# Patient Record
Sex: FEMALE | Race: WHITE | NOT HISPANIC OR LATINO | Employment: OTHER | URBAN - NONMETROPOLITAN AREA
[De-identification: names, ages, dates, MRNs, and addresses within clinical notes are randomized per-mention and may not be internally consistent; named-entity substitution may affect disease eponyms.]

---

## 2021-04-08 ENCOUNTER — APPOINTMENT (EMERGENCY)
Dept: CT IMAGING | Facility: HOSPITAL | Age: 76
End: 2021-04-08
Payer: COMMERCIAL

## 2021-04-08 ENCOUNTER — HOSPITAL ENCOUNTER (EMERGENCY)
Facility: HOSPITAL | Age: 76
Discharge: HOME | End: 2021-04-08
Attending: EMERGENCY MEDICINE | Admitting: EMERGENCY MEDICINE
Payer: COMMERCIAL

## 2021-04-08 VITALS
OXYGEN SATURATION: 97 % | HEIGHT: 65 IN | DIASTOLIC BLOOD PRESSURE: 77 MMHG | RESPIRATION RATE: 21 BRPM | HEART RATE: 90 BPM | TEMPERATURE: 97.9 F | BODY MASS INDEX: 33.39 KG/M2 | WEIGHT: 200.4 LBS | SYSTOLIC BLOOD PRESSURE: 162 MMHG

## 2021-04-08 DIAGNOSIS — R55 NEAR SYNCOPE: Primary | ICD-10-CM

## 2021-04-08 LAB
ALBUMIN SERPL BCP-MCNC: 3.8 G/DL (ref 3.5–5)
ALP SERPL-CCNC: 83 U/L (ref 46–116)
ALT SERPL W P-5'-P-CCNC: 25 U/L (ref 12–78)
ANION GAP SERPL CALCULATED.3IONS-SCNC: 8 MMOL/L (ref 4–13)
APTT PPP: 29 SECONDS (ref 23–37)
AST SERPL W P-5'-P-CCNC: 20 U/L (ref 5–45)
BACTERIA UR QL AUTO: NORMAL /HPF
BASOPHILS # BLD AUTO: 0.09 THOUSANDS/ΜL (ref 0–0.1)
BASOPHILS NFR BLD AUTO: 1 % (ref 0–1)
BILIRUB SERPL-MCNC: 0.31 MG/DL (ref 0.2–1)
BILIRUB UR QL STRIP: NEGATIVE
BUN SERPL-MCNC: 21 MG/DL (ref 5–25)
CALCIUM SERPL-MCNC: 9.6 MG/DL (ref 8.3–10.1)
CHLORIDE SERPL-SCNC: 105 MMOL/L (ref 100–108)
CLARITY UR: CLEAR
CO2 SERPL-SCNC: 29 MMOL/L (ref 21–32)
COLOR UR: YELLOW
CREAT SERPL-MCNC: 0.69 MG/DL (ref 0.6–1.3)
D DIMER PPP FEU-MCNC: 1.04 UG/ML FEU
EOSINOPHIL # BLD AUTO: 0.32 THOUSAND/ΜL (ref 0–0.61)
EOSINOPHIL NFR BLD AUTO: 4 % (ref 0–6)
ERYTHROCYTE [DISTWIDTH] IN BLOOD BY AUTOMATED COUNT: 12.8 % (ref 11.6–15.1)
GFR SERPL CREATININE-BSD FRML MDRD: 85 ML/MIN/1.73SQ M
GLUCOSE SERPL-MCNC: 122 MG/DL (ref 65–140)
GLUCOSE UR STRIP-MCNC: NEGATIVE MG/DL
HCT VFR BLD AUTO: 41.7 % (ref 34.8–46.1)
HGB BLD-MCNC: 14 G/DL (ref 11.5–15.4)
HGB UR QL STRIP.AUTO: ABNORMAL
IMM GRANULOCYTES # BLD AUTO: 0.02 THOUSAND/UL (ref 0–0.2)
IMM GRANULOCYTES NFR BLD AUTO: 0 % (ref 0–2)
INR PPP: 0.97 (ref 0.84–1.19)
KETONES UR STRIP-MCNC: NEGATIVE MG/DL
LACTATE SERPL-SCNC: 1.1 MMOL/L (ref 0.5–2)
LEUKOCYTE ESTERASE UR QL STRIP: NEGATIVE
LIPASE SERPL-CCNC: 95 U/L (ref 73–393)
LYMPHOCYTES # BLD AUTO: 2.45 THOUSANDS/ΜL (ref 0.6–4.47)
LYMPHOCYTES NFR BLD AUTO: 29 % (ref 14–44)
MCH RBC QN AUTO: 32.6 PG (ref 26.8–34.3)
MCHC RBC AUTO-ENTMCNC: 33.6 G/DL (ref 31.4–37.4)
MCV RBC AUTO: 97 FL (ref 82–98)
MONOCYTES # BLD AUTO: 1.04 THOUSAND/ΜL (ref 0.17–1.22)
MONOCYTES NFR BLD AUTO: 12 % (ref 4–12)
NEUTROPHILS # BLD AUTO: 4.65 THOUSANDS/ΜL (ref 1.85–7.62)
NEUTS SEG NFR BLD AUTO: 54 % (ref 43–75)
NITRITE UR QL STRIP: NEGATIVE
NON-SQ EPI CELLS URNS QL MICRO: NORMAL /HPF
NRBC BLD AUTO-RTO: 0 /100 WBCS
PH UR STRIP.AUTO: 6.5 [PH]
PLATELET # BLD AUTO: 291 THOUSANDS/UL (ref 149–390)
PMV BLD AUTO: 9.8 FL (ref 8.9–12.7)
POTASSIUM SERPL-SCNC: 3.6 MMOL/L (ref 3.5–5.3)
PROT SERPL-MCNC: 7.8 G/DL (ref 6.4–8.2)
PROT UR STRIP-MCNC: NEGATIVE MG/DL
PROTHROMBIN TIME: 12.7 SECONDS (ref 11.6–14.5)
RBC # BLD AUTO: 4.29 MILLION/UL (ref 3.81–5.12)
RBC #/AREA URNS AUTO: NORMAL /HPF
SODIUM SERPL-SCNC: 142 MMOL/L (ref 136–145)
SP GR UR STRIP.AUTO: <=1.005 (ref 1–1.03)
TROPONIN I SERPL-MCNC: <0.02 NG/ML
UROBILINOGEN UR QL STRIP.AUTO: 0.2 E.U./DL
WBC # BLD AUTO: 8.57 THOUSAND/UL (ref 4.31–10.16)
WBC #/AREA URNS AUTO: NORMAL /HPF

## 2021-04-08 PROCEDURE — 83605 ASSAY OF LACTIC ACID: CPT | Performed by: EMERGENCY MEDICINE

## 2021-04-08 PROCEDURE — 99285 EMERGENCY DEPT VISIT HI MDM: CPT

## 2021-04-08 PROCEDURE — 85379 FIBRIN DEGRADATION QUANT: CPT | Performed by: EMERGENCY MEDICINE

## 2021-04-08 PROCEDURE — 71275 CT ANGIOGRAPHY CHEST: CPT

## 2021-04-08 PROCEDURE — 80053 COMPREHEN METABOLIC PANEL: CPT | Performed by: EMERGENCY MEDICINE

## 2021-04-08 PROCEDURE — 85730 THROMBOPLASTIN TIME PARTIAL: CPT | Performed by: EMERGENCY MEDICINE

## 2021-04-08 PROCEDURE — 85610 PROTHROMBIN TIME: CPT | Performed by: EMERGENCY MEDICINE

## 2021-04-08 PROCEDURE — 74174 CTA ABD&PLVS W/CONTRAST: CPT

## 2021-04-08 PROCEDURE — 96360 HYDRATION IV INFUSION INIT: CPT

## 2021-04-08 PROCEDURE — 36415 COLL VENOUS BLD VENIPUNCTURE: CPT | Performed by: EMERGENCY MEDICINE

## 2021-04-08 PROCEDURE — G1004 CDSM NDSC: HCPCS

## 2021-04-08 PROCEDURE — 84484 ASSAY OF TROPONIN QUANT: CPT | Performed by: EMERGENCY MEDICINE

## 2021-04-08 PROCEDURE — 83690 ASSAY OF LIPASE: CPT | Performed by: EMERGENCY MEDICINE

## 2021-04-08 PROCEDURE — 99285 EMERGENCY DEPT VISIT HI MDM: CPT | Performed by: EMERGENCY MEDICINE

## 2021-04-08 PROCEDURE — 93005 ELECTROCARDIOGRAM TRACING: CPT

## 2021-04-08 PROCEDURE — 85025 COMPLETE CBC W/AUTO DIFF WBC: CPT | Performed by: EMERGENCY MEDICINE

## 2021-04-08 PROCEDURE — 81001 URINALYSIS AUTO W/SCOPE: CPT | Performed by: EMERGENCY MEDICINE

## 2021-04-08 RX ORDER — ROSUVASTATIN CALCIUM 5 MG/1
5 TABLET, COATED ORAL
COMMUNITY

## 2021-04-08 RX ORDER — LOSARTAN POTASSIUM 25 MG/1
25 TABLET ORAL DAILY
COMMUNITY

## 2021-04-08 RX ADMIN — IOHEXOL 100 ML: 350 INJECTION, SOLUTION INTRAVENOUS at 17:25

## 2021-04-08 RX ADMIN — SODIUM CHLORIDE 1000 ML: 0.9 INJECTION, SOLUTION INTRAVENOUS at 17:25

## 2021-04-08 NOTE — ED PROVIDER NOTES
History  Chief Complaint   Patient presents with    Dizziness     pt woke up at 0500 to go to the bathroom  states she started feeling lightheaded and dizzy and felt her heart racing with " a warm feeling going up her back"; episode lasted ten minutes     Patient states that 5:00 a m  This morning she was going to the bathroom when she suddenly got lightheaded  Had a warm sensation going upper back  Felt her heart beating fast   Symptoms resolved after 10 minutes  Patient had another episode today while cleaning again the symptoms resolved  Again had a warm sensation going upper back  Patient had 1 other episode afterward again while cleaning  Same symptoms  Resolved after 10 minutes  No shortness of breath  No spinning sensation  No change in speech or vision  No focal weakness or numbness  States she has had similar symptoms in the past with her SVT however the warm sensation upper back was new  History provided by:  Patient   used: No    Dizziness  Quality:  Lightheadedness  Severity:  Mild  Onset quality:  Sudden  Duration: 10 minutes  Timing:  Intermittent  Progression:  Resolved  Chronicity:  New  Context: standing up and urinating    Context: not with bowel movement and not with inactivity    Relieved by:  Nothing  Worsened by:  Nothing  Ineffective treatments:  None tried  Associated symptoms: no blood in stool, no chest pain, no diarrhea, no headaches, no hearing loss, no nausea, no palpitations, no shortness of breath, no vision changes and no vomiting        None       Past Medical History:   Diagnosis Date    [facility] [address] [facility]     Hypertension     [facility]        Past Surgical History:   Procedure Laterality Date    BREAST SURGERY       SECTION      NOSE SURGERY      TONSILLECTOMY         History reviewed  No pertinent family history  I have reviewed and agree with the history as documented      E-Cigarette/Vaping  E-Cigarette Use Never User      E-Cigarette/Vaping Substances     Social History     Tobacco Use    Smoking status: Former Smoker    Smokeless tobacco: Never Used   Substance Use Topics    Alcohol use: Not Currently    Drug use: Not Currently       Review of Systems   Constitutional: Negative for chills and fever  HENT: Negative for ear pain, hearing loss, sore throat, trouble swallowing and voice change  Eyes: Negative for pain and discharge  Respiratory: Negative for cough, shortness of breath and wheezing  Cardiovascular: Negative for chest pain and palpitations  Gastrointestinal: Negative for abdominal pain, blood in stool, constipation, diarrhea, nausea and vomiting  Genitourinary: Negative for dysuria, flank pain, frequency and hematuria  Musculoskeletal: Negative for joint swelling, neck pain and neck stiffness  Skin: Negative for rash and wound  Neurological: Positive for dizziness  Negative for seizures, syncope, facial asymmetry and headaches  Psychiatric/Behavioral: Negative for hallucinations, self-injury and suicidal ideas  All other systems reviewed and are negative  Physical Exam  Physical Exam  Vitals signs and nursing note reviewed  Constitutional:       General: She is not in acute distress  Appearance: She is well-developed  HENT:      Head: Normocephalic and atraumatic  Right Ear: External ear normal       Left Ear: External ear normal    Eyes:      Conjunctiva/sclera: Conjunctivae normal       Pupils: Pupils are equal, round, and reactive to light  Neck:      Musculoskeletal: Normal range of motion and neck supple  Cardiovascular:      Rate and Rhythm: Normal rate and regular rhythm  Heart sounds: Normal heart sounds  No murmur  Pulmonary:      Effort: Pulmonary effort is normal       Breath sounds: Normal breath sounds  No wheezing or rales  Abdominal:      General: Bowel sounds are normal  There is no distension        Palpations: Abdomen is soft  Tenderness: There is no abdominal tenderness  There is no guarding or rebound  Musculoskeletal: Normal range of motion  General: No deformity  Skin:     General: Skin is warm and dry  Findings: No rash  Neurological:      General: No focal deficit present  Mental Status: She is alert and oriented to person, place, and time  Cranial Nerves: No cranial nerve deficit  Psychiatric:         Behavior: Behavior normal          Vital Signs  ED Triage Vitals   Temperature Pulse Respirations Blood Pressure SpO2   04/08/21 1635 04/08/21 1630 04/08/21 1635 04/08/21 1630 04/08/21 1630   97 9 °F (36 6 °C) 104 18 (!) 192/85 96 %      Temp Source Heart Rate Source Patient Position - Orthostatic VS BP Location FiO2 (%)   04/08/21 1635 04/08/21 1635 04/08/21 1635 04/08/21 1635 --   Temporal Monitor Sitting Right arm       Pain Score       --                  Vitals:    04/08/21 1630 04/08/21 1635 04/08/21 1645 04/08/21 1730   BP: (!) 192/85 (!) 192/85 153/74 143/65   Pulse: 104 98 99 91   Patient Position - Orthostatic VS:  Sitting Sitting Lying         Visual Acuity      ED Medications  Medications   sodium chloride 0 9 % bolus 1,000 mL (1,000 mL Intravenous New Bag 4/8/21 1725)   iohexol (OMNIPAQUE) 350 MG/ML injection (SINGLE-DOSE) 100 mL (100 mL Intravenous Given 4/8/21 1725)       Diagnostic Studies  Results Reviewed     Procedure Component Value Units Date/Time    Lactic acid [[id number]]  (Normal) Collected: 04/08/21 1639    Lab Status: Final result Specimen: Blood from Arm, Left Updated: 04/08/21 1710     LACTIC ACID 1 1 mmol/L     Narrative:      Result may be elevated if tourniquet was used during collection      Urine Microscopic [[id number]]  (Normal) Collected: 04/08/21 1657    Lab Status: Final result Specimen: Urine, Clean Catch Updated: 04/08/21 1710     RBC, UA 1-2 /hpf      WBC, UA 1-2 /hpf      Epithelial Cells None Seen /hpf      Bacteria, UA None Seen /hpf Troponin I [[id number]]  (Normal) Collected: 04/08/21 1639    Lab Status: Final result Specimen: Blood from Arm, Left Updated: 04/08/21 1708     Troponin I <0 02 ng/mL     D-Dimer [[id number]]  (Abnormal) Collected: 04/08/21 1639    Lab Status: Final result Specimen: Blood from Arm, Left Updated: 04/08/21 1703     D-Dimer, Quant 1 04 ug/ml FEU     Comprehensive metabolic panel [[id number]] Collected: 04/08/21 1639    Lab Status: Final result Specimen: Blood from Arm, Left Updated: 04/08/21 1703     Sodium 142 mmol/L      Potassium 3 6 mmol/L      Chloride 105 mmol/L      CO2 29 mmol/L      ANION GAP 8 mmol/L      BUN 21 mg/dL      Creatinine 0 69 mg/dL      Glucose 122 mg/dL      Calcium 9 6 mg/dL      AST 20 U/L      ALT 25 U/L      Alkaline Phosphatase 83 U/L      Total Protein 7 8 g/dL      Albumin 3 8 g/dL      Total Bilirubin 0 31 mg/dL      eGFR 85 ml/min/1 73sq m     Narrative:      [facility] guidelines for Chronic Kidney Disease (CKD):     Stage 1 with normal or high GFR (GFR > 90 mL/min/1 73 square meters)    Stage 2 Mild CKD (GFR = 60-89 mL/min/1 73 square meters)    Stage 3A Moderate CKD (GFR = 45-59 mL/min/1 73 square meters)    Stage 3B Moderate CKD (GFR = 30-44 mL/min/1 73 square meters)    Stage 4 Severe CKD (GFR = 15-29 mL/min/1 73 square meters)    Stage 5 End Stage CKD (GFR <15 mL/min/1 73 square meters)  Note: GFR calculation is accurate only with a steady state creatinine    Lipase [[id number]]  (Normal) Collected: 04/08/21 1639    Lab Status: Final result Specimen: Blood from Arm, Left Updated: 04/08/21 1703     Lipase 95 u/L     UA w Reflex to Microscopic w Reflex to Culture [[id number]]  (Abnormal) Collected: 04/08/21 1657    Lab Status: Final result Specimen: Urine, Clean Catch Updated: 04/08/21 1703     Color, UA Yellow     Clarity, UA Clear     Specific Gravity, UA <=1 005     pH, UA 6 5     Leukocytes, UA Negative     Nitrite, UA Negative     Protein, UA Negative mg/dl      Glucose, UA Negative mg/dl      Ketones, UA Negative mg/dl      Urobilinogen, UA 0 2 E U /dl      Bilirubin, UA Negative     Blood, UA Trace-Intact    Protime-INR [[id number]]  (Normal) Collected: 04/08/21 1639    Lab Status: Final result Specimen: Blood from Arm, Left Updated: 04/08/21 1700     Protime 12 7 seconds      INR 0 97    APTT [[id number]]  (Normal) Collected: 04/08/21 1639    Lab Status: Final result Specimen: Blood from Arm, Left Updated: 04/08/21 1700     PTT 29 seconds     CBC and differential [[id number]] Collected: 04/08/21 1640    Lab Status: Final result Specimen: Blood from Arm, Left Updated: 04/08/21 1647     WBC 8 57 Thousand/uL      RBC 4 29 Million/uL      Hemoglobin 14 0 g/dL      Hematocrit 41 7 %      MCV 97 fL      MCH 32 6 pg      MCHC 33 6 g/dL      RDW 12 8 %      MPV 9 8 fL      Platelets 407 Thousands/uL      nRBC 0 /100 WBCs      Neutrophils Relative 54 %      Immat GRANS % 0 %      Lymphocytes Relative 29 %      Monocytes Relative 12 %      Eosinophils Relative 4 %      Basophils Relative 1 %      Neutrophils Absolute 4 65 Thousands/µL      Immature Grans Absolute 0 02 Thousand/uL      Lymphocytes Absolute 2 45 Thousands/µL      Monocytes Absolute 1 04 Thousand/µL      Eosinophils Absolute 0 32 Thousand/µL      Basophils Absolute 0 09 Thousands/µL                  CTA dissection protocol chest abdomen pelvis w wo contrast   Final Result by Pawan Alvarado MD (04/08 1754)      No aneurysm or dissection  Diverticulosis with mild inflammatory changes adjacent to the sigmoid colon, correlate for mild diverticulitis  T7 compression favored to be chronic  The study was marked in [facility] for immediate notification           Workstation performed: [id number]                    Procedures  ECG 12 Lead Documentation Only    Date/Time: 4/8/2021 4:38 PM  Performed by: Marty Do MD  Authorized by: Marty Do MD     ECG reviewed by me, the ED Provider: yes Patient location:  ED  Previous ECG:     Previous ECG:  Unavailable  Rate:     ECG rate:  100  Rhythm:     Rhythm: sinus rhythm    Ectopy:     Ectopy: PVCs    QRS:     QRS axis:  Normal             ED Course                                           MDM  Number of Diagnoses or Management Options  Near syncope:   Diagnosis management comments: Discussed with patient and daughter about her lab and CT scan results  CT scan was questionable about diverticulitis  Patient has no abdominal pain  No abdominal tenderness  Has had normal bowel movements  Is afebrile with a normal white blood cell count  Clinically the patient does not have diverticulitis  The patient is to follow up with her family doctor  She is to return if she develops any left-sided abdominal pain or any kind of abdominal pain  Will need antibiotics sent  Patient agrees to plan of care  Amount and/or Complexity of Data Reviewed  Clinical lab tests: reviewed  Review and summarize past medical records: yes        Disposition  Final diagnoses:   Near syncope     Time reflects when diagnosis was documented in both MDM as applicable and the Disposition within this note     Time User Action Codes Description Comment    4/8/2021  4:54 PM Angelic Thomas [[id number]] Near syncope       ED Disposition     ED Disposition Condition Date/Time Comment    Discharge Stable u Apr 8, 2021  5:28 PM Racheal Corcoran discharge to home/self care  Follow-up Information     Follow up With Specialties Details Why Contact Info    Juma Soto MD Family Medicine Call in 1 day  [address] [address]  [phone]            Patient's Medications    No medications on file     No discharge procedures on file      PDMP Review     None          ED Provider  Electronically Signed by           Robyn Brewer MD  04/08/21 1800

## 2021-04-09 LAB
ATRIAL RATE: 101 BPM
P AXIS: 63 DEGREES
PR INTERVAL: 172 MS
QRS AXIS: 10 DEGREES
QRSD INTERVAL: 84 MS
QT INTERVAL: 348 MS
QTC INTERVAL: 451 MS
T WAVE AXIS: 47 DEGREES
VENTRICULAR RATE: 101 BPM

## 2021-04-09 PROCEDURE — 93010 ELECTROCARDIOGRAM REPORT: CPT | Performed by: INTERNAL MEDICINE

## 2022-11-29 ENCOUNTER — TELEPHONE (OUTPATIENT)
Dept: CARDIOLOGY CLINIC | Facility: CLINIC | Age: 77
End: 2022-11-29

## 2022-11-29 ENCOUNTER — TRANSCRIBE ORDERS (OUTPATIENT)
Dept: CARDIOLOGY CLINIC | Facility: CLINIC | Age: 77
End: 2022-11-29

## 2022-11-29 DIAGNOSIS — R31.9 HEMATURIA, UNSPECIFIED TYPE: Primary | ICD-10-CM

## 2022-11-29 NOTE — TELEPHONE ENCOUNTER
New Patient    What is the reason for the patient’s appointment? Patient is being referred by PCP for microscopic hematuria    What office location does the patient prefer? [facility] or [place]    Imaging/Lab Results: UA in care everywhere     Do we accept the patient's insurance or is the patient Self-Pay? Insurance Provider: Yes, [employer]  Plan Type/Number:  Member ID#: Has the patient had any previous Urologist(s)? Over 11 years ago, urologist is      Have patient records been requested? If not are records showing in Epic:     Has the patient had any outside testing done? UA, CMP     Does the patient have a personal history of cancer? No    Patient scheduled 23 at 9 am with Vicinohilary Antoine in [place]

## 2023-01-05 ENCOUNTER — OFFICE VISIT (OUTPATIENT)
Dept: UROLOGY | Facility: CLINIC | Age: 78
End: 2023-01-05

## 2023-01-05 VITALS — BODY MASS INDEX: 31.62 KG/M2 | WEIGHT: 190 LBS

## 2023-01-05 DIAGNOSIS — R31.9 HEMATURIA, UNSPECIFIED TYPE: Primary | ICD-10-CM

## 2023-01-05 LAB
BACTERIA UR QL AUTO: ABNORMAL /HPF
BILIRUB UR QL STRIP: NEGATIVE
CLARITY UR: CLEAR
COLOR UR: ABNORMAL
GLUCOSE UR STRIP-MCNC: NEGATIVE MG/DL
HGB UR QL STRIP.AUTO: NEGATIVE
KETONES UR STRIP-MCNC: NEGATIVE MG/DL
LEUKOCYTE ESTERASE UR QL STRIP: NEGATIVE
MUCOUS THREADS UR QL AUTO: ABNORMAL
NITRITE UR QL STRIP: NEGATIVE
NON-SQ EPI CELLS URNS QL MICRO: ABNORMAL /HPF
PH UR STRIP.AUTO: 5.5 [PH]
PROT UR STRIP-MCNC: NEGATIVE MG/DL
RBC #/AREA URNS AUTO: ABNORMAL /HPF
SP GR UR STRIP.AUTO: 1.02 (ref 1–1.03)
UROBILINOGEN UR STRIP-ACNC: <2 MG/DL
WBC #/AREA URNS AUTO: ABNORMAL /HPF

## 2023-01-05 NOTE — PROGRESS NOTES
1/5/2023    No chief complaint on file  Assessment and Plan    68 y o  female new patient to office    1  Microscopic hematuria  · Reports prior history of and underwent prior cystoscopy over 10 years ago by Dr Dileep Dubois  · Urine dip today is positive for trace blood  · Send for UA/micro  · She is a former smoker as well as second hand smoke exposure  · Denies family history of bladder, ureteral, or renal cancer  · Pending results of microscopic urinalysis we will consider possible need for CT Renal Protocol and cystoscopy  I would only recommend this if urine micro is positive for true microscopic hematuria  CT imaging from 4/2021 did show on obstructing punctate left renal calculi  History of Present Illness  Shandra Nielsen is a 68 y o  female new patient to office  Here for evaluation of microscopic hematuria  She reports a history of microscopic hematuria in the past and was previously seen by Dr Dileep Dubois over 10 years ago and under cystoscopy around that time which she reports was normal  She presents today for evaluation after routine lab and urine testing was completed by her PCP Dr Angie Sutton  She is a former smoker having quit in [facility] but was a  and was around second hand smoke for many years  Denies any family history of bladder, ureteral, or renal cancer  Review of prior CT imaging from 4/08/2021 showed a punctate non obstructing left renal calcification  She denies any known history of kidney stones  Negative chemical exposure history  Review of Systems   Constitutional: Negative for chills and fever  HENT: Negative for ear pain and sore throat  Eyes: Negative for pain and visual disturbance  Respiratory: Negative for cough and shortness of breath  Cardiovascular: Negative for chest pain and palpitations  Gastrointestinal: Negative for abdominal pain, constipation, diarrhea, nausea and vomiting     Genitourinary: Negative for difficulty urinating, dysuria, flank pain, frequency, hematuria, pelvic pain and urgency  Musculoskeletal: Negative for arthralgias and back pain  Skin: Negative for color change and rash  Neurological: Positive for numbness  Negative for dizziness, syncope and weakness  All other systems reviewed and are negative  Vitals  Vitals:    01/05/23 0849   Weight: 86 2 kg (190 lb)       Physical Exam  Vitals reviewed  Constitutional:       General: She is not in acute distress  Appearance: Normal appearance  She is normal weight  She is not ill-appearing or toxic-appearing  HENT:      Head: Normocephalic and atraumatic  Nose: Nose normal    Eyes:      General: No scleral icterus  Conjunctiva/sclera: Conjunctivae normal    Cardiovascular:      Rate and Rhythm: Normal rate  Pulses: Normal pulses  Pulmonary:      Effort: Pulmonary effort is normal  No respiratory distress  Abdominal:      General: Abdomen is flat  Palpations: Abdomen is soft  Tenderness: There is no abdominal tenderness  There is no right CVA tenderness or left CVA tenderness  Hernia: No hernia is present  Musculoskeletal:         General: Normal range of motion  Cervical back: Normal range of motion  Skin:     General: Skin is warm and dry  Neurological:      General: No focal deficit present  Mental Status: She is alert and oriented to person, place, and time  Mental status is at baseline  Psychiatric:         Mood and Affect: Mood normal          Behavior: Behavior normal          Thought Content: Thought content normal          Judgment: Judgment normal          Past History  Past Medical History:   Diagnosis Date   • Diabetes mellitus ([facility] [address] )    • Hypertension    • SV[facility]ra[facility] tachycardia) ([facility])      Social History     Socioeconomic History   • Marital status:       Spouse name: None   • Number of children: None   • Years of education: None   • Highest education level: None   Occupational History   • None   Tobacco Use   • Smoking status: Former   • Smokeless tobacco: Never   Vaping Use   • Vaping Use: Never used   Substance and Sexual Activity   • Alcohol use: Not Currently   • Drug use: Not Currently   • Sexual activity: None   Other Topics Concern   • None   Social History Narrative   • None     Social Determinants of Health     Financial Resource Strain: Not on file   Food Insecurity: Not on file   Transportation Needs: Not on file   Physical Activity: Not on file   Stress: Not on file   Social Connections: Not on file   Intimate Partner Violence: Not on file   Housing Stability: Not on file     Social History     Tobacco Use   Smoking Status Former   Smokeless Tobacco Never     History reviewed  No pertinent family history  The following portions of the patient's history were reviewed and updated as appropriate allergies, current medications, past medical history, past social history, past surgical history and problem list    [email or web address]  CT ANGIOGRAM OF THE CHEST, ABDOMEN AND PELVIS WITH AND WITHOUT IV CONTRAST     INDICATION:  pt woke up at 0500 to go to the bathroom  States she started feeling lightheaded and dizzy and felt her heart racing with " a warm feeling going up her back"; episode lasted ten minutes     COMPARISON: None      TECHNIQUE:  CT angiogram examination of the chest, abdomen and pelvis was performed according to standard protocol  This examination, like all CT scans performed in the [facility], was performed utilizing techniques to minimize   radiation dose exposure, including the use of iterative reconstruction and automated exposure control  Contrast as well as noncontrast images were obtained  3D reconstructions were performed an independent workstation, and are supplied for review      Rad dose 1777 mGy-cm      IV Contrast:  100 mL of iohexol (OMNIPAQUE)  Enteric Contrast:     FINDINGS:     VASCULAR STRUCTURES:  Mild atherosclerotic calcifications  No aortic aneurysm or dissection  Brachiocephalic, subclavian and proximal common carotid arteries are widely patent  The celiac, superior mesenteric and inferior mesenteric arteries are widely   patent  Single left, main right and 2 small accessory right renal arteries are patent  No significant stenosis of the iliac or proximal femoral arteries      No acute pulmonary embolism      OTHER FINDINGS:      CHEST:      HEART:  Coronary artery calcifications  Normal cardiac size  No pericardial effusion      LUNGS: No suspicious lung nodule, mass or consolidation  Mild emphysematous changes  Mild basilar subsegmental atelectasis or scarring  Small mucous impacted bronchi in the lower lungs  Central bronchi are patent      PLEURA: No pleural effusion      MEDIASTINUM AND CHRISTINE:  No mass or significant lymphadenopathy      CHEST WALL AND LOWER NECK:  4 x 3 2 cm nodule in the right lobe of the thyroid gland  Incidental discovery of one or more thyroid nodule(s) measuring more than 1 5 cm and without suspicious features is noted in this patient who is above 28years old;   according to guidelines published in the February 2015 white paper on incidental thyroid nodules in the Journal of the [employer] of Radiology Sheri Sanchez), further characterization with thyroid ultrasound is recommended        ABDOMEN     LIVER/BILIARY TREE:  Coarse calcification at the dome of the liver  Subcentimeter hypodense lesion in the lateral segment of the liver is too small to characterize but statistically benign  No other focal liver lesion  No biliary dilatation      GALLBLADDER:  No calcified gallstones  No pericholecystic inflammatory change      SPLEEN:  Unremarkable  Normal size      PANCREAS:  Unremarkable      ADRENAL GLANDS:  Unremarkable      KIDNEYS/URETERS:  No solid renal mass  No hydronephrosis  Punctate nonobstructing left renal calcifications      PELVIS     REPRODUCTIVE ORGANS:  Unremarkable for patient's age  Tubal ligation clips are noted      URINARY BLADDER:  Unremarkable      ADDITIONAL ABDOMINAL AND PELVIC STRUCTURES:      STOMACH AND BOWEL:  There is diverticulosis  There are mild inflammatory changes adjacent to the sigmoid colon , correlate for mild diverticulitis  No obstruction      ABDOMINOPELVIC CAVITY:  No pathologically enlarged mesenteric or retroperitoneal lymph nodes  No ascites or free intraperitoneal air      ABDOMINAL WALL/INGUINAL REGIONS:  Small fat-containing umbilical hernia      OSSEOUS STRUCTURES:  T7 compression with moderate loss of height and no bony retropulsion  Chronicity is favored  Degenerative grade 1 anterolisthesis at L4-5      IMPRESSION:     No aneurysm or dissection  Diverticulosis with mild inflammatory changes adjacent to the sigmoid colon, correlate for mild diverticulitis  T7 compression favored to be chronic  Results  No results found for this or any previous visit (from the past 1 hour(s))  ]  No results found for: PSA  Lab Results   Component Value Date    CALCIUM 9 6 04/08/2021    K 3 6 04/08/2021    CO2 29 04/08/2021     04/08/2021    BUN 21 04/08/2021    CREATININE 0 69 04/08/2021     Lab Results   Component Value Date    WBC 8 57 04/08/2021    HGB 14 0 04/08/2021    HCT 41 7 04/08/2021    MCV 97 04/08/2021     04/08/2021       Please Note:  Voice dictation software has been used to create this document  There may be inadvertent transcriptions errors       Henri Swann

## 2023-01-06 ENCOUNTER — TELEPHONE (OUTPATIENT)
Dept: UROLOGY | Facility: CLINIC | Age: 78
End: 2023-01-06

## 2023-01-06 DIAGNOSIS — R31.9 HEMATURIA, UNSPECIFIED TYPE: Primary | ICD-10-CM

## 2023-01-06 NOTE — TELEPHONE ENCOUNTER
----- Message from Nicole U  79  sent at 1/6/2023  7:37 AM EST -----  Please let patient know that her urine microscopic was positive for true microscopic hematuria  I am recommending a CT Renal Protocol scan as well as a cystoscopy for further evaluation  This was discussed with her yesterday during her office visit

## 2023-01-06 NOTE — PROGRESS NOTES
Error was made when reviewing results of patients urine microscopic analysis from yesterday  Patient was seen yesterday for evaluation of microscopic hematuria yesterday  Upon review of urine results this morning I had documented that patients urine micro was positive for true microscopic hematuria with 2-4 RBC  This however this is an error as upon further review patients urine microscopic shows 1-2 RBC per hpf and 2-4 WBC per hpf  Base on these results, patient does not meet criteria for true microscopic hematuria and CT imaging and cystoscopy are no indicated at this time

## 2023-01-06 NOTE — RESULT ENCOUNTER NOTE
Please let patient know that her urine microscopic was positive for true microscopic hematuria  I am recommending a CT Renal Protocol scan as well as a cystoscopy for further evaluation  This was discussed with her yesterday during her office visit

## 2023-01-06 NOTE — TELEPHONE ENCOUNTER
Error noted upon initial review of patients recent urine microscopic analysis from yesterday  I had documented that patient was found to have true microscopic hematuria with RBC of 2-4 per hpf  However, this is incorrect  Patients urine microscopic shows 1-2 RBC and 2-4 WBC  Based on these results, there is no indication for CT renal protocol at this time

## 2023-01-08 ENCOUNTER — APPOINTMENT (EMERGENCY)
Dept: RADIOLOGY | Facility: HOSPITAL | Age: 78
End: 2023-01-08

## 2023-01-08 ENCOUNTER — HOSPITAL ENCOUNTER (EMERGENCY)
Facility: HOSPITAL | Age: 78
Discharge: HOME | End: 2023-01-08
Attending: EMERGENCY MEDICINE

## 2023-01-08 VITALS
TEMPERATURE: 98.4 F | WEIGHT: 199.74 LBS | BODY MASS INDEX: 33.24 KG/M2 | SYSTOLIC BLOOD PRESSURE: 115 MMHG | DIASTOLIC BLOOD PRESSURE: 54 MMHG | RESPIRATION RATE: 17 BRPM | OXYGEN SATURATION: 94 % | HEART RATE: 78 BPM

## 2023-01-08 DIAGNOSIS — I47.1 SVT (SUPRAVENTRICULAR TACHYCARDIA) (HCC): ICD-10-CM

## 2023-01-08 DIAGNOSIS — U07.1 COVID-19 VIRUS INFECTION: Primary | ICD-10-CM

## 2023-01-08 LAB
2HR DELTA HS TROPONIN: 3 NG/L
ALBUMIN SERPL BCP-MCNC: 3.9 G/DL (ref 3.5–5)
ALP SERPL-CCNC: 90 U/L (ref 46–116)
ALT SERPL W P-5'-P-CCNC: 18 U/L (ref 12–78)
ANION GAP SERPL CALCULATED.3IONS-SCNC: 7 MMOL/L (ref 4–13)
APTT PPP: 25 SECONDS (ref 23–37)
AST SERPL W P-5'-P-CCNC: 21 U/L (ref 5–45)
ATRIAL RATE: 117 BPM
BASOPHILS # BLD AUTO: 0.08 THOUSANDS/ÂΜL (ref 0–0.1)
BASOPHILS NFR BLD AUTO: 1 % (ref 0–1)
BILIRUB SERPL-MCNC: 0.25 MG/DL (ref 0.2–1)
BUN SERPL-MCNC: 20 MG/DL (ref 5–25)
CALCIUM SERPL-MCNC: 9.8 MG/DL (ref 8.3–10.1)
CARDIAC TROPONIN I PNL SERPL HS: 4 NG/L
CARDIAC TROPONIN I PNL SERPL HS: 7 NG/L
CHLORIDE SERPL-SCNC: 102 MMOL/L (ref 96–108)
CO2 SERPL-SCNC: 28 MMOL/L (ref 21–32)
CREAT SERPL-MCNC: 0.73 MG/DL (ref 0.6–1.3)
EOSINOPHIL # BLD AUTO: 0.33 THOUSAND/ÂΜL (ref 0–0.61)
EOSINOPHIL NFR BLD AUTO: 4 % (ref 0–6)
ERYTHROCYTE [DISTWIDTH] IN BLOOD BY AUTOMATED COUNT: 12.2 % (ref 11.6–15.1)
GFR SERPL CREATININE-BSD FRML MDRD: 79 ML/MIN/1.73SQ M
GLUCOSE SERPL-MCNC: 152 MG/DL (ref 65–140)
HCT VFR BLD AUTO: 46.5 % (ref 34.8–46.1)
HGB BLD-MCNC: 15.3 G/DL (ref 11.5–15.4)
IMM GRANULOCYTES # BLD AUTO: 0.02 THOUSAND/UL (ref 0–0.2)
IMM GRANULOCYTES NFR BLD AUTO: 0 % (ref 0–2)
INR PPP: 0.88 (ref 0.84–1.19)
LYMPHOCYTES # BLD AUTO: 2.31 THOUSANDS/ÂΜL (ref 0.6–4.47)
LYMPHOCYTES NFR BLD AUTO: 30 % (ref 14–44)
MAGNESIUM SERPL-MCNC: 2.1 MG/DL (ref 1.6–2.6)
MCH RBC QN AUTO: 32.4 PG (ref 26.8–34.3)
MCHC RBC AUTO-ENTMCNC: 32.9 G/DL (ref 31.4–37.4)
MCV RBC AUTO: 99 FL (ref 82–98)
MONOCYTES # BLD AUTO: 1.29 THOUSAND/ÂΜL (ref 0.17–1.22)
MONOCYTES NFR BLD AUTO: 17 % (ref 4–12)
NEUTROPHILS # BLD AUTO: 3.69 THOUSANDS/ÂΜL (ref 1.85–7.62)
NEUTS SEG NFR BLD AUTO: 48 % (ref 43–75)
NRBC BLD AUTO-RTO: 0 /100 WBCS
NT-PROBNP SERPL-MCNC: 170 PG/ML
P AXIS: 63 DEGREES
PLATELET # BLD AUTO: 317 THOUSANDS/UL (ref 149–390)
PMV BLD AUTO: 9.4 FL (ref 8.9–12.7)
POTASSIUM SERPL-SCNC: 3.9 MMOL/L (ref 3.5–5.3)
PR INTERVAL: 176 MS
PROT SERPL-MCNC: 7.8 G/DL (ref 6.4–8.4)
PROTHROMBIN TIME: 12 SECONDS (ref 11.6–14.5)
QRS AXIS: -22 DEGREES
QRS AXIS: 0 DEGREES
QRS AXIS: 17 DEGREES
QRSD INTERVAL: 82 MS
QRSD INTERVAL: 82 MS
QRSD INTERVAL: 84 MS
QT INTERVAL: 192 MS
QT INTERVAL: 282 MS
QT INTERVAL: 330 MS
QTC INTERVAL: 365 MS
QTC INTERVAL: 424 MS
QTC INTERVAL: 460 MS
RBC # BLD AUTO: 4.72 MILLION/UL (ref 3.81–5.12)
SODIUM SERPL-SCNC: 137 MMOL/L (ref 135–147)
T WAVE AXIS: -10 DEGREES
T WAVE AXIS: 233 DEGREES
T WAVE AXIS: 69 DEGREES
TSH SERPL DL<=0.05 MIU/L-ACNC: 1.51 UIU/ML (ref 0.45–4.5)
VENTRICULAR RATE: 117 BPM
VENTRICULAR RATE: 136 BPM
VENTRICULAR RATE: 217 BPM
WBC # BLD AUTO: 7.72 THOUSAND/UL (ref 4.31–10.16)

## 2023-01-08 RX ORDER — ADENOSINE 3 MG/ML
INJECTION, SOLUTION INTRAVENOUS
Status: COMPLETED
Start: 2023-01-08 | End: 2023-01-08

## 2023-01-08 RX ORDER — MAGNESIUM SULFATE 1 G/100ML
1 INJECTION INTRAVENOUS ONCE
Status: COMPLETED | OUTPATIENT
Start: 2023-01-08 | End: 2023-01-08

## 2023-01-08 RX ORDER — POTASSIUM CHLORIDE 20 MEQ/1
20 TABLET, EXTENDED RELEASE ORAL ONCE
Status: COMPLETED | OUTPATIENT
Start: 2023-01-08 | End: 2023-01-08

## 2023-01-08 RX ORDER — ADENOSINE 3 MG/ML
6 INJECTION INTRAVENOUS ONCE
Status: COMPLETED | OUTPATIENT
Start: 2023-01-08 | End: 2023-01-08

## 2023-01-08 RX ORDER — ACETAMINOPHEN 325 MG/1
975 TABLET ORAL ONCE
Status: COMPLETED | OUTPATIENT
Start: 2023-01-08 | End: 2023-01-08

## 2023-01-08 RX ADMIN — METOPROLOL TARTRATE 25 MG: 25 TABLET, FILM COATED ORAL at 02:06

## 2023-01-08 RX ADMIN — POTASSIUM CHLORIDE 20 MEQ: 1500 TABLET, EXTENDED RELEASE ORAL at 02:06

## 2023-01-08 RX ADMIN — ACETAMINOPHEN 975 MG: 325 TABLET ORAL at 00:24

## 2023-01-08 RX ADMIN — MAGNESIUM SULFATE HEPTAHYDRATE 1 G: 1 INJECTION, SOLUTION INTRAVENOUS at 02:06

## 2023-01-08 RX ADMIN — ADENOSINE 6 MG: 3 INJECTION INTRAVENOUS at 00:54

## 2023-01-08 NOTE — ED PROVIDER NOTES
History  Chief Complaint   Patient presents with   • Palpitations     Had an episode of palpitation with dizziness  Denies sob or cp  Palpitations gone at this time  Test positive for covid 2 days ago     Patient is a 15-year-old female with history of diabetes hypertension and SVT presents the emergency department due to palpitations and feeling lightheaded symptoms started this evening patient recently diagnosed COVID-positive 2 days ago  No cough or shortness of breath no chest pain  Symptoms now subsiding since evaluation by EMS patient reports she took her heart rate and blood pressure with a home blood pressure cuff and found her heart rate and blood pressure to be elevated  History provided by:  Patient and EMS personnel  Palpitations  Palpitations quality:  Fast  Onset quality:  Sudden  Duration:  1 hour  Timing:  Intermittent  Progression:  Resolved  Chronicity:  New  Associated symptoms: no chest pain, no cough, no dizziness, no nausea, no numbness, no shortness of breath, no vomiting and no weakness        Prior to Admission Medications   Prescriptions Last Dose Informant Patient Reported? Taking?    Cholecalciferol 125 MCG (5000 UT) capsule   Yes No   Sig: Take 1 tablet by mouth daily   Melatonin 5 MG CAPS   Yes No   Sig: Take by mouth   Multiple Vitamins-Minerals (CENTRUM SILVER PO)   Yes No   Sig: Take by mouth   losartan (COZAAR) 25 mg tablet   Yes No   Sig: Take 25 mg by mouth daily   metFORMIN (GLUCOPHAGE) 500 mg tablet   Yes No   Sig: Take 500 mg by mouth daily   metoprolol tartrate (LOPRESSOR) 25 mg tablet   Yes No   Sig: Take 25 mg by mouth 2 (two) times a day   rosuvastatin (CRESTOR) 5 mg tablet   Yes No   Sig: Take 5 mg by mouth      Facility-Administered Medications: None       Past Medical History:   Diagnosis Date   • Diabetes mellitus (HCC)    • Hypertension    • [facility]        Past Surgical History:   Procedure Laterality Date   • BREAST SURGERY •  SECTION     • NOSE SURGERY     • TONSILLECTOMY         History reviewed  No pertinent family history  I have reviewed and agree with the history as documented  E-Cigarette/Vaping   • E-Cigarette Use Never User      E-Cigarette/Vaping Substances     Social History     Tobacco Use   • Smoking status: Former   • Smokeless tobacco: Never   Vaping Use   • Vaping Use: Never used   Substance Use Topics   • Alcohol use: Not Currently   • Drug use: Not Currently       Review of Systems   Constitutional: Negative for activity change, appetite change, chills, fatigue and fever  HENT: Negative for congestion, ear pain, rhinorrhea and sore throat  Eyes: Negative for discharge, redness and visual disturbance  Respiratory: Negative for cough, chest tightness, shortness of breath and wheezing  Cardiovascular: Positive for palpitations  Negative for chest pain  Gastrointestinal: Negative for abdominal pain, constipation, diarrhea, nausea and vomiting  Endocrine: Negative for polydipsia and polyuria  Genitourinary: Negative for difficulty urinating, dysuria, frequency, hematuria and urgency  Musculoskeletal: Negative for arthralgias and myalgias  Skin: Negative for color change, pallor and rash  Neurological: Positive for light-headedness  Negative for dizziness, weakness, numbness and headaches  Hematological: Negative for adenopathy  Does not bruise/bleed easily  Psychiatric/Behavioral: The patient is nervous/anxious  All other systems reviewed and are negative  Physical Exam  Physical Exam  Vitals and nursing note reviewed  Constitutional:       Appearance: She is well-developed  HENT:      Head: Normocephalic and atraumatic  Right Ear: External ear normal       Left Ear: External ear normal       Nose: Nose normal    Eyes:      Conjunctiva/sclera: Conjunctivae normal       Pupils: Pupils are equal, round, and reactive to light     Cardiovascular:      Rate and Rhythm: Regular rhythm  Tachycardia present  Heart sounds: Normal heart sounds  Pulmonary:      Effort: Pulmonary effort is normal  No respiratory distress  Breath sounds: Normal breath sounds  No wheezing or rales  Chest:      Chest wall: No tenderness  Abdominal:      General: Bowel sounds are normal  There is no distension  Palpations: Abdomen is soft  Tenderness: There is no abdominal tenderness  There is no guarding  Musculoskeletal:         General: Normal range of motion  Cervical back: Normal range of motion and neck supple  Skin:     General: Skin is warm and dry  Neurological:      Mental Status: She is alert and oriented to person, place, and time  Cranial Nerves: No cranial nerve deficit  Sensory: No sensory deficit           Vital Signs  ED Triage Vitals [01/08/23 0020]   Temperature Pulse Respirations Blood Pressure SpO2   98 4 °F (36 9 °C) 103 18 132/67 95 %      Temp Source Heart Rate Source Patient Position - Orthostatic VS BP Location FiO2 (%)   Oral Monitor -- Right arm --      Pain Score       No Pain           Vitals:    01/08/23 0112 01/08/23 0130 01/08/23 0215 01/08/23 0246   BP: 149/82 147/70 128/66 108/64   Pulse: 97 92 (!) 107 92         Visual Acuity      ED Medications  Medications   acetaminophen (TYLENOL) tablet 975 mg (975 mg Oral Given 1/8/23 0024)   adenosine (ADENOCARD) injection 6 mg (6 mg Intravenous Given 1/8/23 0054)   metoprolol tartrate (LOPRESSOR) tablet 25 mg (25 mg Oral Given 1/8/23 0206)   potassium chloride (K-DUR,KLOR-CON) CR tablet 20 mEq (20 mEq Oral Given 1/8/23 0206)   magnesium sulfate IVPB (premix) SOLN 1 g (1 g Intravenous New Bag 1/8/23 0206)       Diagnostic Studies  Results Reviewed     Procedure Component Value Units Date/Time    HS Troponin I 2hr [[id number]]  (Normal) Collected: 01/08/23 0211    Lab Status: Final result Specimen: Blood from Arm, Right Updated: 01/08/23 0253     hs TnI 2hr 7 ng/L      Delta 2hr hsTnI 3 ng/L     TSH [[id number]]  (Normal) Collected: 01/08/23 0031    Lab Status: Final result Specimen: Blood from Arm, Right Updated: 01/08/23 0233     TSH 3RD GENERATON 1 508 uIU/mL     Narrative:      Patients undergoing fluorescein dye angiography may retain small amounts of fluorescein in the body for 48-72 hours post procedure  Samples containing fluorescein can produce falsely depressed TSH values  If the patient had this procedure,a specimen should be resubmitted post fluorescein clearance        HS Troponin I 4hr [[id number]]     Lab Status: No result Specimen: Blood     HS Troponin 0hr (reflex protocol) [[id number]]  (Normal) Collected: 01/08/23 0031    Lab Status: Final result Specimen: Blood from Arm, Right Updated: 01/08/23 0104     hs TnI 0hr 4 ng/L     NT-BNP PRO [[id number]]  (Normal) Collected: 01/08/23 0031    Lab Status: Final result Specimen: Blood from Arm, Right Updated: 01/08/23 0103     NT-proBNP 170 pg/mL     Comprehensive metabolic panel [[id number]]  (Abnormal) Collected: 01/08/23 0031    Lab Status: Final result Specimen: Blood from Arm, Right Updated: 01/08/23 0103     Sodium 137 mmol/L      Potassium 3 9 mmol/L      Chloride 102 mmol/L      CO2 28 mmol/L      ANION GAP 7 mmol/L      BUN 20 mg/dL      Creatinine 0 73 mg/dL      Glucose 152 mg/dL      Calcium 9 8 mg/dL      AST 21 U/L      ALT 18 U/L      Alkaline Phosphatase 90 U/L      Total Protein 7 8 g/dL      Albumin 3 9 g/dL      Total Bilirubin 0 25 mg/dL      eGFR 79 ml/min/1 73sq m     Narrative:      J Luis guidelines for Chronic Kidney Disease (CKD):   •  Stage 1 with normal or high GFR (GFR > 90 mL/min/1 73 square meters)  •  Stage 2 Mild CKD (GFR = 60-89 mL/min/1 73 square meters)  •  Stage 3A Moderate CKD (GFR = 45-59 mL/min/1 73 square meters)  •  Stage 3B Moderate CKD (GFR = 30-44 mL/min/1 73 square meters)  •  Stage 4 Severe CKD (GFR = 15-29 mL/min/1 73 square meters)  •  Stage 5 End Stage CKD (GFR <15 mL/min/1 73 square meters)  Note: GFR calculation is accurate only with a steady state creatinine    Magnesium [[id number]]  (Normal) Collected: 01/08/23 0031    Lab Status: Final result Specimen: Blood from Arm, Right Updated: 01/08/23 0103     Magnesium 2 1 mg/dL     Protime-INR [[id number]]  (Normal) Collected: 01/08/23 0031    Lab Status: Final result Specimen: Blood from Arm, Right Updated: 01/08/23 0052     Protime 12 0 seconds      INR 0 88    APTT [[id number]]  (Normal) Collected: 01/08/23 0031    Lab Status: Final result Specimen: Blood from Arm, Right Updated: 01/08/23 0052     PTT 25 seconds     CBC and differential [[id number]]  (Abnormal) Collected: 01/08/23 0031    Lab Status: Final result Specimen: Blood from Arm, Right Updated: 01/08/23 0040     WBC 7 72 Thousand/uL      RBC 4 72 Million/uL      Hemoglobin 15 3 g/dL      Hematocrit 46 5 %      MCV 99 fL      MCH 32 4 pg      MCHC 32 9 g/dL      RDW 12 2 %      MPV 9 4 fL      Platelets 798 Thousands/uL      nRBC 0 /100 WBCs      Neutrophils Relative 48 %      Immat GRANS % 0 %      Lymphocytes Relative 30 %      Monocytes Relative 17 %      Eosinophils Relative 4 %      Basophils Relative 1 %      Neutrophils Absolute 3 69 Thousands/µL      Immature Grans Absolute 0 02 Thousand/uL      Lymphocytes Absolute 2 31 Thousands/µL      Monocytes Absolute 1 29 Thousand/µL      Eosinophils Absolute 0 33 Thousand/µL      Basophils Absolute 0 08 Thousands/µL                  XR chest 1 view portable   ED Interpretation by Luan Banks DO (01/08 0130)   No acute cardiopulmonary disease                 Procedures  ECG 12 Lead Documentation Only    Date/Time: 1/8/2023 12:33 AM  Performed by: Luan Banks DO  Authorized by:  Luan Banks DO     ECG reviewed by me, the ED Provider: yes    Patient location:  ED  Previous ECG:     Comparison to cardiac monitor: Yes    Quality:     Tracing quality:  Limited by artifact  Rate:     ECG rate:  117    ECG rate assessment: tachycardic    Rhythm:     Rhythm: sinus tachycardia    QRS:     QRS axis:  Left    QRS intervals:  Normal  Conduction:     Conduction: normal    ST segments:     ST segments:  Non-specific  T waves:     T waves: non-specific               ED Course  ED Course as of 01/08/23 0338   Chin Sample Jan 08, 2023   0053 Patient having episode of SVT in the ED now we will treat with adenosine  6066 Patient had SVT in the ED and was cardioverted with adenosine now in normal sinus rhythm  And feeling well again I suspect the symptoms at home as patient reports they were similar reflected her being in SVT which resolved spontaneously  Patient does have history of SVT  200 Spoke with Dr Andre Wallis cardiology on-call reviewed case and findings in the emergency department she recommends treating with metoprolol and magnesium and potassium in the ED to optimize electrolytes and prevent further SVT reports that there have been frequent recurrences of SVT occurring in the setting of COVID and feels that this is secondary to this  Felt if remains stable in sinus rhythm now can be discharged home with outpatient follow-up  SBIRT 20yo+    Flowsheet Row Most Recent Value   SBIRT (23 yo +)    In order to provide better care to our patients, we are screening all of our patients for alcohol and drug use  Would it be okay to ask you these screening questions? Yes Filed at: 01/08/2023 0101   Initial Alcohol Screen: US AUDIT-C     1  How often do you have a drink containing alcohol? 0 Filed at: 01/08/2023 0101   2  How many drinks containing alcohol do you have on a typical day you are drinking? 0 Filed at: 01/08/2023 0101   3a  Male UNDER 65: How often do you have five or more drinks on one occasion? 0 Filed at: 01/08/2023 0101   3b  FEMALE Any Age, or MALE 65+: How often do you have 4 or more drinks on one occassion?  0 Filed at: 01/08/2023 0101   Audit-C Score 0 Filed at: 01/08/2023 0101 CAROL: How many times in the past year have you    Used an illegal drug or used a prescription medication for non-medical reasons? Never Filed at: 01/08/2023 0101                    Medical Decision Making  Patient remained clinically hemodynamically stable in the emergency department following treatment with adenosine for SVT in the ED she remained in normal sinus rhythm felt well remained stable this was discussed with cardiology recommendation was for increasing metoprolol to 50 twice daily patient in agreement with this advise prompt follow-up with cardiology for further evaluation and treatment return precautions and anticipatory guidance discussed  COVID-19 virus infection: acute illness or injury  SV[facility]ra[facility]ricular tachy[facility]) ([facility] ): acute illness or injury  Amount and/or Complexity of Data Reviewed  Labs: ordered  Decision-making details documented in ED Course  Radiology: ordered and independent interpretation performed  Decision-making details documented in ED Course  ECG/medicine tests: ordered and independent interpretation performed  Decision-making details documented in ED Course  Risk  OTC drugs  Prescription drug management  Disposition  Final diagnoses:   COVID-19 virus infection   SVT (supraventricular tachycardia) ([facility] ) - recurrent     Time reflects when diagnosis was documented in both MDM as applicable and the Disposition within this note     Time User Action Codes Description Comment    1/8/2023  3:18 AM Mehnaz Priyudith Maxcy Add [U07 1] COVID-19 virus infection     1/8/2023  3:18 AM Remnikhil Priscella Maxcy Add [I47 1] SVT (supraventricular tachycardia) ([facility]     1/8/2023  3:19 AM Leslie Mcmahon Modify [I47 1] SVT (supraventricular tachycardia) [facility]) recurrent      ED Disposition     ED Disposition   Discharge    Condition   Stable    Date/Time   Sun Jan 8, 2023  3:18 AM    Yasmin Currie discharge to home/self care                 Follow-up Information     Follow up With Specialties Details Why Contact Info    Quinn Don MD Family Medicine Schedule an appointment as soon as possible for a visit in 2 days  [address] [facility]      Mell Ren DO Cardiology Schedule an appointment as soon as possible for a visit in 3 days  [place] [address]  [facility] [address]  [phone]            Patient's Medications   Discharge Prescriptions    No medications on file           PDMP Review     None          ED Provider  Electronically Signed by           Za Gamble DO  01/08/23 2741

## 2023-02-28 ENCOUNTER — HOSPITAL ENCOUNTER (INPATIENT)
Facility: HOSPITAL | Age: 78
LOS: 2 days | End: 2023-03-02
Attending: EMERGENCY MEDICINE | Admitting: FAMILY MEDICINE

## 2023-02-28 ENCOUNTER — APPOINTMENT (EMERGENCY)
Dept: RADIOLOGY | Facility: HOSPITAL | Age: 78
End: 2023-02-28

## 2023-02-28 ENCOUNTER — APPOINTMENT (INPATIENT)
Dept: CT IMAGING | Facility: HOSPITAL | Age: 78
End: 2023-02-28

## 2023-02-28 DIAGNOSIS — R00.2 PALPITATIONS: ICD-10-CM

## 2023-02-28 DIAGNOSIS — R09.89 PULMONARY VASCULAR CONGESTION: ICD-10-CM

## 2023-02-28 DIAGNOSIS — I47.1 SVT (SUPRAVENTRICULAR TACHYCARDIA) (HCC): Primary | ICD-10-CM

## 2023-02-28 DIAGNOSIS — I48.92 ATRIAL FLUTTER (HCC): ICD-10-CM

## 2023-02-28 PROBLEM — J18.9 PNEUMONIA: Status: ACTIVE | Noted: 2023-02-28

## 2023-02-28 PROBLEM — E66.811 CLASS 1 OBESITY DUE TO EXCESS CALORIES WITH SERIOUS COMORBIDITY AND BODY MASS INDEX (BMI) OF 32.0 TO 32.9 IN ADULT: Status: ACTIVE | Noted: 2023-02-28

## 2023-02-28 PROBLEM — I47.10 SVT (SUPRAVENTRICULAR TACHYCARDIA): Status: ACTIVE | Noted: 2023-02-28

## 2023-02-28 PROBLEM — G47.30 SLEEP APNEA: Status: ACTIVE | Noted: 2023-02-28

## 2023-02-28 PROBLEM — E66.09 CLASS 1 OBESITY DUE TO EXCESS CALORIES WITH SERIOUS COMORBIDITY AND BODY MASS INDEX (BMI) OF 32.0 TO 32.9 IN ADULT: Status: ACTIVE | Noted: 2023-02-28

## 2023-02-28 PROBLEM — R06.09 DYSPNEA ON EXERTION: Status: ACTIVE | Noted: 2023-02-28

## 2023-02-28 PROBLEM — A41.9 SEPSIS (HCC): Status: ACTIVE | Noted: 2023-02-28

## 2023-02-28 LAB
2HR DELTA HS TROPONIN: 0 NG/L
4HR DELTA HS TROPONIN: 2 NG/L
ALBUMIN SERPL BCP-MCNC: 4.1 G/DL (ref 3.5–5)
ALP SERPL-CCNC: 63 U/L (ref 34–104)
ALT SERPL W P-5'-P-CCNC: 14 U/L (ref 7–52)
ANION GAP SERPL CALCULATED.3IONS-SCNC: 10 MMOL/L (ref 4–13)
ANION GAP SERPL CALCULATED.3IONS-SCNC: 6 MMOL/L (ref 4–13)
APTT PPP: 29 SECONDS (ref 23–37)
AST SERPL W P-5'-P-CCNC: 24 U/L (ref 13–39)
ATRIAL RATE: 115 BPM
ATRIAL RATE: 116 BPM
ATRIAL RATE: 220 BPM
BACTERIA UR QL AUTO: NORMAL /HPF
BASOPHILS # BLD AUTO: 0.06 THOUSANDS/ÂΜL (ref 0–0.1)
BASOPHILS NFR BLD AUTO: 1 % (ref 0–1)
BILIRUB SERPL-MCNC: 0.55 MG/DL (ref 0.2–1)
BILIRUB UR QL STRIP: ABNORMAL
BNP SERPL-MCNC: 202 PG/ML (ref 0–100)
BUN SERPL-MCNC: 20 MG/DL (ref 5–25)
BUN SERPL-MCNC: 20 MG/DL (ref 5–25)
CA-I BLD-SCNC: 1.1 MMOL/L (ref 1.12–1.32)
CA-I BLD-SCNC: 1.13 MMOL/L (ref 1.12–1.32)
CALCIUM SERPL-MCNC: 10 MG/DL (ref 8.4–10.2)
CALCIUM SERPL-MCNC: 9 MG/DL (ref 8.4–10.2)
CARDIAC TROPONIN I PNL SERPL HS: 10 NG/L
CARDIAC TROPONIN I PNL SERPL HS: 8 NG/L
CARDIAC TROPONIN I PNL SERPL HS: 8 NG/L
CHLORIDE SERPL-SCNC: 100 MMOL/L (ref 96–108)
CHLORIDE SERPL-SCNC: 101 MMOL/L (ref 96–108)
CLARITY UR: CLEAR
CO2 SERPL-SCNC: 25 MMOL/L (ref 21–32)
CO2 SERPL-SCNC: 27 MMOL/L (ref 21–32)
COLOR UR: YELLOW
CREAT SERPL-MCNC: 0.59 MG/DL (ref 0.6–1.3)
CREAT SERPL-MCNC: 0.61 MG/DL (ref 0.6–1.3)
D DIMER PPP FEU-MCNC: 1.3 UG/ML FEU
EOSINOPHIL # BLD AUTO: 0.1 THOUSAND/ÂΜL (ref 0–0.61)
EOSINOPHIL NFR BLD AUTO: 1 % (ref 0–6)
ERYTHROCYTE [DISTWIDTH] IN BLOOD BY AUTOMATED COUNT: 11.9 % (ref 11.6–15.1)
EST. AVERAGE GLUCOSE BLD GHB EST-MCNC: 134 MG/DL
FLUAV RNA RESP QL NAA+PROBE: NEGATIVE
FLUBV RNA RESP QL NAA+PROBE: NEGATIVE
GFR SERPL CREATININE-BSD FRML MDRD: 87 ML/MIN/1.73SQ M
GFR SERPL CREATININE-BSD FRML MDRD: 88 ML/MIN/1.73SQ M
GLUCOSE SERPL-MCNC: 147 MG/DL (ref 65–140)
GLUCOSE SERPL-MCNC: 159 MG/DL (ref 65–140)
GLUCOSE SERPL-MCNC: 165 MG/DL (ref 65–140)
GLUCOSE SERPL-MCNC: 198 MG/DL (ref 65–140)
GLUCOSE UR STRIP-MCNC: NEGATIVE MG/DL
HBA1C MFR BLD: 6.3 %
HCT VFR BLD AUTO: 43.6 % (ref 34.8–46.1)
HGB BLD-MCNC: 14.3 G/DL (ref 11.5–15.4)
HGB UR QL STRIP.AUTO: ABNORMAL
IMM GRANULOCYTES # BLD AUTO: 0.06 THOUSAND/UL (ref 0–0.2)
IMM GRANULOCYTES NFR BLD AUTO: 1 % (ref 0–2)
INR PPP: 1.02 (ref 0.84–1.19)
KETONES UR STRIP-MCNC: ABNORMAL MG/DL
LACTATE SERPL-SCNC: 0.9 MMOL/L (ref 0.5–2)
LEUKOCYTE ESTERASE UR QL STRIP: NEGATIVE
LYMPHOCYTES # BLD AUTO: 2.22 THOUSANDS/ÂΜL (ref 0.6–4.47)
LYMPHOCYTES NFR BLD AUTO: 17 % (ref 14–44)
MAGNESIUM SERPL-MCNC: 1.8 MG/DL (ref 1.9–2.7)
MAGNESIUM SERPL-MCNC: 2 MG/DL (ref 1.9–2.7)
MCH RBC QN AUTO: 32 PG (ref 26.8–34.3)
MCHC RBC AUTO-ENTMCNC: 32.8 G/DL (ref 31.4–37.4)
MCV RBC AUTO: 98 FL (ref 82–98)
MONOCYTES # BLD AUTO: 2.25 THOUSAND/ÂΜL (ref 0.17–1.22)
MONOCYTES NFR BLD AUTO: 17 % (ref 4–12)
NEUTROPHILS # BLD AUTO: 8.31 THOUSANDS/ÂΜL (ref 1.85–7.62)
NEUTS SEG NFR BLD AUTO: 63 % (ref 43–75)
NITRITE UR QL STRIP: NEGATIVE
NON-SQ EPI CELLS URNS QL MICRO: NORMAL /HPF
NRBC BLD AUTO-RTO: 0 /100 WBCS
P AXIS: 25 DEGREES
P AXIS: 28 DEGREES
PH UR STRIP.AUTO: 6 [PH]
PHOSPHATE SERPL-MCNC: 2.5 MG/DL (ref 2.3–4.1)
PHOSPHATE SERPL-MCNC: 2.7 MG/DL (ref 2.3–4.1)
PLATELET # BLD AUTO: 351 THOUSANDS/UL (ref 149–390)
PMV BLD AUTO: 9.5 FL (ref 8.9–12.7)
POTASSIUM SERPL-SCNC: 3.6 MMOL/L (ref 3.5–5.3)
POTASSIUM SERPL-SCNC: 3.8 MMOL/L (ref 3.5–5.3)
PR INTERVAL: 172 MS
PR INTERVAL: 178 MS
PROCALCITONIN SERPL-MCNC: 0.1 NG/ML
PROT SERPL-MCNC: 7.9 G/DL (ref 6.4–8.4)
PROT UR STRIP-MCNC: ABNORMAL MG/DL
PROTHROMBIN TIME: 13.5 SECONDS (ref 11.6–14.5)
QRS AXIS: -10 DEGREES
QRS AXIS: -4 DEGREES
QRS AXIS: 12 DEGREES
QRSD INTERVAL: 84 MS
QRSD INTERVAL: 84 MS
QRSD INTERVAL: 88 MS
QT INTERVAL: 212 MS
QT INTERVAL: 324 MS
QT INTERVAL: 336 MS
QTC INTERVAL: 402 MS
QTC INTERVAL: 448 MS
QTC INTERVAL: 467 MS
RBC # BLD AUTO: 4.47 MILLION/UL (ref 3.81–5.12)
RBC #/AREA URNS AUTO: NORMAL /HPF
RSV RNA RESP QL NAA+PROBE: NEGATIVE
SARS-COV-2 RNA RESP QL NAA+PROBE: NEGATIVE
SODIUM SERPL-SCNC: 134 MMOL/L (ref 135–147)
SODIUM SERPL-SCNC: 135 MMOL/L (ref 135–147)
SP GR UR STRIP.AUTO: 1.01 (ref 1–1.03)
T WAVE AXIS: -4 DEGREES
T WAVE AXIS: 229 DEGREES
T WAVE AXIS: 4 DEGREES
TSH SERPL DL<=0.05 MIU/L-ACNC: 0.95 UIU/ML (ref 0.45–4.5)
UROBILINOGEN UR QL STRIP.AUTO: 0.2 E.U./DL
VENTRICULAR RATE: 115 BPM
VENTRICULAR RATE: 116 BPM
VENTRICULAR RATE: 216 BPM
WBC # BLD AUTO: 13 THOUSAND/UL (ref 4.31–10.16)
WBC #/AREA URNS AUTO: NORMAL /HPF

## 2023-02-28 RX ORDER — METOPROLOL TARTRATE 5 MG/5ML
5 INJECTION INTRAVENOUS EVERY 6 HOURS PRN
Status: DISCONTINUED | OUTPATIENT
Start: 2023-02-28 | End: 2023-02-28

## 2023-02-28 RX ORDER — ONDANSETRON 2 MG/ML
4 INJECTION INTRAMUSCULAR; INTRAVENOUS EVERY 6 HOURS PRN
Status: DISCONTINUED | OUTPATIENT
Start: 2023-02-28 | End: 2023-03-02 | Stop reason: HOSPADM

## 2023-02-28 RX ORDER — METOPROLOL TARTRATE 5 MG/5ML
5 INJECTION INTRAVENOUS EVERY 6 HOURS PRN
Status: DISCONTINUED | OUTPATIENT
Start: 2023-02-28 | End: 2023-03-01

## 2023-02-28 RX ORDER — ATORVASTATIN CALCIUM 40 MG/1
40 TABLET, FILM COATED ORAL
Status: DISCONTINUED | OUTPATIENT
Start: 2023-02-28 | End: 2023-03-02 | Stop reason: HOSPADM

## 2023-02-28 RX ORDER — METOPROLOL TARTRATE 5 MG/5ML
5 INJECTION INTRAVENOUS ONCE
Status: COMPLETED | OUTPATIENT
Start: 2023-02-28 | End: 2023-02-28

## 2023-02-28 RX ORDER — POTASSIUM CHLORIDE 14.9 MG/ML
20 INJECTION INTRAVENOUS ONCE
Status: COMPLETED | OUTPATIENT
Start: 2023-02-28 | End: 2023-02-28

## 2023-02-28 RX ORDER — INSULIN LISPRO 100 [IU]/ML
1-6 INJECTION, SOLUTION INTRAVENOUS; SUBCUTANEOUS
Status: DISCONTINUED | OUTPATIENT
Start: 2023-02-28 | End: 2023-03-02 | Stop reason: HOSPADM

## 2023-02-28 RX ORDER — ALBUMIN, HUMAN INJ 5% 5 %
SOLUTION INTRAVENOUS
Status: COMPLETED
Start: 2023-02-28 | End: 2023-02-28

## 2023-02-28 RX ORDER — ADENOSINE 3 MG/ML
INJECTION, SOLUTION INTRAVENOUS
Status: DISPENSED
Start: 2023-02-28 | End: 2023-03-01

## 2023-02-28 RX ORDER — POTASSIUM CHLORIDE 14.9 MG/ML
20 INJECTION INTRAVENOUS ONCE
Status: COMPLETED | OUTPATIENT
Start: 2023-02-28 | End: 2023-03-01

## 2023-02-28 RX ORDER — GUAIFENESIN 600 MG/1
600 TABLET, EXTENDED RELEASE ORAL EVERY 12 HOURS SCHEDULED
Status: DISCONTINUED | OUTPATIENT
Start: 2023-02-28 | End: 2023-02-28

## 2023-02-28 RX ORDER — BENZONATATE 100 MG/1
100 CAPSULE ORAL 3 TIMES DAILY PRN
COMMUNITY

## 2023-02-28 RX ORDER — ALBUMIN, HUMAN INJ 5% 5 %
12.5 SOLUTION INTRAVENOUS ONCE
Status: COMPLETED | OUTPATIENT
Start: 2023-02-28 | End: 2023-03-01

## 2023-02-28 RX ORDER — ADENOSINE 3 MG/ML
6 INJECTION INTRAVENOUS ONCE
Status: COMPLETED | OUTPATIENT
Start: 2023-02-28 | End: 2023-02-28

## 2023-02-28 RX ORDER — ENOXAPARIN SODIUM 100 MG/ML
40 INJECTION SUBCUTANEOUS DAILY
Status: DISCONTINUED | OUTPATIENT
Start: 2023-03-01 | End: 2023-03-02 | Stop reason: HOSPADM

## 2023-02-28 RX ORDER — POTASSIUM CHLORIDE 20 MEQ/1
40 TABLET, EXTENDED RELEASE ORAL ONCE
Status: COMPLETED | OUTPATIENT
Start: 2023-02-28 | End: 2023-02-28

## 2023-02-28 RX ORDER — VERAPAMIL HYDROCHLORIDE 2.5 MG/ML
5 INJECTION, SOLUTION INTRAVENOUS 2 TIMES DAILY
Status: DISCONTINUED | OUTPATIENT
Start: 2023-02-28 | End: 2023-03-01

## 2023-02-28 RX ORDER — VERAPAMIL HYDROCHLORIDE 2.5 MG/ML
INJECTION, SOLUTION INTRAVENOUS
Status: COMPLETED
Start: 2023-02-28 | End: 2023-02-28

## 2023-02-28 RX ORDER — LEVALBUTEROL INHALATION SOLUTION 1.25 MG/3ML
1.25 SOLUTION RESPIRATORY (INHALATION) EVERY 4 HOURS PRN
Status: DISCONTINUED | OUTPATIENT
Start: 2023-02-28 | End: 2023-03-02 | Stop reason: HOSPADM

## 2023-02-28 RX ORDER — ASPIRIN 81 MG/1
81 TABLET ORAL DAILY
COMMUNITY

## 2023-02-28 RX ORDER — VERAPAMIL HYDROCHLORIDE 40 MG/1
40 TABLET ORAL EVERY 8 HOURS SCHEDULED
Status: DISCONTINUED | OUTPATIENT
Start: 2023-02-28 | End: 2023-03-01

## 2023-02-28 RX ORDER — CEFTRIAXONE 1 G/50ML
1000 INJECTION, SOLUTION INTRAVENOUS EVERY 24 HOURS
Status: DISCONTINUED | OUTPATIENT
Start: 2023-02-28 | End: 2023-03-02 | Stop reason: HOSPADM

## 2023-02-28 RX ORDER — MAGNESIUM SULFATE HEPTAHYDRATE 40 MG/ML
2 INJECTION, SOLUTION INTRAVENOUS ONCE
Status: COMPLETED | OUTPATIENT
Start: 2023-02-28 | End: 2023-02-28

## 2023-02-28 RX ORDER — MAGNESIUM SULFATE HEPTAHYDRATE 40 MG/ML
INJECTION, SOLUTION INTRAVENOUS
Status: COMPLETED
Start: 2023-02-28 | End: 2023-02-28

## 2023-02-28 RX ORDER — METOPROLOL TARTRATE 50 MG/1
50 TABLET, FILM COATED ORAL EVERY 12 HOURS SCHEDULED
Status: DISCONTINUED | OUTPATIENT
Start: 2023-02-28 | End: 2023-02-28

## 2023-02-28 RX ORDER — DILTIAZEM HYDROCHLORIDE 5 MG/ML
20 INJECTION INTRAVENOUS ONCE
Status: COMPLETED | OUTPATIENT
Start: 2023-02-28 | End: 2023-02-28

## 2023-02-28 RX ORDER — ASPIRIN 81 MG/1
81 TABLET ORAL DAILY
Status: DISCONTINUED | OUTPATIENT
Start: 2023-03-01 | End: 2023-03-02 | Stop reason: HOSPADM

## 2023-02-28 RX ORDER — DOCUSATE SODIUM 100 MG/1
100 CAPSULE, LIQUID FILLED ORAL 2 TIMES DAILY
Status: DISCONTINUED | OUTPATIENT
Start: 2023-02-28 | End: 2023-03-02

## 2023-02-28 RX ORDER — ADENOSINE 3 MG/ML
6 INJECTION INTRAVENOUS ONCE
Status: DISCONTINUED | OUTPATIENT
Start: 2023-02-28 | End: 2023-02-28

## 2023-02-28 RX ORDER — BENZONATATE 100 MG/1
100 CAPSULE ORAL 3 TIMES DAILY PRN
Status: DISCONTINUED | OUTPATIENT
Start: 2023-02-28 | End: 2023-03-02 | Stop reason: HOSPADM

## 2023-02-28 RX ORDER — METOPROLOL SUCCINATE 50 MG/1
50 TABLET, EXTENDED RELEASE ORAL 2 TIMES DAILY
Status: DISCONTINUED | OUTPATIENT
Start: 2023-02-28 | End: 2023-02-28

## 2023-02-28 RX ORDER — LOSARTAN POTASSIUM 25 MG/1
25 TABLET ORAL 2 TIMES DAILY
Status: DISCONTINUED | OUTPATIENT
Start: 2023-02-28 | End: 2023-02-28

## 2023-02-28 RX ORDER — ADENOSINE 3 MG/ML
INJECTION, SOLUTION INTRAVENOUS
Status: COMPLETED
Start: 2023-02-28 | End: 2023-02-28

## 2023-02-28 RX ORDER — ACETAMINOPHEN 325 MG/1
650 TABLET ORAL EVERY 6 HOURS PRN
Status: DISCONTINUED | OUTPATIENT
Start: 2023-02-28 | End: 2023-03-02 | Stop reason: HOSPADM

## 2023-02-28 RX ADMIN — ACETAMINOPHEN 650 MG: 325 TABLET ORAL at 20:13

## 2023-02-28 RX ADMIN — SODIUM CHLORIDE 1000 ML: 0.9 INJECTION, SOLUTION INTRAVENOUS at 10:55

## 2023-02-28 RX ADMIN — ADENOSINE 6 MG: 3 INJECTION INTRAVENOUS at 14:08

## 2023-02-28 RX ADMIN — POTASSIUM CHLORIDE 40 MEQ: 20 TABLET, EXTENDED RELEASE ORAL at 17:36

## 2023-02-28 RX ADMIN — IOHEXOL 85 ML: 350 INJECTION, SOLUTION INTRAVENOUS at 16:12

## 2023-02-28 RX ADMIN — METOPROLOL SUCCINATE 50 MG: 50 TABLET, EXTENDED RELEASE ORAL at 17:36

## 2023-02-28 RX ADMIN — VERAPAMIL HYDROCHLORIDE 40 MG: 40 TABLET, FILM COATED ORAL at 22:32

## 2023-02-28 RX ADMIN — METOROPROLOL TARTRATE 5 MG: 5 INJECTION, SOLUTION INTRAVENOUS at 16:27

## 2023-02-28 RX ADMIN — METOROPROLOL TARTRATE 5 MG: 5 INJECTION, SOLUTION INTRAVENOUS at 16:22

## 2023-02-28 RX ADMIN — LEVALBUTEROL HYDROCHLORIDE 1.25 MG: 1.25 SOLUTION RESPIRATORY (INHALATION) at 21:05

## 2023-02-28 RX ADMIN — MAGNESIUM SULFATE HEPTAHYDRATE 2 G: 40 INJECTION, SOLUTION INTRAVENOUS at 21:40

## 2023-02-28 RX ADMIN — METOPROLOL TARTRATE 5 MG: 5 INJECTION INTRAVENOUS at 14:20

## 2023-02-28 RX ADMIN — ALBUMIN (HUMAN) 12.5 G: 12.5 INJECTION, SOLUTION INTRAVENOUS at 21:37

## 2023-02-28 RX ADMIN — INSULIN LISPRO 1 UNITS: 100 INJECTION, SOLUTION INTRAVENOUS; SUBCUTANEOUS at 18:14

## 2023-02-28 RX ADMIN — METOPROLOL TARTRATE 25 MG: 25 TABLET, FILM COATED ORAL at 10:57

## 2023-02-28 RX ADMIN — VERAPAMIL HYDROCHLORIDE 5 MG: 2.5 INJECTION, SOLUTION INTRAVENOUS at 21:50

## 2023-02-28 RX ADMIN — ADENOSINE 6 MG: 3 INJECTION INTRAVENOUS at 21:25

## 2023-02-28 RX ADMIN — POTASSIUM CHLORIDE 20 MEQ: 14.9 INJECTION, SOLUTION INTRAVENOUS at 23:41

## 2023-02-28 RX ADMIN — DILTIAZEM HYDROCHLORIDE 20 MG: 5 INJECTION INTRAVENOUS at 10:54

## 2023-02-28 RX ADMIN — POTASSIUM CHLORIDE 20 MEQ: 14.9 INJECTION, SOLUTION INTRAVENOUS at 21:38

## 2023-02-28 RX ADMIN — ATORVASTATIN CALCIUM 40 MG: 40 TABLET, FILM COATED ORAL at 17:36

## 2023-02-28 RX ADMIN — METOPROLOL TARTRATE 25 MG: 25 TABLET, FILM COATED ORAL at 16:36

## 2023-02-28 RX ADMIN — VERAPAMIL HYDROCHLORIDE 5 MG: 2.5 INJECTION INTRAVENOUS at 21:50

## 2023-02-28 RX ADMIN — CEFTRIAXONE 1000 MG: 1 INJECTION, SOLUTION INTRAVENOUS at 17:29

## 2023-02-28 RX ADMIN — METOPROLOL TARTRATE 2.5 MG: 5 INJECTION INTRAVENOUS at 21:31

## 2023-02-28 NOTE — ED PROVIDER NOTES
History  Chief Complaint   Patient presents with   • Rapid Heart Rate     Seen at pcp and did nothing for her the other day  EMS reports sx been going for about a week  Is a 70-year-old female with history of SVT diabetes and hypertension presents to the emergency department complaining of cough and palpitations patient reports she was seen by PCP yesterday for similar symptoms did have heart rate of 140 and office was prescribed azithromycin and Tessalon Perles  Patient reports getting increased SVT and palpitations when coughing frequently  Patient reports she took her heart rate at home which was 160 called PCP and was advised to come to the ED  patient reports frequent intermittent palpitations  Patient was diagnosed with SVT in the ER on 1/8 cardiology was consulted at that time patient was advised to follow-up outpatient with cardiology that follow-up appointment is still upcoming  History provided by:  Patient  Rapid Heart Rate  Palpitations quality:  Fast  Onset quality:  Gradual  Timing:  Intermittent  Progression:  Worsening  Chronicity:  Recurrent  Associated symptoms: cough and shortness of breath    Associated symptoms: no chest pain, no dizziness, no nausea, no numbness, no vomiting and no weakness        Prior to Admission Medications   Prescriptions Last Dose Informant Patient Reported? Taking?    Cholecalciferol 125 MCG (5000 UT) capsule   Yes No   Sig: Take 1 tablet by mouth daily   Melatonin 5 MG CAPS   Yes No   Sig: Take by mouth   Multiple Vitamins-Minerals (CENTRUM SILVER PO)   Yes No   Sig: Take by mouth   losartan (COZAAR) 25 mg tablet   Yes No   Sig: Take 25 mg by mouth daily   metFORMIN (GLUCOPHAGE) 500 mg tablet   Yes No   Sig: Take 500 mg by mouth daily   metoprolol tartrate (LOPRESSOR) 25 mg tablet   Yes No   Sig: Take 25 mg by mouth 2 (two) times a day   rosuvastatin (CRESTOR) 5 mg tablet   Yes No   Sig: Take 5 mg by mouth      Facility-Administered Medications: None Past Medical History:   Diagnosis Date   • [facility] mellitus [facility] )    • Hypertension    • SVT (supraventricular tachycardia) ([facility] )        Past Surgical History:   Procedure Laterality Date   • BREAST SURGERY     •  SECTION     • NOSE SURGERY     • TONSILLECTOMY         History reviewed  No pertinent family history  I have reviewed and agree with the history as documented  E-Cigarette/Vaping   • E-Cigarette Use Never User      E-Cigarette/Vaping Substances     Social History     Tobacco Use   • Smoking status: Former   • Smokeless tobacco: Never   Vaping Use   • Vaping Use: Never used   Substance Use Topics   • Alcohol use: Not Currently   • Drug use: Not Currently       Review of Systems   Constitutional: Negative for activity change, appetite change, chills, fatigue and fever  HENT: Positive for congestion  Negative for ear pain, rhinorrhea and sore throat  Eyes: Negative for discharge, redness and visual disturbance  Respiratory: Positive for cough and shortness of breath  Negative for chest tightness and wheezing  Cardiovascular: Positive for palpitations  Negative for chest pain  Gastrointestinal: Negative for abdominal pain, constipation, diarrhea, nausea and vomiting  Endocrine: Negative for polydipsia and polyuria  Genitourinary: Negative for difficulty urinating, dysuria, frequency, hematuria and urgency  Musculoskeletal: Negative for arthralgias and myalgias  Skin: Negative for color change, pallor and rash  Neurological: Negative for dizziness, weakness, light-headedness, numbness and headaches  Hematological: Negative for adenopathy  Does not bruise/bleed easily  All other systems reviewed and are negative  Physical Exam  Physical Exam  Vitals and nursing note reviewed  Constitutional:       Appearance: She is well-developed  HENT:      Head: Normocephalic and atraumatic        Right Ear: External ear normal       Left Ear: External ear normal  Nose: Nose normal    Eyes:      Conjunctiva/sclera: Conjunctivae normal       Pupils: Pupils are equal, round, and reactive to light  Cardiovascular:      Rate and Rhythm: Regular rhythm  Tachycardia present  Heart sounds: Normal heart sounds  Pulmonary:      Effort: Pulmonary effort is normal  No respiratory distress  Breath sounds: Rales present  No wheezing  Chest:      Chest wall: No tenderness  Abdominal:      General: Bowel sounds are normal  There is no distension  Palpations: Abdomen is soft  Tenderness: There is no abdominal tenderness  There is no guarding  Musculoskeletal:         General: Normal range of motion  Cervical back: Normal range of motion and neck supple  Skin:     General: Skin is warm and dry  Neurological:      Mental Status: She is alert and oriented to person, place, and time  Cranial Nerves: No cranial nerve deficit  Sensory: No sensory deficit           Vital Signs  ED Triage Vitals [02/28/23 1045]   Temperature Pulse Respirations Blood Pressure SpO2   (!) 96 8 °F (36 °C) (!) 170 18 154/92 90 %      Temp Source Heart Rate Source Patient Position - Orthostatic VS BP Location FiO2 (%)   Temporal Monitor Sitting Right arm --      Pain Score       No Pain           Vitals:    02/28/23 1145 02/28/23 1215 02/28/23 1359 02/28/23 1401   BP: 97/54 102/58  114/58   Pulse: 96 99 (!) 200 (!) 111   Patient Position - Orthostatic VS:    Sitting         Visual Acuity      ED Medications  Medications   sodium chloride 0 9 % bolus 1,000 mL (1,000 mL Intravenous New Bag 2/28/23 1055)   adenosine (ADENOCARD) injection 6 mg (has no administration in time range)   diltiazem (CARDIZEM) injection 20 mg (20 mg Intravenous Given 2/28/23 1054)   metoprolol tartrate (LOPRESSOR) tablet 25 mg (25 mg Oral Given 2/28/23 1057)       Diagnostic Studies  Results Reviewed     Procedure Component Value Units Date/Time    UA w Reflex to Microscopic w Reflex to Culture [[id number]]     Lab Status: No result Specimen: Urine     HS Troponin I 2hr [[id number]]  (Normal) Collected: 02/28/23 1255    Lab Status: Final result Specimen: Blood from Arm, Right Updated: 02/28/23 1324     hs TnI 2hr 8 ng/L      Delta 2hr hsTnI 0 ng/L     TSH [[id number]]  (Normal) Collected: 02/28/23 1053    Lab Status: Final result Specimen: Blood from Arm, Right Updated: 02/28/23 1207     TSH 3RD GENERATON 0 953 uIU/mL     Narrative:      Patients undergoing fluorescein dye angiography may retain small amounts of fluorescein in the body for 48-72 hours post procedure  Samples containing fluorescein can produce falsely depressed TSH values  If the patient had this procedure,a specimen should be resubmitted post fluorescein clearance  HS Troponin I 4hr [[id number]]     Lab Status: No result Specimen: Blood     FLU/RSV/COVID - if FLU/RSV clinically relevant [[id number]]  (Normal) Collected: 02/28/23 1053    Lab Status: Final result Specimen: Nares from Nose Updated: 02/28/23 1140     SARS-CoV-2 Negative     INFLUENZA A PCR Negative     INFLUENZA B PCR Negative     RSV PCR Negative    Narrative:      FOR PEDIATRIC PATIENTS - copy/paste COVID Guidelines URL to browser: [email or web address]  [employer]    SARS-CoV-2 assay is a Nucleic Acid Amplification assay intended for the  qualitative detection of nucleic acid from SARS-CoV-2 in nasopharyngeal  swabs  Results are for the presumptive identification of SARS-CoV-2 RNA  Positive results are indicative of infection with SARS-CoV-2, the virus  causing COVID-19, but do not rule out bacterial infection or co-infection  with other viruses  Laboratories within the [place] and its  territories are required to report all positive results to the appropriate  public health authorities   Negative results do not preclude SARS-CoV-2  infection and should not be used as the sole basis for treatment or other  patient management decisions  Negative results must be combined with  clinical observations, patient history, and epidemiological information  This test has not been FDA cleared or approved  This test has been authorized by FDA under an Emergency Use Authorization  (EUA)  This test is only authorized for the duration of time the  declaration that circumstances exist justifying the authorization of the  emergency use of an in vitro diagnostic tests for detection of SARS-CoV-2  virus and/or diagnosis of COVID-19 infection under section 564(b)(1) of  the Act, 21 U  S C  078AUX-5(L)(6), unless the authorization is terminated  or revoked sooner  The test has been validated but independent review by FDA  and CLIA is pending  Test performed using [employer]: This RT-PCR assay targets N2,  a region unique to SARS-CoV-2  A conserved region in the E-gene was chosen  for pan-Sarbecovirus detection which includes SARS-CoV-2  According to CMS-2020-01-R, this platform meets the definition of high-throughput technology      Comprehensive metabolic panel [[id number]]  (Abnormal) Collected: 02/28/23 1053    Lab Status: Final result Specimen: Blood from Arm, Right Updated: 02/28/23 1129     Sodium 135 mmol/L      Potassium 3 8 mmol/L      Chloride 100 mmol/L      CO2 25 mmol/L      ANION GAP 10 mmol/L      BUN 20 mg/dL      Creatinine 0 61 mg/dL      Glucose 159 mg/dL      Calcium 10 0 mg/dL      AST 24 U/L      ALT 14 U/L      Alkaline Phosphatase 63 U/L      Total Protein 7 9 g/dL      Albumin 4 1 g/dL      Total Bilirubin 0 55 mg/dL      eGFR 87 ml/min/1 73sq m     Narrative:      J Luis guidelines for Chronic Kidney Disease (CKD):   •  Stage 1 with normal or high GFR (GFR > 90 mL/min/1 73 square meters)  •  Stage 2 Mild CKD (GFR = 60-89 mL/min/1 73 square meters)  •  Stage 3A Moderate CKD (GFR = 45-59 mL/min/1 73 square meters)  •  Stage 3B Moderate CKD (GFR = 30-44 mL/min/1 73 square meters)  •  Stage 4 Severe CKD (GFR = 15-29 mL/min/1 73 square meters)  •  Stage 5 End Stage CKD (GFR <15 mL/min/1 73 square meters)  Note: GFR calculation is accurate only with a steady state creatinine    Magnesium [[id number]]  (Normal) Collected: 02/28/23 1053    Lab Status: Final result Specimen: Blood from Arm, Right Updated: 02/28/23 1129     Magnesium 2 0 mg/dL     Phosphorus [[id number]]  (Normal) Collected: 02/28/23 1053    Lab Status: Final result Specimen: Blood from Arm, Right Updated: 02/28/23 1129     Phosphorus 2 7 mg/dL     HS Troponin 0hr (reflex protocol) [[id number]]  (Normal) Collected: 02/28/23 1053    Lab Status: Final result Specimen: Blood from Arm, Right Updated: 02/28/23 1128     hs TnI 0hr 8 ng/L     B-Type Natriuretic Peptide(BNP) [[id number]]  (Abnormal) Collected: 02/28/23 1053    Lab Status: Final result Specimen: Blood from Arm, Right Updated: 02/28/23 1127      pg/mL     Protime-INR [[id number]]  (Normal) Collected: 02/28/23 1053    Lab Status: Final result Specimen: Blood from Arm, Right Updated: 02/28/23 1116     Protime 13 5 seconds      INR 1 02    APTT [[id number]]  (Normal) Collected: 02/28/23 1053    Lab Status: Final result Specimen: Blood from Arm, Right Updated: 02/28/23 1116     PTT 29 seconds     Calcium, ionized [[id number]]  (Abnormal) Collected: 02/28/23 1053    Lab Status: Final result Specimen: Blood from Arm, Right Updated: 02/28/23 1107     Calcium, Ionized 1 10 mmol/L     CBC and differential [[id number]]  (Abnormal) Collected: 02/28/23 1053    Lab Status: Final result Specimen: Blood from Arm, Right Updated: 02/28/23 1102     WBC 13 00 Thousand/uL      RBC 4 47 Million/uL      Hemoglobin 14 3 g/dL      Hematocrit 43 6 %      MCV 98 fL      MCH 32 0 pg      MCHC 32 8 g/dL      RDW 11 9 %      MPV 9 5 fL      Platelets 983 Thousands/uL      nRBC 0 /100 WBCs      Neutrophils Relative 63 %      Immat GRANS % 1 %      Lymphocytes Relative 17 %      Monocytes Relative 17 % Eosinophils Relative 1 %      Basophils Relative 1 %      Neutrophils Absolute 8 31 Thousands/µL      Immature Grans Absolute 0 06 Thousand/uL      Lymphocytes Absolute 2 22 Thousands/µL      Monocytes Absolute 2 25 Thousand/µL      Eosinophils Absolute 0 10 Thousand/µL      Basophils Absolute 0 06 Thousands/µL                  XR chest 1 view portable   ED Interpretation by Alexei Smith DO (02/28 1128)   No Acute cardiopulmonary disease      Final Result by Zeyad Marrero MD (02/28 1312)      Mild pulmonary vascular congestion  Workstation performed: [id number]                    Procedures  ECG 12 Lead Documentation Only    Date/Time: 2/28/2023 10:51 AM  Performed by: Alexei Smith DO  Authorized by: Alexei Smith DO     ECG reviewed by me, the ED Provider: yes    Patient location:  ED  Previous ECG:     Comparison to cardiac monitor: Yes    Quality:     Tracing quality:  Limited by artifact  Rate:     ECG rate:  167    ECG rate assessment: tachycardic    Rhythm:     Rhythm: SVT    QRS:     QRS axis:  Left    QRS intervals:  Normal  Conduction:     Conduction: normal    ST segments:     ST segments:  Non-specific  T waves:     T waves: non-specific               ED Course  ED Course as of 02/28/23 1402   Tue Feb 28, 2023   1104 Following IV Cardizem patient now in normal sinus rhythm heart rate 98 bpm   1340 Spoke With hospitalist on-call Dr Daksha Valdez and Dr Jose Juan [facility] cardiology  reviewed case and findings in the emergency department and management thus far accepts for admission  SBIRT 20yo+    Flowsheet Row Most Recent Value   SBIRT (25 yo +)    In order to provide better care to our patients, we are screening all of our patients for alcohol and drug use  Would it be okay to ask you these screening questions? Yes Filed at: 02/28/2023 1059   Initial Alcohol Screen: US AUDIT-C     1   How often do you have a drink containing alcohol? 0 Filed at: 02/28/2023 1059   2  How many drinks containing alcohol do you have on a typical day you are drinking? 0 Filed at: 02/28/2023 1059   3a  Male UNDER 65: How often do you have five or more drinks on one occasion? 0 Filed at: 02/28/2023 1059   3b  FEMALE Any Age, or MALE 65+: How often do you have 4 or more drinks on one occassion? 0 Filed at: 02/28/2023 1059   Audit-C Score 0 Filed at: 02/28/2023 1059   CAROL: How many times in the past year have you    Used an illegal drug or used a prescription medication for non-medical reasons? Never Filed at: 02/28/2023 1059                    Medical Decision Making  Atrial flutter [facility]): acute illness or injury  Palpitations: acute illness or injury  Pulmonary vascular congestion: acute illness or injury  SVT (supraventricular tachycardia) ([facility] ): acute illness or injury  Amount and/or Complexity of Data Reviewed  Labs: ordered  Decision-making details documented in ED Course  Radiology: ordered and independent interpretation performed  Decision-making details documented in ED Course  ECG/medicine tests: ordered and independent interpretation performed  Decision-making details documented in ED Course  Risk  Prescription drug management  Decision regarding hospitalization            Disposition  Final diagnoses:   SVT (supraventricular tachycardia) (HCC)   Atrial flutter (HCC)   Palpitations   Pulmonary vascular congestion     Time reflects when diagnosis was documented in both MDM as applicable and the Disposition within this note     Time User Action Codes Description Comment    2/28/2023 12:37 PM Jeff Teran Add [I47 1] SVT (supraventricular tachycardia) ([facility] )     2/28/2023 12:37 PM Jeff Teran Add [I48 92] Atrial flutter ([facility] )     2/28/2023 12:44 PM Jeff Teran Add [R00 2] Palpitations     2/28/2023  1:32 PM Jeff Teran Add [R09 89] Pulmonary vascular congestion       ED Disposition     ED Disposition   Admit    Condition   Stable    Date/Time   Tue Feb 28, 2023 12:39 PM    Comment   Case was discussed with Dr Mariela Lewis and the patient's admission status was agreed to be Admission Status: inpatient status to the service of Dr Mariela Lewis  Follow-up Information    None         Patient's Medications   Discharge Prescriptions    No medications on file       No discharge procedures on file      PDMP Review     None          ED Provider  Electronically Signed by           Eligha Schwab, DO  02/28/23 4229

## 2023-02-28 NOTE — ASSESSMENT & PLAN NOTE
Patient met criteria with tachycardia, elevated WBC, with possible source of infection is pneumonia suspected  Patient recently started azithromycin, continue ceftriaxone  CTA PE negative for PE, but patient has bilateral lower lobe consolidation, suspect bronchiolitis/pneumonia  Calcitonin x1 negative, check a m  procalcitonin  Patient recently had COVID in early January  Check urine Legionella, pneumonia, blood culture  Respiratory protocol

## 2023-02-28 NOTE — RAPID RESPONSE
Rapid Response Note  Angelo Ojeda 68 y o  female MRN: [id number]  Unit/Bed#: -01 Encounter: [id number]    Rapid Response Notification(s):   Response called date/time:  2/28/2023 4:22 PM  Response team arrival date/time:  2/28/2023 4:22 PM  Response end date/time:  2/28/2023 4:36 PM  Level of care:  Medsur  [facility]  Primary reason for rapid response call:  Acute change in heart rate    Rapid Response Intervention(s):   Airway:  None  Breathing:  None  Circulation:  None  Fluids administered:  None  Medications administered: Other (comment) (metoprolol IV and PO)       Assessment:   · SVT, rate > 200    Plan:   · Lopressor 5 mg IV 1, repeat 5mg IV x1  · Lopressor 25 mg PO now  · EKG when HR slowed down to rate 110s revealed sinus tachycardia     Rapid Response Outcome:   Transfer:  Transfer to [facility]  Primary service notified of transfer: Yes    Code Status: Level 1 (Full Code)      Family notified of transfer: yes  Family member contacted: Son at bedside     Background/Situation:   Angelo Ojeda is a 68 y o  female who with PMHx of SVT, DM, HTN, presented to [address] [place] [address] with c/o cough and palpitations  SVT in ED and given multiple medications including Cardizem 20 mg IV, lopressor 5 mg IV, adenosine 6 mg Iv and lopressor 25 mg PO  HR improved and patient was admitted to internal medicine service  RRT called for SVT, HR > 200  Lopressor 5 mg IV given x2 followed by lopressor 25 mg PO  HR improved to 100s-110 after IV lopressor  EKG showed Sinus tachycardia  Patient transfer to [place] [address]  Review of Systems   Constitutional: Negative  HENT: Negative  Respiratory: Negative for shortness of breath  Cardiovascular: Positive for palpitations  Negative for chest pain  Gastrointestinal: Negative  Genitourinary: Negative  Musculoskeletal: Negative  Neurological: Negative  Hematological: Negative  Psychiatric/Behavioral: Negative          Objective: Vitals:    02/28/23 1633 02/28/23 1634 02/28/23 1636 02/28/23 1700   BP:  127/91  122/61   BP Location:       Pulse: (!) 109  (!) 108 (!) 114   Resp:    20   Temp:    97 7 °F (36 5 °C)   TempSrc:    Temporal   SpO2: 92%  92% 94%   Weight:       Height:         Physical Exam  Vitals reviewed  Constitutional:       General: She is not in acute distress  Appearance: She is not ill-appearing  HENT:      Nose: Nose normal       Mouth/Throat:      Mouth: Mucous membranes are moist       Pharynx: Oropharynx is clear  Eyes:      Conjunctiva/sclera: Conjunctivae normal    Cardiovascular:      Rate and Rhythm: Regular rhythm  Tachycardia present  Pulses: Normal pulses  Pulmonary:      Effort: Pulmonary effort is normal  No respiratory distress  Breath sounds: Normal breath sounds  Abdominal:      General: Abdomen is flat  Bowel sounds are normal       Palpations: Abdomen is soft  Musculoskeletal:         General: No swelling  Cervical back: No rigidity  Skin:     General: Skin is warm and dry  Capillary Refill: Capillary refill takes less than 2 seconds  Coloration: Skin is not pale  Neurological:      General: No focal deficit present  Mental Status: She is alert and oriented to person, place, and time  Psychiatric:         Mood and Affect: Mood normal          Portions of the record may have been created with voice recognition software  Occasional wrong word or "sound a like" substitutions may have occurred due to the inherent limitations of voice recognition software  Read the chart carefully and recognize, using context, where substitutions have occurred      Lu Dave

## 2023-02-28 NOTE — PLAN OF CARE
Problem: Potential for Falls  Goal: Patient will remain free of falls  Description: INTERVENTIONS:  - Educate patient/family on patient safety including physical limitations  - Instruct patient to call for assistance with activity   - Consult OT/PT to assist with strengthening/mobility   - Keep Call bell within reach  - Keep bed low and locked with side rails adjusted as appropriate  - Keep care items and personal belongings within reach  - Initiate and maintain comfort rounds  - Make Fall Risk Sign visible to staff  - Offer Toileting every   Hours, in advance of need  - Initiate/Maintain   alarm  - Obtain necessary fall risk management equipment:      - Apply yellow socks and bracelet for high fall risk patients  - Consider moving patient to room near nurses station  Outcome: Progressing     Problem: MOBILITY - ADULT  Goal: Maintain or return to baseline ADL function  Description: INTERVENTIONS:  -  Assess patient's ability to carry out ADLs; assess patient's baseline for ADL function and identify physical deficits which impact ability to perform ADLs (bathing, care of mouth/teeth, toileting, grooming, dressing, etc )  - Assess/evaluate cause of self-care deficits   - Assess range of motion  - Assess patient's mobility; develop plan if impaired  - Assess patient's need for assistive devices and provide as appropriate  - Encourage maximum independence but intervene and supervise when necessary  - Involve family in performance of ADLs  - Assess for home care needs following discharge   - Consider OT consult to assist with ADL evaluation and planning for discharge  - Provide patient education as appropriate  Outcome: Progressing  Goal: Maintains/Returns to pre admission functional level  Description: INTERVENTIONS:  - Perform BMAT or MOVE assessment daily    - Set and communicate daily mobility goal to care team and patient/family/caregiver     - Collaborate with rehabilitation services on mobility goals if consulted  - Perform Range of Motion   times a day  - Reposition patient every   hours  - Dangle patient   times a day  - Stand patient   times a day  - Ambulate patient   times a day  - Out of bed to chair   times a day   - Out of bed for meals     times a day  - Out of bed for toileting  - Record patient progress and toleration of activity level   Outcome: Progressing     Problem: PAIN - ADULT  Goal: Verbalizes/displays adequate comfort level or baseline comfort level  Description: Interventions:  - Encourage patient to monitor pain and request assistance  - Assess pain using appropriate pain scale  - Administer analgesics based on type and severity of pain and evaluate response  - Implement non-pharmacological measures as appropriate and evaluate response  - Consider cultural and social influences on pain and pain management  - Notify physician/advanced practitioner if interventions unsuccessful or patient reports new pain  Outcome: Progressing     Problem: SAFETY ADULT  Goal: Patient will remain free of falls  Description: INTERVENTIONS:  - Educate patient/family on patient safety including physical limitations  - Instruct patient to call for assistance with activity   - Consult OT/PT to assist with strengthening/mobility   - Keep Call bell within reach  - Keep bed low and locked with side rails adjusted as appropriate  - Keep care items and personal belongings within reach  - Initiate and maintain comfort rounds  - Make Fall Risk Sign visible to staff  - Offer Toileting every   Hours, in advance of need  - Initiate/Maintain   alarm  - Obtain necessary fall risk management equipment:     - Apply yellow socks and bracelet for high fall risk patients  - Consider moving patient to room near nurses station  Outcome: Progressing  Goal: Maintain or return to baseline ADL function  Description: INTERVENTIONS:  -  Assess patient's ability to carry out ADLs; assess patient's baseline for ADL function and identify physical deficits which impact ability to perform ADLs (bathing, care of mouth/teeth, toileting, grooming, dressing, etc )  - Assess/evaluate cause of self-care deficits   - Assess range of motion  - Assess patient's mobility; develop plan if impaired  - Assess patient's need for assistive devices and provide as appropriate  - Encourage maximum independence but intervene and supervise when necessary  - Involve family in performance of ADLs  - Assess for home care needs following discharge   - Consider OT consult to assist with ADL evaluation and planning for discharge  - Provide patient education as appropriate  Outcome: Progressing  Goal: Maintains/Returns to pre admission functional level  Description: INTERVENTIONS:  - Perform BMAT or MOVE assessment daily    - Set and communicate daily mobility goal to care team and patient/family/caregiver  - Collaborate with rehabilitation services on mobility goals if consulted  - Perform Range of Motion   times a day  - Reposition patient every   hours  - Dangle patient   times a day  - Stand patient   times a day  - Ambulate patient   times a day  - Out of bed to chair   times a day   - Out of bed for meals     times a day  - Out of bed for toileting  - Record patient progress and toleration of activity level   Outcome: Progressing     Problem: DISCHARGE PLANNING  Goal: Discharge to home or other facility with appropriate resources  Description: INTERVENTIONS:  - Identify barriers to discharge w/patient and caregiver  - Arrange for needed discharge resources and transportation as appropriate  - Identify discharge learning needs (meds, wound care, etc )  - Arrange for interpretive services to assist at discharge as needed  - Refer to Case Management Department for coordinating discharge planning if the patient needs post-hospital services based on physician/advanced practitioner order or complex needs related to functional status, cognitive ability, or social support system  Outcome: Progressing     Problem: Knowledge Deficit  Goal: Patient/family/caregiver demonstrates understanding of disease process, treatment plan, medications, and discharge instructions  Description: Complete learning assessment and assess knowledge base    Interventions:  - Provide teaching at level of understanding  - Provide teaching via preferred learning methods  Outcome: Progressing

## 2023-02-28 NOTE — ASSESSMENT & PLAN NOTE
Most likely gram-negative  Continue IV antibiotic  Blood culture, urine Legionella, pneumonia  COVID/flu panel negative-patient has recent history of COVID in early January  Follow respiratory protocol

## 2023-02-28 NOTE — NURSING NOTE
Pt returned from CT scan  Placed back on tele and noted to be in SVT in the 200's  Pt denied SOB, chest heaviness, no palpations  Lopressor 5mg IV was given and RR was called

## 2023-02-28 NOTE — H&P
[address]  H&P- Luis Miguel Rodríguez 1945, 68 y o  female MRN: [id number]  Unit/Bed#: -01 Encounter: [id number]  Primary Care Provider: Alexandria Rust MD   Date and time admitted to hospital: 2/28/2023 10:38 AM    SVT (supraventricular tachycardia) ([address] )  Assessment & Plan  Could be aggravated by  Sepsis  Patient is status post dose of adenosine, IV Lopressor, p o   Lopressor, Cardizem  On the floor, RRT called on this patient due to above reason  Patient level of care updated  TSH normal, magnesium normal  Potassium 3 8, will keep potassium more than 4  Follow cardiology recommendation, follow repeat echocardiogram    * Sepsis [facility])  Assessment & Plan  Patient met criteria with tachycardia, elevated WBC, with possible source of infection is pneumonia suspected  Patient recently started azithromycin, continue ceftriaxone  CTA PE negative for PE, but patient has bilateral lower lobe consolidation, suspect bronchiolitis/pneumonia  Calcitonin x1 negative, check a m  procalcitonin  Patient recently had COVID in early January  Check urine Legionella, pneumonia, blood culture  Respiratory protocol    Class 1 obesity due to excess calories with serious comorbidity and body mass index (BMI) of 32 0 to 32 9 in adult  Assessment & Plan  Lifestyle modification    Sleep apnea  Assessment & Plan  Continue CPAP    Dyspnea on exertion  Assessment & Plan  Suspect cardiac in origin-since patient was having orthopnea, leg swelling  Patient had history of cardiac cath in [facility] at [facility]  Now having SVT which can aggravate patient problems  CTA PE negative  Check echocardiogram  Status post 1 L of IV bolus-continue to monitor, consider Lasix if appropriate    Pneumonia  Assessment & Plan  Most likely gram-negative  Continue IV antibiotic  Blood culture, urine Legionella, pneumonia  COVID/flu panel negative-patient has recent history of COVID in early January  Follow respiratory protocol      VTE Pharmacologic Prophylaxis: VTE Score: 4 Moderate Risk (Score 3-4) - Pharmacological DVT Prophylaxis Ordered: heparin  Code Status: Level 1 - Full Code as per patient  Discussion with family: Updated  (son) at bedside  Anticipated Length of Stay: Patient will be admitted on an observation basis with an anticipated length of stay of less than 2 midnights secondary to To monitor above condition  Total Time Spent on Date of Encounter in care of patient: 15 minutes This time was spent on one or more of the following: performing physical exam; counseling and coordination of care; obtaining or reviewing history; documenting in the medical record; reviewing/ordering tests, medications or procedures; communicating with other healthcare professionals and discussing with patient's family/caregivers  Chief Complaint: Palpitation, short of breath    History of Present Illness:  Joann Ni is a 68 y o  female with a PMH of coronary artery disease, SVT who presents with palpitation, short of breath  Patient reports recently she diagnosed with bronchiolitis and started antibiotic  But for the past couple of weeks, she feels exhausted, tired and short of breath  Patient lives in assisted facility, reports when she walks down the hallway, end of the hallway she needs to take rest to catch up her bed  Patient also reports she feels winded  Also reports some swelling in the lower extremities  Denies any fever, nausea, vomiting, chest pain  Pulmonary smoker  Social drinker  Review of Systems:  Review of Systems   Constitutional: Positive for activity change and fatigue  Negative for appetite change, chills, diaphoresis, fever and unexpected weight change  HENT: Negative for congestion, dental problem, rhinorrhea, sinus pressure, sinus pain and sneezing  Respiratory: Positive for shortness of breath  Negative for apnea, chest tightness, wheezing and stridor  Cardiovascular: Positive for palpitations and leg swelling  Negative for chest pain  Gastrointestinal: Negative for abdominal distention, abdominal pain, anal bleeding, blood in stool, constipation, nausea and rectal pain  Genitourinary: Negative for decreased urine volume, difficulty urinating, dyspareunia, dysuria, vaginal bleeding and vaginal discharge  Musculoskeletal: Negative for arthralgias and back pain  Skin: Negative for color change, pallor and rash  Neurological: Negative for dizziness, tremors, syncope, facial asymmetry, speech difficulty and weakness  Hematological: Negative for adenopathy  Psychiatric/Behavioral: Negative for agitation, behavioral problems, confusion and decreased concentration  All other systems reviewed and are negative  Past Medical and Surgical History:   Past Medical History:   Diagnosis Date   • Diabetes mellitus ([facility] )    • Hypertension    • SVT (supraventricular tachycardia) ([facility] )        Past Surgical History:   Procedure Laterality Date   • BREAST SURGERY     •  SECTION     • NOSE SURGERY     • TONSILLECTOMY         Meds/Allergies:  Prior to Admission medications    Medication Sig Start Date End Date Taking?  Authorizing Provider   aspirin (ECOTRIN LOW STRENGTH) 81 mg EC tablet Take 81 mg by mouth daily   Yes Historical Provider, MD   benzonatate (TESSALON PERLES) 100 mg capsule Take 100 mg by mouth 3 (three) times a day as needed for cough   Yes Historical Provider, MD   Cholecalciferol 125 MCG (5000 UT) capsule Take 1 tablet by mouth daily   Yes Historical Provider, MD   losartan (COZAAR) 25 mg tablet Take 25 mg by mouth 2 (two) times a day   Yes Historical Provider, MD   Melatonin 5 MG CAPS Take by mouth daily at bedtime   Yes Historical Provider, MD   metFORMIN (GLUCOPHAGE) 500 mg tablet Take 500 mg by mouth daily   Yes Historical Provider, MD   metoprolol tartrate (LOPRESSOR) 25 mg tablet Take 75 mg by mouth 2 (two) times a day   Yes Historical Provider, MD   Multiple Vitamins-Minerals (CENTRUM SILVER PO) Take by mouth in the morning   Yes Historical Provider, MD   rosuvastatin (CRESTOR) 5 mg tablet Take 5 mg by mouth daily Monday, Wednesday, Friday   Yes Historical Provider, MD     I have reviewed home medications with patient personally  Allergies: Allergies   Allergen Reactions   • Lisinopril Cough     ace cough  ace cough     • Naproxen Palpitations   • Pseudoephedrine Palpitations       Social History:  Marital Status:    Occupation: Unknown  Patient Pre-hospital Living Situation: Home  Patient Pre-hospital Level of Mobility: walks  Patient Pre-hospital Diet Restrictions: Cardiac diet  Substance Use History:   Social History     Substance and Sexual Activity   Alcohol Use Not Currently     Social History     Tobacco Use   Smoking Status Former   Smokeless Tobacco Never     Social History     Substance and Sexual Activity   Drug Use Not Currently       Family History:  Noncontributory    Physical Exam:     Vitals:   Blood Pressure: 122/61 (02/28/23 1700)  Pulse: (!) 114 (02/28/23 1700)  Temperature: 97 7 °F (36 5 °C) (02/28/23 1700)  Temp Source: Temporal (02/28/23 1700)  Respirations: 20 (02/28/23 1700)  Height: 5' 4" (162 6 cm) (02/28/23 1457)  Weight - Scale: 86 2 kg (190 lb) (02/28/23 1457)  SpO2: 94 % (02/28/23 1700)    Physical Exam  Vitals and nursing note reviewed  Exam conducted with a chaperone present  Constitutional:       Appearance: Normal appearance  She is obese  She is not ill-appearing or diaphoretic  HENT:      Head: Normocephalic and atraumatic  Nose: Nose normal  No congestion or rhinorrhea  Mouth/Throat:      Mouth: Mucous membranes are moist       Pharynx: Oropharynx is clear  No oropharyngeal exudate or posterior oropharyngeal erythema  Eyes:      General: No scleral icterus  Right eye: No discharge  Left eye: No discharge        Extraocular Movements: Extraocular movements intact  Conjunctiva/sclera: Conjunctivae normal       Pupils: Pupils are equal, round, and reactive to light  Neck:      Vascular: No carotid bruit  Cardiovascular:      Rate and Rhythm: Tachycardia present  Heart sounds: No murmur heard  No friction rub  No gallop  Pulmonary:      Effort: Pulmonary effort is normal  No respiratory distress  Breath sounds: Rales present  No rhonchi  Chest:      Chest wall: No tenderness  Abdominal:      General: Abdomen is flat  Bowel sounds are normal  There is no distension  Palpations: There is no mass  Tenderness: There is no abdominal tenderness  Hernia: No hernia is present  Musculoskeletal:      Cervical back: Normal range of motion  Right lower leg: Edema present  Left lower leg: Edema present  Lymphadenopathy:      Cervical: No cervical adenopathy  Skin:     General: Skin is warm  Coloration: Skin is not jaundiced or pale  Findings: No bruising or erythema  Neurological:      General: No focal deficit present  Mental Status: She is alert and oriented to person, place, and time  Cranial Nerves: No cranial nerve deficit  Sensory: No sensory deficit  Motor: No weakness        Coordination: Coordination normal          Additional Data:     Lab Results:  Results from last 7 days   Lab Units 02/28/23  1053   WBC Thousand/uL 13 00*   HEMOGLOBIN g/dL 14 3   HEMATOCRIT % 43 6   PLATELETS Thousands/uL 351   NEUTROS PCT % 63   LYMPHS PCT % 17   MONOS PCT % 17*   EOS PCT % 1     Results from last 7 days   Lab Units 02/28/23  1053   SODIUM mmol/L 135   POTASSIUM mmol/L 3 8   CHLORIDE mmol/L 100   CO2 mmol/L 25   BUN mg/dL 20   CREATININE mg/dL 0 61   ANION GAP mmol/L 10   CALCIUM mg/dL 10 0   ALBUMIN g/dL 4 1   TOTAL BILIRUBIN mg/dL 0 55   ALK PHOS U/L 63   ALT U/L 14   AST U/L 24   GLUCOSE RANDOM mg/dL 159*     Results from last 7 days   Lab Units 02/28/23  1053   INR  1 02             Results from last 7 days   Lab Units 02/28/23  1053   PROCALCITONIN ng/ml 0 10       Lines/Drains:  Invasive Devices     Peripheral Intravenous Line  Duration           Peripheral IV 02/28/23 Left Antecubital <1 day                    Imaging: Reviewed radiology reports from this admission including: abdominal/pelvic CT  CTA chest pe study   Final Result by Dee Dee Valenzuela MD (02/28 1628)      No pulmonary embolus  Tree-in-bud nodularity in the dependent portion of both upper lobes and both lower lobes with mild bilateral lower lobe consolidation compatible with bronchiolitis / pneumonia  Alternatively, this could be due to aspiration in the appropriate clinical    setting  4 2 cm right thyroid nodule with mass effect on the trachea  Recommend follow-up with nonemergent thyroid ultrasound  The study was [facility] in [facility] for immediate notification and follow-up  Workstation performed: [id number]         XR chest 1 view portable   ED Interpretation by Tanja Lewis DO (02/28 1128)   No Acute cardiopulmonary disease      Final Result by Denisha Aguilar MD (02/28 1312)      Mild pulmonary vascular congestion  Workstation performed: [id number]             EKG and Other Studies Reviewed on Admission:   · EKG: Reviewed  ** Please Note: This note has been constructed using a voice recognition system   **

## 2023-02-28 NOTE — ASSESSMENT & PLAN NOTE
Suspect cardiac in origin-since patient was having orthopnea, leg swelling  Patient had history of cardiac cath in 2016 at [facility]  Now having SVT which can aggravate patient problems  CTA PE negative  Check echocardiogram  Status post 1 L of IV bolus-continue to monitor, consider Lasix if appropriate

## 2023-02-28 NOTE — ASSESSMENT & PLAN NOTE
Could be aggravated by  Sepsis  Patient is status post dose of adenosine, IV Lopressor, p o   Lopressor, Cardizem  On the floor, RRT called on this patient due to above reason  Patient level of care updated  TSH normal, magnesium normal  Potassium 3 8, will keep potassium more than 4  Follow cardiology recommendation, follow repeat echocardiogram

## 2023-03-01 ENCOUNTER — APPOINTMENT (INPATIENT)
Dept: NON INVASIVE DIAGNOSTICS | Facility: HOSPITAL | Age: 78
End: 2023-03-01

## 2023-03-01 PROBLEM — E04.1 THYROID NODULE: Status: ACTIVE | Noted: 2023-03-01

## 2023-03-01 LAB
ALBUMIN SERPL BCP-MCNC: 3.8 G/DL (ref 3.5–5)
ALP SERPL-CCNC: 57 U/L (ref 34–104)
ALT SERPL W P-5'-P-CCNC: 15 U/L (ref 7–52)
ANION GAP SERPL CALCULATED.3IONS-SCNC: 6 MMOL/L (ref 4–13)
AORTIC ROOT: 3.6 CM
AORTIC VALVE MEAN VELOCITY: 8.4 M/S
APICAL FOUR CHAMBER EJECTION FRACTION: 36 %
ASCENDING AORTA: 3.2 CM
AST SERPL W P-5'-P-CCNC: 20 U/L (ref 13–39)
ATRIAL RATE: 100 BPM
ATRIAL RATE: 116 BPM
ATRIAL RATE: 199 BPM
ATRIAL RATE: 202 BPM
ATRIAL RATE: 250 BPM
AV AREA BY CONTINUOUS VTI: 4.9 CM2
AV AREA PEAK VELOCITY: 4.1 CM2
AV LVOT MEAN GRADIENT: 1 MMHG
AV LVOT PEAK GRADIENT: 3 MMHG
AV MEAN GRADIENT: 3 MMHG
AV PEAK GRADIENT: 6 MMHG
AV VALVE AREA: 4.9 CM2
AV VELOCITY RATIO: 0.71
BASOPHILS # BLD AUTO: 0.07 THOUSANDS/ÂΜL (ref 0–0.1)
BASOPHILS NFR BLD AUTO: 1 % (ref 0–1)
BILIRUB SERPL-MCNC: 0.53 MG/DL (ref 0.2–1)
BUN SERPL-MCNC: 14 MG/DL (ref 5–25)
CALCIUM SERPL-MCNC: 8.8 MG/DL (ref 8.4–10.2)
CARDIAC TROPONIN I PNL SERPL HS: 9 NG/L (ref 8–18)
CHLORIDE SERPL-SCNC: 101 MMOL/L (ref 96–108)
CHOLEST SERPL-MCNC: 104 MG/DL
CO2 SERPL-SCNC: 29 MMOL/L (ref 21–32)
CREAT SERPL-MCNC: 0.48 MG/DL (ref 0.6–1.3)
DOP CALC AO PEAK VEL: 1.24 M/S
DOP CALC AO VTI: 25.55 CM
DOP CALC LVOT AREA: 5.72 CM2
DOP CALC LVOT DIAMETER: 2.7 CM
DOP CALC LVOT PEAK VEL VTI: 21.89 CM
DOP CALC LVOT PEAK VEL: 0.88 M/S
DOP CALC LVOT STROKE INDEX: 62.9 ML/M2
DOP CALC LVOT STROKE VOLUME: 125.27
E WAVE DECELERATION TIME: 127 MS
EOSINOPHIL # BLD AUTO: 0.17 THOUSAND/ÂΜL (ref 0–0.61)
EOSINOPHIL NFR BLD AUTO: 2 % (ref 0–6)
ERYTHROCYTE [DISTWIDTH] IN BLOOD BY AUTOMATED COUNT: 11.9 % (ref 11.6–15.1)
FRACTIONAL SHORTENING: 28 (ref 28–44)
GFR SERPL CREATININE-BSD FRML MDRD: 94 ML/MIN/1.73SQ M
GLUCOSE SERPL-MCNC: 115 MG/DL (ref 65–140)
GLUCOSE SERPL-MCNC: 117 MG/DL (ref 65–140)
GLUCOSE SERPL-MCNC: 136 MG/DL (ref 65–140)
GLUCOSE SERPL-MCNC: 140 MG/DL (ref 65–140)
GLUCOSE SERPL-MCNC: 152 MG/DL (ref 65–140)
HCT VFR BLD AUTO: 38.8 % (ref 34.8–46.1)
HDLC SERPL-MCNC: 42 MG/DL
HGB BLD-MCNC: 12.7 G/DL (ref 11.5–15.4)
IMM GRANULOCYTES # BLD AUTO: 0.08 THOUSAND/UL (ref 0–0.2)
IMM GRANULOCYTES NFR BLD AUTO: 1 % (ref 0–2)
INTERVENTRICULAR SEPTUM IN DIASTOLE (PARASTERNAL SHORT AXIS VIEW): 1.1 CM
INTERVENTRICULAR SEPTUM: 1.1 CM (ref 0.6–1.1)
L PNEUMO1 AG UR QL IA.RAPID: NEGATIVE
LAAS-AP2: 25.3 CM2
LAAS-AP4: 15 CM2
LDLC SERPL CALC-MCNC: 43 MG/DL (ref 0–100)
LEFT ATRIUM SIZE: 3.9 CM
LEFT INTERNAL DIMENSION IN SYSTOLE: 3.1 CM (ref 2.1–4)
LEFT VENTRICLE DIASTOLIC VOLUME (MOD BIPLANE): 86 ML
LEFT VENTRICLE SYSTOLIC VOLUME (MOD BIPLANE): 58 ML
LEFT VENTRICULAR INTERNAL DIMENSION IN DIASTOLE: 4.3 CM (ref 3.5–6)
LEFT VENTRICULAR POSTERIOR WALL IN END DIASTOLE: 1.2 CM
LEFT VENTRICULAR STROKE VOLUME: 42 ML
LV EF: 33 %
LVSV (TEICH): 42 ML
LYMPHOCYTES # BLD AUTO: 2.08 THOUSANDS/ÂΜL (ref 0.6–4.47)
LYMPHOCYTES NFR BLD AUTO: 20 % (ref 14–44)
MAGNESIUM SERPL-MCNC: 2.8 MG/DL (ref 1.9–2.7)
MCH RBC QN AUTO: 32.1 PG (ref 26.8–34.3)
MCHC RBC AUTO-ENTMCNC: 32.7 G/DL (ref 31.4–37.4)
MCV RBC AUTO: 98 FL (ref 82–98)
MONOCYTES # BLD AUTO: 1.7 THOUSAND/ÂΜL (ref 0.17–1.22)
MONOCYTES NFR BLD AUTO: 16 % (ref 4–12)
MV E'TISSUE VEL-LAT: 10 CM/S
MV E'TISSUE VEL-SEP: 6 CM/S
MV PEAK A VEL: 0.9 M/S
MV PEAK E VEL: 61 CM/S
MV STENOSIS PRESSURE HALF TIME: 37 MS
MV VALVE AREA P 1/2 METHOD: 5.95
NEUTROPHILS # BLD AUTO: 6.36 THOUSANDS/ÂΜL (ref 1.85–7.62)
NEUTS SEG NFR BLD AUTO: 60 % (ref 43–75)
NONHDLC SERPL-MCNC: 62 MG/DL
NRBC BLD AUTO-RTO: 0 /100 WBCS
P AXIS: 51 DEGREES
P AXIS: 64 DEGREES
PHOSPHATE SERPL-MCNC: 2.4 MG/DL (ref 2.3–4.1)
PLATELET # BLD AUTO: 318 THOUSANDS/UL (ref 149–390)
PMV BLD AUTO: 9.4 FL (ref 8.9–12.7)
POTASSIUM SERPL-SCNC: 4 MMOL/L (ref 3.5–5.3)
PR INTERVAL: 146 MS
PR INTERVAL: 180 MS
PROCALCITONIN SERPL-MCNC: 0.13 NG/ML
PROT SERPL-MCNC: 7.1 G/DL (ref 6.4–8.4)
QRS AXIS: 10 DEGREES
QRS AXIS: 14 DEGREES
QRS AXIS: 21 DEGREES
QRS AXIS: 22 DEGREES
QRS AXIS: 5 DEGREES
QRSD INTERVAL: 80 MS
QRSD INTERVAL: 86 MS
QRSD INTERVAL: 86 MS
QRSD INTERVAL: 88 MS
QRSD INTERVAL: 88 MS
QT INTERVAL: 226 MS
QT INTERVAL: 242 MS
QT INTERVAL: 260 MS
QT INTERVAL: 306 MS
QT INTERVAL: 350 MS
QTC INTERVAL: 411 MS
QTC INTERVAL: 425 MS
QTC INTERVAL: 436 MS
QTC INTERVAL: 451 MS
QTC INTERVAL: 458 MS
RA PRESSURE ESTIMATED: 8 MMHG
RBC # BLD AUTO: 3.96 MILLION/UL (ref 3.81–5.12)
RIGHT VENTRICLE ID DIMENSION: 3 CM
RV PSP: 36 MMHG
S PNEUM AG UR QL: NEGATIVE
SL CV LEFT ATRIUM LENGTH A2C: 5.2 CM
SL CV LV EF: 50
SL CV PED ECHO LEFT VENTRICLE DIASTOLIC VOLUME (MOD BIPLANE) 2D: 82 ML
SL CV PED ECHO LEFT VENTRICLE SYSTOLIC VOLUME (MOD BIPLANE) 2D: 39 ML
SODIUM SERPL-SCNC: 136 MMOL/L (ref 135–147)
T WAVE AXIS: 16 DEGREES
T WAVE AXIS: 212 DEGREES
T WAVE AXIS: 234 DEGREES
T WAVE AXIS: 243 DEGREES
T WAVE AXIS: 46 DEGREES
TR MAX PG: 28 MMHG
TR PEAK VELOCITY: 2.6 M/S
TRICUSPID ANNULAR PLANE SYSTOLIC EXCURSION: 2.9 CM
TRICUSPID VALVE PEAK REGURGITATION VELOCITY: 2.63 M/S
TRIGL SERPL-MCNC: 96 MG/DL
VENTRICULAR RATE: 100 BPM
VENTRICULAR RATE: 116 BPM
VENTRICULAR RATE: 187 BPM
VENTRICULAR RATE: 195 BPM
VENTRICULAR RATE: 199 BPM
WBC # BLD AUTO: 10.46 THOUSAND/UL (ref 4.31–10.16)

## 2023-03-01 RX ORDER — POTASSIUM CHLORIDE 14.9 MG/ML
20 INJECTION INTRAVENOUS ONCE
Status: COMPLETED | OUTPATIENT
Start: 2023-03-01 | End: 2023-03-01

## 2023-03-01 RX ORDER — METOPROLOL TARTRATE 5 MG/5ML
5 INJECTION INTRAVENOUS EVERY 6 HOURS PRN
Status: DISCONTINUED | OUTPATIENT
Start: 2023-03-01 | End: 2023-03-02 | Stop reason: HOSPADM

## 2023-03-01 RX ORDER — PHENYLEPHRINE HYDROCHLORIDE 10 MG/ML
INJECTION INTRAVENOUS
Status: DISPENSED
Start: 2023-03-01 | End: 2023-03-01

## 2023-03-01 RX ORDER — METOPROLOL TARTRATE 5 MG/5ML
5 INJECTION INTRAVENOUS ONCE
Status: COMPLETED | OUTPATIENT
Start: 2023-03-01 | End: 2023-03-01

## 2023-03-01 RX ORDER — MAGNESIUM SULFATE HEPTAHYDRATE 40 MG/ML
INJECTION, SOLUTION INTRAVENOUS
Status: COMPLETED
Start: 2023-03-01 | End: 2023-03-01

## 2023-03-01 RX ORDER — MIDAZOLAM HYDROCHLORIDE 2 MG/2ML
INJECTION, SOLUTION INTRAMUSCULAR; INTRAVENOUS
Status: DISPENSED
Start: 2023-03-01 | End: 2023-03-01

## 2023-03-01 RX ORDER — MAGNESIUM SULFATE HEPTAHYDRATE 40 MG/ML
2 INJECTION, SOLUTION INTRAVENOUS ONCE
Status: COMPLETED | OUTPATIENT
Start: 2023-03-01 | End: 2023-03-01

## 2023-03-01 RX ORDER — GUAIFENESIN 600 MG/1
600 TABLET, EXTENDED RELEASE ORAL EVERY 12 HOURS SCHEDULED
Status: DISCONTINUED | OUTPATIENT
Start: 2023-03-01 | End: 2023-03-02 | Stop reason: HOSPADM

## 2023-03-01 RX ORDER — ADENOSINE 3 MG/ML
6 INJECTION INTRAVENOUS ONCE
Status: COMPLETED | OUTPATIENT
Start: 2023-03-01 | End: 2023-03-01

## 2023-03-01 RX ORDER — VERAPAMIL HYDROCHLORIDE 40 MG/1
40 TABLET ORAL ONCE
Status: COMPLETED | OUTPATIENT
Start: 2023-03-01 | End: 2023-03-01

## 2023-03-01 RX ORDER — VERAPAMIL HYDROCHLORIDE 120 MG/1
TABLET, FILM COATED ORAL
Status: COMPLETED
Start: 2023-03-01 | End: 2023-03-01

## 2023-03-01 RX ORDER — VERAPAMIL HYDROCHLORIDE 80 MG/1
80 TABLET ORAL EVERY 8 HOURS SCHEDULED
Status: DISCONTINUED | OUTPATIENT
Start: 2023-03-01 | End: 2023-03-02 | Stop reason: HOSPADM

## 2023-03-01 RX ADMIN — VERAPAMIL HYDROCHLORIDE 80 MG: 80 TABLET ORAL at 21:32

## 2023-03-01 RX ADMIN — ENOXAPARIN SODIUM 40 MG: 40 INJECTION SUBCUTANEOUS at 08:25

## 2023-03-01 RX ADMIN — ASPIRIN 81 MG: 81 TABLET, COATED ORAL at 08:25

## 2023-03-01 RX ADMIN — VERAPAMIL HYDROCHLORIDE 40 MG: 40 TABLET, FILM COATED ORAL at 01:10

## 2023-03-01 RX ADMIN — BENZONATATE 100 MG: 100 CAPSULE ORAL at 20:44

## 2023-03-01 RX ADMIN — METOPROLOL TARTRATE 5 MG: 5 INJECTION INTRAVENOUS at 05:39

## 2023-03-01 RX ADMIN — VERAPAMIL HYDROCHLORIDE 80 MG: 80 TABLET ORAL at 13:17

## 2023-03-01 RX ADMIN — BENZONATATE 100 MG: 100 CAPSULE ORAL at 13:17

## 2023-03-01 RX ADMIN — LEVALBUTEROL HYDROCHLORIDE 1.25 MG: 1.25 SOLUTION RESPIRATORY (INHALATION) at 20:04

## 2023-03-01 RX ADMIN — VERAPAMIL HYDROCHLORIDE 80 MG: 120 TABLET, FILM COATED ORAL at 05:26

## 2023-03-01 RX ADMIN — BENZONATATE 100 MG: 100 CAPSULE ORAL at 04:53

## 2023-03-01 RX ADMIN — VERAPAMIL HYDROCHLORIDE 80 MG: 80 TABLET ORAL at 05:26

## 2023-03-01 RX ADMIN — MAGNESIUM SULFATE HEPTAHYDRATE 2 G: 40 INJECTION, SOLUTION INTRAVENOUS at 01:10

## 2023-03-01 RX ADMIN — PERFLUTREN 2 ML/MIN: 6.52 INJECTION, SUSPENSION INTRAVENOUS at 08:06

## 2023-03-01 RX ADMIN — POTASSIUM CHLORIDE 20 MEQ: 14.9 INJECTION, SOLUTION INTRAVENOUS at 06:49

## 2023-03-01 RX ADMIN — METOROPROLOL TARTRATE 5 MG: 5 INJECTION, SOLUTION INTRAVENOUS at 20:44

## 2023-03-01 RX ADMIN — ATORVASTATIN CALCIUM 40 MG: 40 TABLET, FILM COATED ORAL at 16:58

## 2023-03-01 RX ADMIN — CEFTRIAXONE 1000 MG: 1 INJECTION, SOLUTION INTRAVENOUS at 17:02

## 2023-03-01 RX ADMIN — INSULIN LISPRO 1 UNITS: 100 INJECTION, SOLUTION INTRAVENOUS; SUBCUTANEOUS at 08:25

## 2023-03-01 RX ADMIN — GUAIFENESIN 600 MG: 600 TABLET ORAL at 20:44

## 2023-03-01 RX ADMIN — GUAIFENESIN 600 MG: 600 TABLET ORAL at 10:56

## 2023-03-01 RX ADMIN — ADENOSINE 6 MG: 3 INJECTION, SOLUTION INTRAVENOUS at 05:03

## 2023-03-01 NOTE — CONSULTS
Consult Cardiology    Bianka Hahn 1945, 68 y o  female MRN: [id number]    Unit/Bed#: -01 Encounter: [id number]    Attending Provider: Caryle Must, MD   Primary Care Provider: Fady Valenzuela MD   Date admitted to hospital: 2/28/2023       Inpatient consult to Cardiology  Consult performed by: VIVI Morales  Consult ordered by: Mayda Garner MD          SVT (supraventricular tachycardia) [facility])  Assessment & Plan  History of paroxysmal SVT dating back to 2016  Has been on metoprolol tartrate managed by [address] [place] [address] Cardiology  Presents with increasing frequency of SVT episodes  Demonstrates episodes of SVT/AT with rates > 200 during this admission with temporary response to IV adenosine and IV Lopressor  Did not respond to IV Cardiazem  HR's improved to low 100's with verapamil 80 mg TID  Will continue verapamil at this time with consideration for transfer to [facility] for EP evaluation  Continue telemetry  Optimize electrolytes for K+ > 4, Mag > 2  Thyroid nodule  Assessment & Plan  TSH 0 9 during this admission    Sleep apnea  Assessment & Plan  Continue use of CPAP    Dyspnea on exertion  Assessment & Plan  Likely multi-factoral in the setting of pneumonia and atrial tachycardia  See discussion under SVT and pneumonia  Pneumonia  Assessment & Plan  Noted on chest imaging  Recent Covid infection 1/5/2023  Management as per critical care team    Likely contributing to rapid rates  * Sepsis [facility])  Assessment & Plan  Secondary to pneumonia  Management as per ICU team         Physician Requesting Consult: Caryle Must, MD    Reason for Consult / Principal Problem: SVT      HPI: Bianka Hahn is a 68y o  year old female who has a history of SVT, T2DM, HTN, HLD, ABBY on CPAP  who follows with cardiologist Dr Mariely Weaver with [address] [place] [address] Cardiology  Has previously undergone cardiac work up with cardiac catheterization in 2016 which did not show significant CAD  detailed exam Last echocardiogram in 2015 and stress echo in 2019  Patient presented to [address] [place] [address] ER 2/28/2023 with concern for increasing frequency of SVT episodes  Presenting ECG showed SVT at rate 216 bpm  HS troponin neg x 3    Mild leukocytosis  TSH 0 9  Procalcitonin 0 10  Chest xray and CTA show likely bilateral lower lobe pneumonia  Negative for PE  She was given 3 doses of IV adenosine 6 mg, 4 doses of IV Lopressor 5 mg, IV Cardiazem 20 mg, oral metoprolol tartrate 25 mg, and oral metoprolol succinate 25 mg with recurrent episodes of rapid rates of both narrow complex and wide complex tachycardia  At the time of my assessment she is resting comfortably in bed  Denies any current complaints other than cough  Currently on 2 L NC  Does not require supplemental O2 at home  Reports faithful CPAP use prior to her Covid infection, more recently she admits use was sporadic due to her URI symptoms  She tells me that since around November 2022 she has noticed heart rates staying > 90 bpm  She has experienced associated increased dyspnea on exertion  She reports testing positive for Covid infection 1/5/2023  Since that time she has experienced more frequent episodes of SVT with faster rates  She has been taking metoprolol tartrate 25 mg BID for years  Denies taking extra doses  She denies chest pain or lightheadedness  Her symptoms with SVT is a flushing sensation in her mid to low back associated with a feeling of anxiety  She denies syncope or near syncope  She quit smoking in 1989  No known lung disease  Denies ETOH or substance use  Review of Systems   Constitutional: Positive for fatigue  Negative for chills and fever  HENT: Negative for ear pain and sore throat  Eyes: Negative for pain and visual disturbance  Respiratory: Positive for cough and shortness of breath  Cardiovascular: Positive for leg swelling (sporadic)  Negative for chest pain and palpitations     Gastrointestinal: Negative for detailed exam abdominal pain and vomiting  Genitourinary: Negative for dysuria and hematuria  Musculoskeletal: Negative for arthralgias and back pain  Skin: Negative for color change and rash  Neurological: Negative for seizures and syncope  All other systems reviewed and are negative         Historical Information     Past Medical History:   Diagnosis Date   • Diabetes mellitus ([address] )    • Hyperlipidemia    • Hypertension    • ABBY (obstructive sleep apnea)     on CPAP   • SVT (supraventricular tachycardia) (HCC)      Past Surgical History:   Procedure Laterality Date   • BREAST SURGERY     •  SECTION     • NOSE SURGERY     • TONSILLECTOMY       Social History     Substance and Sexual Activity   Alcohol Use Not Currently     Social History     Substance and Sexual Activity   Drug Use Never     Social History     Tobacco Use   Smoking Status Former   • Types: Cigarettes   • Quit date: 1989   • Years since quittin 1   Smokeless Tobacco Never       Family History: non-contributory    Meds/Allergies     current meds:   Current Facility-Administered Medications   Medication Dose Route Frequency   • acetaminophen (TYLENOL) tablet 650 mg  650 mg Oral Q6H PRN   • aspirin (ECOTRIN LOW STRENGTH) EC tablet 81 mg  81 mg Oral Daily   • atorvastatin (LIPITOR) tablet 40 mg  40 mg Oral Daily With Dinner   • benzonatate (TESSALON PERLES) capsule 100 mg  100 mg Oral TID PRN   • cefTRIAXone (ROCEPHIN) IVPB (premix in dextrose) 1,000 mg 50 mL  1,000 mg Intravenous Q24H   • docusate sodium (COLACE) capsule 100 mg  100 mg Oral BID   • enoxaparin (LOVENOX) subcutaneous injection 40 mg  40 mg Subcutaneous Daily   • guaiFENesin (MUCINEX) 12 hr tablet 600 mg  600 mg Oral Q12H XAVIER   • insulin lispro (HumaLOG) 100 units/mL subcutaneous injection 1-6 Units  1-6 Units Subcutaneous TID AC   • insulin lispro (HumaLOG) 100 units/mL subcutaneous injection 1-6 Units  1-6 Units Subcutaneous HS   • levalbuterol (XOPENEX) inhalation solution 1 25 mg  1 25 mg Nebulization Q4H PRN   • midazolam (VERSED) 2 mg/2 mL injection **ADS Override Pull**       • ondansetron (ZOFRAN) injection 4 mg  4 mg Intravenous Q6H PRN   • phenylephrine (ANDREW-SYNEPHRINE) 50 mg (STANDARD CONCENTRATION) in sodium chloride 0 9% 250 mL   mcg/min Intravenous Titrated   • phenylephrine HCl (VAZCULEP) 10 MG/ML solution **ADS Override Pull**       • phenylephrine HCl (VAZCULEP) 10 MG/ML solution **ADS Override Pull**       • phenylephrine HCl (VAZCULEP) 10 MG/ML solution **ADS Override Pull**       • verapamil (CALAN) tablet 80 mg  80 mg Oral Q8H XAVIER     phenylephine,  mcg/min        Allergies   Allergen Reactions   • Lisinopril Cough     ace cough  ace cough     • Naproxen Palpitations   • Pseudoephedrine Palpitations       Objective     Vitals: Blood pressure 107/54, pulse (!) 116, temperature 98 1 °F (36 7 °C), temperature source Oral, resp  rate 20, height 5' 4" (1 626 m), weight 89 4 kg (197 lb), SpO2 95 %  , Body mass index is 33 81 kg/m²  Orthostatic Blood Pressures    Flowsheet Row Most Recent Value   Blood Pressure 107/54 filed at 03/01/2023 1100   Patient Position - Orthostatic VS Sitting filed at 03/01/2023 8973          Systolic (70FYX), SJI:293 , Min:85 , WTL:449     Diastolic (56ESY), RKH:92, Min:51, Max:99        Intake/Output Summary (Last 24 hours) at 3/1/2023 1304  Last data filed at 3/1/2023 1201  Gross per 24 hour   Intake 901 67 ml   Output 800 ml   Net 101 67 ml       Weight (last 2 days)     Date/Time Weight    03/01/23 0700 89 4 (197)    03/01/23 0453 89 7 (197 75)    02/28/23 1457 86 2 (190)    02/28/23 1045 85 7 (189)          Invasive Devices     Peripheral Intravenous Line  Duration           Peripheral IV 02/28/23 Dorsal (posterior); Right Hand <1 day    Peripheral IV 02/28/23 Left Antecubital <1 day    Peripheral IV 02/28/23 Right Wrist <1 day          Drain  Duration           External Urinary Catheter <1 day                Physical Exam  Vitals and nursing note reviewed  Constitutional:       General: She is not in acute distress  Appearance: She is well-developed  She is obese  HENT:      Head: Normocephalic and atraumatic  Eyes:      Conjunctiva/sclera: Conjunctivae normal    Cardiovascular:      Rate and Rhythm: Regular rhythm  Tachycardia present  Heart sounds: No murmur heard  Pulmonary:      Effort: Pulmonary effort is normal  No respiratory distress  Breath sounds: Decreased breath sounds present  Comments: 2 L NC  Abdominal:      Palpations: Abdomen is soft  Tenderness: There is no abdominal tenderness  Musculoskeletal:         General: No swelling  Cervical back: Neck supple  Right lower leg: No edema  Left lower leg: No edema  Skin:     General: Skin is warm and dry  Capillary Refill: Capillary refill takes less than 2 seconds  Neurological:      Mental Status: She is alert  Psychiatric:         Mood and Affect: Mood and affect normal          Speech: Speech normal          Behavior: Behavior normal  Behavior is cooperative           Cognition and Memory: Cognition and memory normal               Laboratory Results:          CBC with diff:   Results from last 7 days   Lab Units 03/01/23 0513 02/28/23  1053   WBC Thousand/uL 10 46* 13 00*   HEMOGLOBIN g/dL 12 7 14 3   HEMATOCRIT % 38 8 43 6   MCV fL 98 98   PLATELETS Thousands/uL 318 351   MCH pg 32 1 32 0   MCHC g/dL 32 7 32 8   RDW % 11 9 11 9   MPV fL 9 4 9 5   NRBC AUTO /100 WBCs 0 0         CMP:  Results from last 7 days   Lab Units 03/01/23 0513 02/28/23 2050 02/28/23  1053   POTASSIUM mmol/L 4 0 3 6 3 8   CHLORIDE mmol/L 101 101 100   CO2 mmol/L 29 27 25   BUN mg/dL 14 20 20   CREATININE mg/dL 0 48* 0 59* 0 61   CALCIUM mg/dL 8 8 9 0 10 0   AST U/L 20  --  24   ALT U/L 15  --  14   ALK PHOS U/L 57  --  63   EGFR ml/min/1 73sq m 94 88 87       BMP:  Results from last 7 days   Lab Units 03/01/23 0513 02/28/23 2050 detailed exam 02/28/23  1053   POTASSIUM mmol/L 4 0 3 6 3 8   CHLORIDE mmol/L 101 101 100   CO2 mmol/L 29 27 25   BUN mg/dL 14 20 20   CREATININE mg/dL 0 48* 0 59* 0 61   CALCIUM mg/dL 8 8 9 0 10 0       BNP:    Recent Labs     02/28/23  1053   *     HS troponin 8->8->10->9    Magnesium:   Results from last 7 days   Lab Units 03/01/23  0513 02/28/23 2050 02/28/23  1053   MAGNESIUM mg/dL 2 8* 1 8* 2 0       Coags:   Results from last 7 days   Lab Units 02/28/23  1053   PTT seconds 29   INR  1 02       TSH:       Hemoglobin A1C:   Results from last 7 days   Lab Units 02/28/23  1809   HEMOGLOBIN A1C % 6 3*       Lipid Profile:   Lab Results   Component Value Date    CHOLESTEROL 104 03/01/2023     Lab Results   Component Value Date    HDL 42 (L) 03/01/2023     Lab Results   Component Value Date    TRIG 96 03/01/2023     Lab Results   Component Value Date    [place] 62 03/01/2023        Cardiac testing:     Reviewed outside records from [facility]  Imaging: I have personally reviewed pertinent reports  XR chest 1 view portable    Result Date: 2/28/2023  Narrative: CHEST INDICATION:   Shortness of breath  COMPARISON:  Chest radiograph 1/8/2023  EXAM PERFORMED/VIEWS:  XR CHEST PORTABLE FINDINGS: Limited evaluation of the lung apices due to obscuration by the patient's neck  Cardiomediastinal silhouette appears unremarkable  Mild pulmonary vascular congestion  No pneumothorax or pleural effusion  Osseous structures appear within normal limits for patient age  Impression: Mild pulmonary vascular congestion  Workstation performed: [id number]     CTA chest pe study    Result Date: 2/28/2023  Narrative: CTA - CHEST WITH IV CONTRAST - PULMONARY ANGIOGRAM INDICATION:   elevated DD, SVT  Per my review of the medical record, the patient presented to the emergency department with cough and palpitations  COMPARISON: CXR 2/28/2023 and chest CT 04/08/2021   TECHNIQUE: CT angiogram timed for optimal opacification of the detailed exam detailed exam detailed exam detailed exam detailed exam pulmonary arteries  Axial, sagittal, and coronal 2D reformats created from source data  Coronal 3D MIP postprocessing on the acquisition scanner  Radiation dose length product (DLP):  491 mGy-cm   Radiation dose exposure minimized using iterative reconstruction and automated exposure control  IV Contrast:  85 mL of iohexol (OMNIPAQUE)  FINDINGS: PULMONARY ARTERIES:  No pulmonary embolus  LUNGS:  Mild bilateral lower lobe consolidation and tree-in-bud nodularity  Mild tree-in-bud nodularity in the dependent portion of both upper lobes  Mild emphysema  AIRWAYS: No significant filling defects  PLEURA:  Unremarkable  HEART/GREAT VESSELS:  Mild cardiomegaly  Mild coronary artery calcification indicating atherosclerotic heart disease  MEDIASTINUM AND CHRISTINE:  Unremarkable  CHEST WALL AND LOWER NECK: 4 2 cm right thyroid nodule with mass effect on the trachea  UPPER ABDOMEN:  Benign calcification in the dome of the liver  OSSEOUS STRUCTURES:  Moderate degenerative disease in the spine  Mild chronic compression deformity at T7  Impression: No pulmonary embolus  Tree-in-bud nodularity in the dependent portion of both upper lobes and both lower lobes with mild bilateral lower lobe consolidation compatible with bronchiolitis / pneumonia  Alternatively, this could be due to aspiration in the appropriate clinical setting  4 2 cm right thyroid nodule with mass effect on the trachea  Recommend follow-up with nonemergent thyroid ultrasound  The study was marked in [facility] for immediate notification and follow-up  Workstation performed: [id number]     Echo complete w/ contrast if indicated    Result Date: 3/1/2023  Narrative: •  Left Ventricle: Left ventricular cavity size is normal  Wall thickness is normal  The left ventricular ejection fraction is 50%  Systolic function is low normal  Wall motion is normal with the exception of abnormal septal motion   Diastolic function is mildly abnormal, consistent with grade I (abnormal) relaxation  Left atrial filling pressure is normal  •  IVS: There is abnormal septal motion of unclear etiology  •  Left Atrium: The atrium is dilated  •  Mitral Valve: There is mild calcification of the mitral leaflets  The leaflets exhibit normal mobility  There is annular calcification  There is trace to mild regurgitation  There is no evidence of stenosis  •  Tricuspid Valve: There is mild regurgitation  There is no evidence of stenosis  The right ventricular systolic pressure is mildly elevated  The estimated right ventricular systolic pressure is 27 89 mmHg  •  Aorta: The aortic root is upper normal in size  The ascending aorta is normal in size  The aortic root is 3 60 cm  The ascending aorta is 3 2 cm  •  Prior TTE study available for comparison  Prior study date: 3/31/2015  Changes noted when compared to prior study  Changes include: RVSP on prior study was 26 mmHg compared to 36 mmHg on current study  Valve regurgitation is unchanged  EF 50-55%  Prior stress echocardiogram 2019 showed EF of 50%          EKG reviewed personally: EKG: SVT vs AT  Telemetry: ST vs AT, rate 100-110, episodes of narrow complex and wide complex tachycardia      Counseling / Coordination of Care  Total floor / unit time spent today 45 minutes  Greater than 50% of total time was spent with the patient and / or family counseling and / or coordination of care    A description of the counseling / coordination of care: Discussed case with critical care team         Code Status: Level 1 - Full Code detailed exam detailed exam detailed exam

## 2023-03-01 NOTE — ASSESSMENT & PLAN NOTE
Patient feels volume up   Echocardiogram is pending   Patient had a cardiac cath in 2016 which was negative, he also had a stress test in 2016 and 2019, patient SVT history is since 2016     BNP is elevated at 202  Liter of IV fluid bolus and 250 mls albumin   CTA of the chest was negative for PE, pneumonia postive   Most likely mild volume overload with bilateral lower extremity swelling, orthopnea dyspnea on exertion

## 2023-03-01 NOTE — ASSESSMENT & PLAN NOTE
Pt was treated in the ER for SVT with adenosine,  IV and PO metoprolol and Cardizem,no response to Cardizem   · Patient had a rapid response called on her during the day and was transferred to level 2 SD was given metoprolol p o  25 mg and IV Lopressor  · TSH is normal, potassium was 3 8 and magnesium was 2  · After repeated labs at 830 pm -" the patient's bedside for recurrent SVT at 920 PM, patient was placed on the monitor with pads  · See if another 5 mg lopressor, 6 mg of adenosine, no good response-2 mg of IV magnesium 40 mg of IV potassium  · Patient was switched verapamil 5 mg IV and 40 mg PO q 8 hours   · Heart rate is better controlled  · Cardiology consult  · Patient may need EP consult for evaluation, he has been going into SVT frequently since 2016    · Increase verapamil to 80 mg PO q 8 hours

## 2023-03-01 NOTE — ASSESSMENT & PLAN NOTE
Likely multi-factoral in the setting of pneumonia and atrial tachycardia  See discussion under SVT and pneumonia

## 2023-03-01 NOTE — UTILIZATION REVIEW
Initial Clinical Review    Admission: Date/Time/Statement:   Admission Orders (From admission, onward)     Ordered        02/28/23 1339  INPATIENT ADMISSION  Once                      Orders Placed This Encounter   Procedures   • INPATIENT ADMISSION     Standing Status:   Standing     Number of Occurrences:   1     Order Specific Question:   Level of Care     Answer:   Med Surg [16]     Order Specific Question:   Estimated length of stay     Answer:   More than 2 Midnights     Order Specific Question:   Certification     Answer:   I certify that inpatient services are medically necessary for this patient for a duration of greater than two midnights  See H&P and MD Progress Notes for additional information about the patient's course of treatment  ED Arrival Information     Expected   2/28/2023 10:38    Arrival   2/28/2023 10:38    Acuity   Emergent            Means of arrival   Ambulance    Escorted by   Daija Curran    Admission type   Emergency            Arrival complaint   rapid heart rate            Chief Complaint   Patient presents with   • Rapid Heart Rate     Seen at pcp and did nothing for her the other day  EMS reports sx been going for about a week  Initial Presentation: 68 y o  female to ED via EMS   Present with a PMHX of coronary artery disease  DM; HTN, SVT who presents with palpitation, short of breath  Patient reports recently she diagnosed with bronchiolitis and started antibiotic  But for the past couple of weeks, she feels exhausted, tired and short of breath  Patient lives in assisted facility, reports when she walks down the hallway, end of the hallway she needs to take rest to catch up her bed  Patient also reports she feels winded  Also reports some swelling in the lower extremities  PMHX    Admitted to ICU  with DX: Sepsis  on exam: obese; orthopnea RR 27-43; tachy 111-200 sustained; rales; 90% ra;  B/L LE Edema; WBC 13,00; gluc 159; K 3 8;   CTA = PE negative for PE, but patient has bilateral lower lobe consolidation, suspect bronchiolitis/pneumonia  CXR = pulmonary vascular congestion  Given in ED: IVF Bolus 1L; cardizem iv; anenocard iv; lopressor iv   PLAN:cont iv abx; monitor labs; cont CPAP; f/u BC / urine cx; f/u exho; albumin iv x1: Adenocard iv x2; Mg sulf iv; verapamil iv x1; trend fever; Cardiology consult     ON Floor: Rapid response called - Acute change in heart rate - SVT >200; Plan: lopressor iv x2; EKG (when HR slowed to 110's = sinus tach    Date: 3/1/23  Day 2  Still cough with sputum production; coarse breath sounds; rhonchi, wheezing; B/L LE Edema; Tachy [id number]; Tachypnic RR 23-34; WBC 10 46; Mg 1 8    Started again with higher HR to the 160 pt appears to have incidents of R on T ?   - gave an extra dose of verapamil 40 increased q 8 hours to 80 mg PO   - gave another 2 grams of magnesium     am episode of -200 adnesosine to break it gave verapamil 80 mg PO early   - received a dose of lopressor 5 mg IV x 1     Plan: srart phenylephine,  mcg/min, gtt - titrate; cont iv abx; monitor labs; cont CPAP; f/u BC / urine cx; f/u exho    CARDIOLOGY CONSULT: SVT  History of paroxysmal SVT dating back to 2016  Has been on metoprolol tartrate managed by [address] [place] [address] Cardiology  Presents with increasing frequency of SVT episodes  Demonstrates episodes of SVT/AT with rates > 200 during this admission with temporary response to IV adenosine and IV Lopressor  Did not respond to IV Cardiazem  HR's improved to low 100's with verapamil 80 mg TID  Plan: Will continue verapamil at this time with consideration for transfer to [facility] for EP evaluation  Continue telemetry        ED Triage Vitals [02/28/23 1045]   Temperature Pulse Respirations Blood Pressure SpO2   (!) 96 8 °F (36 °C) (!) 170 18 154/92 90 %      Temp Source Heart Rate Source Patient Position - Orthostatic VS BP Location FiO2 (%)   Temporal Monitor Sitting Right arm --      Pain Score       No Pain          Wt Readings from Last 1 Encounters:   03/01/23 89 4 kg (197 lb)     Additional Vital Signs:  Date/Time Temp Pulse Resp BP MAP (mmHg) SpO2 Calculated FIO2 (%) - Nasal Cannula Nasal Cannula O2 Flow Rate (L/min) O2 Device Patient Position - Orthostatic VS   03/01/23 0900 -- 102 20 142/68 96 94 % -- -- -- --   03/01/23 0800 -- 99 20 126/58 84 96 % 32 3 L/min Nasal cannula Sitting   03/01/23 0700 -- 97 34 Abnormal  110/55 79 95 % -- -- -- --   03/01/23 0656 99 1 °F (37 3 °C) 96 41 Abnormal  106/69 82 94 % -- -- -- --   03/01/23 0600 -- 95 23 Abnormal  103/66 79 95 % -- -- -- --   03/01/23 0544 -- 95 23 Abnormal  117/57 81 96 % -- -- -- --   03/01/23 0539 -- 128 Abnormal   27 Abnormal  89/52 Abnormal  63 Abnormal  97 % -- -- -- --   Pulse: IVP Lopressor given at 03/01/23 0539   03/01/23 0530 -- 199 Abnormal   24 Abnormal  115/57 -- 95 % -- -- -- --   03/01/23 0524 -- 122 Abnormal  24 Abnormal  115/57 79 96 % -- -- -- --   03/01/23 0500 -- 194 Abnormal   31 Abnormal  105/60 76 95 % -- -- -- --   03/01/23 0430 99 6 °F (37 6 °C) 118 Abnormal  23 Abnormal  -- -- 95 % -- -- -- --   03/01/23 0400 99 6 °F (37 6 °C) 96 26 Abnormal  100/54 73 95 % 32 3 L/min Nasal cannula Lying   03/01/23 0321 98 9 °F (37 2 °C) -- -- -- -- -- -- -- -- --   03/01/23 0300 -- 96 23 Abnormal  99/52 72 95 % -- -- -- --   03/01/23 0200 -- 98 24 Abnormal  110/58 79 96 % -- -- -- --   03/01/23 0100 -- 100 28 Abnormal  104/52 75 95 % -- -- -- --   03/01/23 0046 -- 114 Abnormal  33 Abnormal  -- -- 95 % -- -- -- --   03/01/23 0045 -- 111 Abnormal  24 Abnormal  104/54 75 95 % -- -- -- --   03/01/23 0030 -- 111 Abnormal  23 Abnormal  102/55 75 95 % -- -- -- --   03/01/23 0015 -- 99 20 107/51 73 95 % -- -- -- --   03/01/23 0000 98 8 °F (37 1 °C) 102 37 Abnormal  116/55 76 95 % 32 3 L/min Nasal cannula Lying   02/28/23 2345 -- 95 23 Abnormal  110/56 80 96 % -- -- -- --   02/28/23 2330 -- 92 24 Abnormal  110/55 77 96 % -- -- -- --   02/28/23 2328 98 8 °F (37 1 °C) -- -- -- -- -- -- -- -- --   02/28/23 2315 -- 96 25 Abnormal  108/59 78 96 % -- -- -- --   02/28/23 2300 -- 96 30 Abnormal  107/55 76 94 % -- -- -- --   02/28/23 2245 -- 114 Abnormal  39 Abnormal  112/76 86 95 % -- -- -- --   02/28/23 2232 -- -- -- 113/55 -- -- -- -- -- --   02/28/23 2230 -- 98 25 Abnormal  113/55 79 96 % -- -- -- --   02/28/23 2215 -- 99 36 Abnormal  110/55 77 95 % -- -- -- --   02/28/23 2200 -- 97 27 Abnormal  107/53 77 95 % -- -- -- --   02/28/23 2145 -- 119 Abnormal  24 Abnormal  115/53 74 94 % -- -- -- --   02/28/23 2140 -- 105 34 Abnormal  103/66 80 94 % -- -- -- --   02/28/23 2135 -- 118 Abnormal  42 Abnormal  101/60 75 95 % -- -- -- --   02/28/23 2134 -- 120 Abnormal  24 Abnormal  -- -- -- -- -- -- --   02/28/23 2133 -- 120 Abnormal  22 -- -- -- -- -- -- --   02/28/23 2132 -- 212 Abnormal  34 Abnormal  -- -- 95 % -- -- -- --   02/28/23 2131 -- 167 Abnormal  26 Abnormal  -- -- 94 % -- -- -- --   02/28/23 2130 -- 148 Abnormal  30 Abnormal  110/56 79 95 % -- -- -- --   02/28/23 2129 -- 112 Abnormal  31 Abnormal  119/58 82 94 % -- -- -- --   02/28/23 2128 -- 110 Abnormal  25 Abnormal  120/58 83 94 % -- -- -- --   02/28/23 2127 -- 210 Abnormal  27 Abnormal  85/57 Abnormal  65 95 % -- -- -- --   02/28/23 2123 -- 214 Abnormal  25 Abnormal  -- -- 94 % -- -- -- --   02/28/23 2121 -- 123 Abnormal  51 Abnormal  119/58 81 95 % -- -- -- --   02/28/23 2115 -- 103 33 Abnormal  88/53 Abnormal  66 94 % -- -- -- --   02/28/23 2108 -- 110 Abnormal  22 -- -- 94 % -- -- -- --   02/28/23 2107 -- -- -- -- -- 94 % -- -- -- --   02/28/23 2100 -- 101 27 Abnormal  -- -- 95 % -- -- -- --   02/28/23 2045 -- 106 Abnormal  36 Abnormal  -- -- 95 % -- -- -- --   02/28/23 2035 -- -- -- -- -- -- 28 2 L/min Nasal cannula --   02/28/23 2030 -- 108 Abnormal  42 Abnormal  -- -- 92 % -- -- -- --   02/28/23 2015 -- 107 Abnormal  33 Abnormal  -- -- 92 % -- -- -- --   02/28/23 2006 101 2 °F (38 4 °C) Abnormal  116 Abnormal  33 Abnormal  99/54 69 92 % -- -- None (Room air) Lying   02/28/23 2000 101 2 °F (38 4 °C) Abnormal  109 Abnormal  43 Abnormal  99/54 69 92 % -- -- None (Room air) Lying   02/28/23 1900 -- 115 Abnormal  35 Abnormal  -- -- 92 % -- -- -- --   02/28/23 1700 97 7 °F (36 5 °C) 114 Abnormal  20 122/61 88 94 % -- -- None (Room air) --   02/28/23 16:36:48 -- 108 Abnormal  -- -- -- 92 % -- -- -- --   02/28/23 16:34:01 -- -- -- 127/91 -- -- -- -- -- --   02/28/23 16:33:17 -- 109 Abnormal  -- -- -- 92 % -- -- -- --   02/28/23 16:30:42 -- 107 Abnormal  16 122/86 -- 91 % -- -- -- --   02/28/23 16:30:17 -- 109 Abnormal  -- -- -- 91 % -- -- -- --   02/28/23 16:27:17 -- 101 -- -- -- 91 % -- -- -- --   02/28/23 16:26:36 -- 111 Abnormal  -- 133/80 -- 92 % -- -- -- --   02/28/23 16:24:17 -- 115 Abnormal  -- -- -- 91 % -- -- -- --   02/28/23 16:22:51 -- -- -- -- -- 93 %  -- -- -- --   SpO2: RA at 02/28/23 1622 02/28/23 16:22:33 -- -- 18 125/99 -- -- -- -- -- --   02/28/23 16:22:30 -- 212 Abnormal  -- 125/99 108 93 % -- -- -- --   02/28/23 16:22:15 -- 202 Abnormal  -- -- -- -- -- -- -- --   02/28/23 1620 -- -- -- -- -- 92 % -- -- None (Room air) --   02/28/23 1457 -- -- -- -- -- -- -- -- None (Room air) --   02/28/23 14:53:42 98 6 °F (37 °C) 111 Abnormal  18 135/93 107 94 % -- -- -- --   02/28/23 1420 -- 210 Abnormal  16 117/56 -- 93 % -- -- None (Room air) --   02/28/23 1414 -- 212 Abnormal  -- -- -- -- -- -- -- --   02/28/23 1411 -- 129 Abnormal  -- -- -- -- -- -- -- --   02/28/23 1409 -- 210 Abnormal  20 -- -- 94 % -- -- -- --   02/28/23 1401 -- 111 Abnormal  20 114/58 79 95 % -- -- None (Room air) Sitting   02/28/23 1359 -- 200 Abnormal  -- -- -- -- -- -- -- --   02/28/23 1215 -- 99 20 102/58 76 94 % -- -- -- --   02/28/23 1145 -- 96 22 97/54 74 93 % -- -- None (Room air) --   02/28/23 1130 -- 99 20 108/58 78 93 % -- -- -- --   02/28/23 1114 -- 101 20 102/57 72 92 % -- -- None (Room air) Sitting   02/28/23 1057 -- 96 -- 100/59 -- -- -- -- -- --   02/28/23 1045 96 8 °F (36 °C) Abnormal  170 Abnormal  18 154/92 -- 90 % -- -- None (Room air) Sitting         EKG: Patient location:  ED   Previous ECG:     Comparison to cardiac monitor: Yes     Quality:     Tracing quality:  Limited by artifact   Rate:     ECG rate:  033     ECG rate assessment: tachycardic     Rhythm:     Rhythm: SVT     QRS:     QRS axis:  Left     QRS intervals:  Normal   Conduction:     Conduction: normal     ST segments:     ST segments:  Non-specific   T waves:     T waves: non-specific        Pertinent Labs/Diagnostic Test Results:   CTA chest pe study   Final Result by Ruthann Aponte MD (02/28 1628)      No pulmonary embolus  Tree-in-bud nodularity in the dependent portion of both upper lobes and both lower lobes with mild bilateral lower lobe consolidation compatible with bronchiolitis / pneumonia  Alternatively, this could be due to aspiration in the appropriate clinical    setting  4 2 cm right thyroid nodule with mass effect on the trachea  Recommend follow-up with nonemergent thyroid ultrasound  The study was marked in [facility] for immediate notification and follow-up  Workstation performed: [id number]         XR chest 1 view portable   ED Interpretation by Shen Gardiner DO (02/28 1128)   No Acute cardiopulmonary disease      Final Result by Rushie Goldmann, MD (02/28 1312)      Mild pulmonary vascular congestion                    Workstation performed: [id number]           Results from last 7 days   Lab Units 02/28/23  1053   SARS-COV-2  Negative     Results from last 7 days   Lab Units 03/01/23  0513 02/28/23  1053   WBC Thousand/uL 10 46* 13 00*   HEMOGLOBIN g/dL 12 7 14 3   HEMATOCRIT % 38 8 43 6   PLATELETS Thousands/uL 318 351   NEUTROS ABS Thousands/µL 6 36 8 31*         Results from last 7 days   Lab Units 03/01/23  0513 02/28/23 2050 02/28/23  1053   SODIUM mmol/L 136 134* 135 POTASSIUM mmol/L 4 0 3 6 3 8   CHLORIDE mmol/L 101 101 100   CO2 mmol/L 29 27 25   ANION GAP mmol/L 6 6 10   BUN mg/dL 14 20 20   CREATININE mg/dL 0 48* 0 59* 0 61   EGFR ml/min/1 73sq m 94 88 87   CALCIUM mg/dL 8 8 9 0 10 0   CALCIUM, IONIZED mmol/L  --  1 13 1 10*   MAGNESIUM mg/dL 2 8* 1 8* 2 0   PHOSPHORUS mg/dL 2 4 2 5 2 7     Results from last 7 days   Lab Units 03/01/23  0513 02/28/23  1053   AST U/L 20 24   ALT U/L 15 14   ALK PHOS U/L 57 63   TOTAL PROTEIN g/dL 7 1 7 9   ALBUMIN g/dL 3 8 4 1   TOTAL BILIRUBIN mg/dL 0 53 0 55     Results from last 7 days   Lab Units 03/01/23  1134 03/01/23  0745 02/28/23  2201 02/28/23  1808   POC GLUCOSE mg/dl 117 152* 147* 165*     Results from last 7 days   Lab Units 03/01/23  0513 02/28/23  2050 02/28/23  1053   GLUCOSE RANDOM mg/dL 136 198* 159*         Results from last 7 days   Lab Units 02/28/23  1809   HEMOGLOBIN A1C % 6 3*   EAG mg/dl 134       Results from last 7 days   Lab Units 02/28/23  1500 02/28/23  1255 02/28/23  1053   HS TNI 0HR ng/L  --   --  8   HS TNI 2HR ng/L  --  8  --    HSTNI D2 ng/L  --  0  --    HS TNI 4HR ng/L 10  --   --    HSTNI D4 ng/L 2  --   --      Results from last 7 days   Lab Units 02/28/23  1053   D-DIMER QUANTITATIVE ug/ml FEU 1 30*     Results from last 7 days   Lab Units 02/28/23  1053   PROTIME seconds 13 5   INR  1 02   PTT seconds 29     Results from last 7 days   Lab Units 02/28/23  1053   TSH 3RD GENERATON uIU/mL 0 953     Results from last 7 days   Lab Units 03/01/23  0513 02/28/23  1053   PROCALCITONIN ng/ml 0 13 0 10     Results from last 7 days   Lab Units 02/28/23  1809   LACTIC ACID mmol/L 0 9             Results from last 7 days   Lab Units 02/28/23  1053   BNP pg/mL 202*       Results from last 7 days   Lab Units 02/28/23  1501   CLARITY UA  Clear   COLOR UA  Yellow   SPEC GRAV UA  1 010   PH UA  6 0   GLUCOSE UA mg/dl Negative   KETONES UA mg/dl 15 (1+)*   BLOOD UA  Small*   PROTEIN UA mg/dl 30 (1+)*   NITRITE UA Negative   BILIRUBIN UA  Small*   UROBILINOGEN UA E U /dl 0 2   LEUKOCYTES UA  Negative   WBC UA /hpf None Seen   RBC UA /hpf 0-1   BACTERIA UA /hpf None Seen   EPITHELIAL CELLS WET PREP /hpf Occasional     Results from last 7 days   Lab Units 02/28/23  2348 02/28/23  1053   STREP PNEUMONIAE ANTIGEN, URINE  Negative  --    LEGIONELLA URINARY ANTIGEN  Negative  --    INFLUENZA A PCR   --  Negative   INFLUENZA B PCR   --  Negative   RSV PCR   --  Negative       Results from last 7 days   Lab Units 02/28/23  1810 02/28/23  1758   BLOOD CULTURE  Received in Microbiology Lab  Culture in Progress  Received in Microbiology Lab  Culture in Progress  ED Treatment:   Medication Administration from 02/28/2023 1038 to 02/28/2023 1440       Date/Time Order Dose Route Action     02/28/2023 1055 EST sodium chloride 0 9 % bolus 1,000 mL 1,000 mL Intravenous New Bag     02/28/2023 1054 EST diltiazem (CARDIZEM) injection 20 mg 20 mg Intravenous Given     02/28/2023 1057 EST metoprolol tartrate (LOPRESSOR) tablet 25 mg 25 mg Oral Given     02/28/2023 1408 EST adenosine (ADENOCARD) injection 6 mg 6 mg Intravenous Given     02/28/2023 1420 EST metoprolol (LOPRESSOR) injection 5 mg 5 mg Intravenous Given          Present on Admission:  • Sepsis ([address] )  • SVT (supraventricular tachycardia) (HCC)  • Pneumonia  • Dyspnea on exertion  • Sleep apnea      Admitting Diagnosis: Atrial flutter (HCC) [I48 92]  Palpitations [R00 2]  SVT (supraventricular tachycardia) (HCC) [I47 1]  Rapid heart rate [R00 0]  Pulmonary vascular congestion [R09 89]     Age/Sex: 68 y o  female     Admission Orders: SCD's; I/O; Daily wts; I/S; Cardiopulmonary monitoring; cardiac diet - low carb - fluid restriction 1800;  Accuchecks with [facility]    Scheduled Medications:  aspirin, 81 mg, Oral, Daily  atorvastatin, 40 mg, Oral, Daily With Dinner  cefTRIAXone, 1,000 mg, Intravenous, Q24H  docusate sodium, 100 mg, Oral, BID  enoxaparin, 40 mg, Subcutaneous, Daily  insulin lispro, 1-6 Units, Subcutaneous, TID AC  insulin lispro, 1-6 Units, Subcutaneous, HS  midazolam, , ,   phenylephrine HCl, , ,   phenylephrine HCl, , ,   phenylephrine HCl, , ,   verapamil, 80 mg, Oral, Q8H XAVIER    adenosine (ADENOCARD) injection 6 mg  Dose: 6 mg  Freq: Once Route: IV  Start: 02/28/23 2130 End: 02/28/23 2125    adenosine (ADENOCARD) injection 6 mg  Dose: 6 mg  Freq: Once Route: IV  Start: 02/28/23 1400 End: 02/28/23 1408    albumin human (FLEXBUMIN) 5 % injection 12 5 g  Dose: 12 5 g  Freq: Once Route: IV  Start: 02/28/23 2130 End: 02/28/23 2138    magnesium sulfate 2 g/50 mL IVPB (premix) 2 g  Dose: 2 g  Freq: Once Route: IV  Last Dose: Stopped (02/28/23 2206)  Start: 02/28/23 2145 End: 02/28/23 2206    metoprolol (LOPRESSOR) injection 5 mg  Dose: 5 mg  Freq: Once Route: IV  Start: 02/28/23 1430 End: 02/28/23 1420    verapamil (ISOPTIN) injection 5 mg  Dose: 5 mg  Freq: 2 times daily Route: IV  Start: 02/28/23 2200 End: 03/01/23 0333        Continuous IV Infusions:  phenylephine,  mcg/min, Intravenous, Titrated      PRN Meds:  acetaminophen, 650 mg, Oral, Q6H PRN  benzonatate, 100 mg, Oral, TID PRN  levalbuterol, 1 25 mg, Nebulization, Q4H PRN (2/28 rec'd x 1)   ondansetron, 4 mg, Intravenous, Q6H PRN    metoprolol (LOPRESSOR) injection 5 mg (2/28 rec'd x 3)   Dose: 5 mg  Freq: Every 6 hours PRN Route: IV  PRN Comment: HR>110  Start: 02/28/23 1712 End: 03/01/23 0254     IP CONSULT TO CARDIOLOGY  IP CONSULT TO CASE MANAGEMENT    Network Utilization Review Department  ATTENTION: Please call with any questions or concerns to [phone] and carefully listen to the prompts so that you are directed to the right person  All voicemails are confidential   Arno Leah all requests for admission clinical reviews, approved or denied determinations and any other requests to dedicated fax number below belonging to the campus where the patient is receiving treatment   List of dedicated fax numbers for the Facilities:  FACILITY NAME UR FAX NUMBER   ADMISSION DENIALS (Administrative/Medical Necessity) [phone]   [address]  (Maternity/NICU/Pediatrics) [phone]   [address]lene Huang [address] [phone]   [address] [address] [facility] [address] [facility] 134 815 [facility] [phone]

## 2023-03-01 NOTE — PHYSICAL THERAPY NOTE
PHYSICAL THERAPY NOTE          Patient Name: Evelyn Sen  YOAPJ'X Date: 3/1/2023       03/01/23 0840   Note Type   Note type Evaluation; Cancelled Session   Cancel Reasons Medical status     Received order for PT consult  Chart reviewed  Pt admitted with diagnosis sepsis  Discussed patient's status during early mobility rounds in ICU  Per provider, patient not appropriate for therapy services this date as she is pending potential transfer for EP consult  Will continue to follow and see patient as medically appropriate should she remain at this facility       Nidhi Purdy, PT,DPT

## 2023-03-01 NOTE — ASSESSMENT & PLAN NOTE
· Started on ceftriaxone D2  at Had 2 doses of azithromycin outpatient   · Continue Mucinex  · Strep and legionella negative  · AB are pending   · Blood cultures are pending   · COVID flu and RSV is negative  · Respiratory protocol   · Sputum cultures   · Fever treated with tylenol

## 2023-03-01 NOTE — NURSING NOTE
Late Entry   2030-Patient having intermittent episodes of SVT, no sustained at this time  Patient asymptomatic during episodes  Scooter ANGUIANO made aware  2115-Sustained SVT noted (HR-180's-190's )  Patient states, "I experience a warm sensation that starts at my mid back radiating downward ) Patient placed on Lifepak  Scooter ANGUIANO made aware, new orders obtained  EKG obtained  0445-After repositioning, patient noted to be flushed and diaphoretic  HR-190-200's  During this time, patient did experience a coughing episode  EKG obtained  Patient placed on Lifepak  Scooter ANGUIANO made aware and present at bedside  New orders obtained

## 2023-03-01 NOTE — TRANSPORTATION MEDICAL NECESSITY
Section I - General Information    Name of Patient: Layton Brooks                 : 1945    Medicare #: [id number]  Transport Date: 23 (PCS is valid for round trips on this date and for all repetitive trips in the 60-day range as noted below )  Origin: [address] [place] [address] [place]: [facility]    Is the pt's stay covered under Medicare Part A (PPS/DRG)   []  No    Closest appropriate facility? If no, why is transport to more distant facility required? Yes  If hospice pt, is this transport related to pt's terminal illness? No       Section II - Medical Necessity Questionnaire  Ambulance transportation is medically necessary only if other means of transport are contraindicated or would be potentially harmful to the patient  To meet this requirement, the patient must either be "bed confined" or suffer from a condition such that transport by means other than ambulance is contraindicated by the patient's condition  The following questions must be answered by the medical professional signing below for this form to be valid:    1)  Describe the MEDICAL CONDITION (physical and/or mental) of this patient AT [address] [place] [address] that requires the patient to be transported in an ambulance and why transport by other means is contraindicated by the patient's condition: SVT    2) Is the patient "bed confined" as defined below? Yes  To be "be confined" the patient must satisfy all three of the following conditions: (1) unable to get up from bed without Assistance; AND (2) unable to ambulate; AND (3) unable to sit in a chair or wheelchair  3) Can this patient safely be transported by car or wheelchair van (i e , seated during transport without a medical attendant or monitoring)?    No    4) In addition to completing questions 1-3 above, please check any of the following conditions that apply*:   *Note: supporting documentation for any boxes checked must be maintained in the patient's medical records  IV meds/fluids required   Cardiac monitoring required en route      If hosp-hosp transfer, describe services needed at 2nd facility not available at 1st facility? EPS services that are not available at this time      Section III - Signature of Physician or Healthcare Professional  I certify that the above information is true and correct based on my evaluation of this patient, and represent that the patient requires transport by ambulance and that other forms of transport are contraindicated  I understand that this information will be used by the Centers for Medicare and Medicaid Services (CMS) to support the determination of medical necessity for ambulance services, and I represent that I have personal knowledge of the patient's condition at time of transport  []  If this box is checked, I also certify that the patient is physically or mentally incapable of signing the ambulance service's claim and that the institution with which I am affiliated has furnished care, services, or assistance to the patient  My signature below is made on behalf of the patient pursuant to 42 CFR §424 36(b)(4)  In accordance with 42 CFR §424 37, the specific reason(s) that the patient is physically or mentally incapable of signing the claim form is as follows: Gabriela Hugo of Physician* or Healthcare Professional______________________________________________________________  Signature Date 03/01/23 (For scheduled repetitive transports, this form is not valid for transports performed more than 60 days after this date)    Printed Name & Credentials of Physician or Healthcare Professional (MD, DO, RN, etc )____Purvi Fermin RN____________________________  *Form must be signed by patient's attending physician for scheduled, repetitive transports   For non-repetitive, unscheduled ambulance transports, if unable to obtain the signature of the attending physician, any of the following may sign (choose appropriate option below)  [] Physician Assistant []  Clinical Nurse Specialist [x]  Registered Nurse  []  Nurse Practitioner  [] Discharge Planner

## 2023-03-01 NOTE — PROGRESS NOTES
ICU acceptance Note - Luis Miguel Rodríguez 1945, 68 y o  female MRN: [id number]  Unit/Bed#: -01 Encounter: [id number]  Primary Care Provider: Alexandria Rust MD   Date and time admitted to hospital: 2/28/2023 10:38 AM    Class 1 obesity due to excess calories with serious comorbidity and body mass index (BMI) of 32 0 to 32 9 in adult  Assessment & Plan  nutrional information    Sleep apnea  Assessment & Plan  Patient supposed to wear CPAP  Hold due to sputum production   Dyspnea on exertion  Assessment & Plan  Patient feels volume up   Echocardiogram is pending   Patient had a cardiac cath in 2016 which was negative, he also had a stress test in 2016 and 2019, patient SVT history is since 2016  BNP is elevated at 202  Liter of IV fluid bolus and 250 mls albumin   CTA of the chest was negative for PE, pneumonia postive   Most likely mild volume overload with bilateral lower extremity swelling, orthopnea dyspnea on exertion    Pneumonia  Assessment & Plan  · Started on ceftriaxone at 2 doses of azithromycin outpatient   · Strep and legionella AB are pending   · Blood cultures are pending   · COVID flu and RSV is negative  · Respiratory protocol   · Sputum cultures   · Fever treated with tylenol       SVT (supraventricular tachycardia) ([facility] [address] )  Assessment & Plan  Pt was treated in the ER for SVT with adenosine,  IV and PO metoprolol and Cardizem,no response to Cardizem   · Patient had a rapid response called on her during the day and was transferred to level 2 SD was given metoprolol p o  25 mg and IV Lopressor  · TSH is normal, potassium was 3 8 and magnesium was 2  · After repeated labs at 830 pm -" the patient's bedside for recurrent SVT at 920 PM, patient was placed on the monitor with pads     · See if another 5 mg lopressor, 6 mg of adenosine, no good response-2 mg of IV magnesium 40 mg of IV potassium  · Patient was switched verapamil 5 mg IV and 40 mg PO q 8 hours   · Heart rate is better controlled  · Cardiology consult  · Patient may need EP consult for evaluation, he has been going into SVT frequently since 2016  * Sepsis [facility])  Assessment & Plan  Patient meets sepsis criteria-likely pneumonia suspected  Is on ceftriaxone only   CTA PE is negative for PE but has bilateral lower lobe consolidation bronchiolitis versus pneumonia  Procalcitonin ending x1 negative  Patient recently had COVID in January  Respiratory protocol  Emphysematous changes on CT scan         ----------------------------------------------------------------------------------------  HPI/24hr events:  Patient is a 77-year-old with a medical history of SVT, diabetes and hypertension, who was diagnosed in 2016 with SVT had a Cardiac cath done and 2 stress test - clean  Patient was seen in January 2023 in the ER with episodes of palpitations and dizziness, tested positive for COVID, then discharged with recommended proceeding cardiologist for elevated heart rate  Upcoming appointment is next week  2 days ago she went to her primary care doctor due to persistent coughing and fever, at the primary care office her heart rate was 140 she had a temperature of 101 8 persistent cough  He was started on azithromycin and Tessalon Perles  Patient the next day had a heart rate into the 160s and called her primary care office which stated to go to the ER  In the ER patient received 1 L of fluid, adenosine IV and p o  Lopressor and cardizem  Was admitted as MedSurg, response was called and patient was transferred to stepdown 2  I was called approximately 930pm patient going in and out of SVT, rates up to 220, pt was placed on monitor with pads on  Labs were ordered for repeat and pt's electrolytes were replenished  Patient received 40 IV potassium and 2 g of magnesium, received another dose of Lopressor and adenosine with no response to episodic increase of heart rate to 220    He had 1 episode of a widened complex, which resolved after lopressor 5 mg IV, patient remained to go in and out of SVT, she remained awake but was able to tell when episodes happened  Switched medications to verapamil 5 mg IV and then started on verapamil 40 mg PO q 8 hours  Pt seemed to be more tolerant  Pt to remain on pads overnight         Disposition: Transfer to Stepdown Level 1   Code Status: Level 1 - Full Code  ---------------------------------------------------------------------------------------  SUBJECTIVE  Pt feels when the heart rate rises a hot felling and strange sensation in her back     Review of Systems   Constitutional: Positive for chills and fever  Respiratory: Positive for chest tightness and shortness of breath  Cardiovascular: Positive for palpitations and leg swelling  Neurological: Positive for weakness  Review of systems was reviewed and negative unless stated above in HPI/24-hour events   ---------------------------------------------------------------------------------------  OBJECTIVE    Vitals   Vitals:    23 2107 23 2108 23 2200 23 2232   BP:   107/53 113/55   BP Location:       Pulse:  (!) 110 97    Resp:  22 (!) 27    Temp:       TempSrc:       SpO2: 94% 94% 95%    Weight:       Height:         Temp (24hrs), Av 6 °F (37 °C), Min:96 8 °F (36 °C), Max:101 2 °F (38 4 °C)  Current: Temperature: (!) 101 2 °F (38 4 °C)          Respiratory:  SpO2: SpO2: 95 %, SpO2 Activity: SpO2 Activity: At Rest, SpO2 Device: O2 Device: Nasal cannula  Nasal Cannula O2 Flow Rate (L/min): 2 L/min    Invasive/non-invasive ventilation settings   Respiratory    Lab Data (Last 4 hours)    None         O2/Vent Data (Last 4 hours)    None                Physical Exam  Vitals and nursing note reviewed  Constitutional:       General: She is not in acute distress  Appearance: She is well-developed  She is obese  She is not diaphoretic  HENT:      Head: Normocephalic and atraumatic        Mouth/Throat:      Mouth: Mucous membranes are dry  Pharynx: No oropharyngeal exudate  Eyes:      Conjunctiva/sclera: Conjunctivae normal       Pupils: Pupils are equal, round, and reactive to light  Neck:      Vascular: No JVD  Trachea: No tracheal deviation  Cardiovascular:      Rate and Rhythm: Regular rhythm  Tachycardia present  Pulses: Normal pulses  Heart sounds: No murmur heard  No friction rub  Gallop present  Pulmonary:      Effort: Pulmonary effort is normal  No respiratory distress  Breath sounds: Wheezing present  No rales  Comments: Coarse breath sounds   Chest:      Chest wall: No tenderness  Abdominal:      General: Bowel sounds are normal  There is no distension  Palpations: Abdomen is soft  Tenderness: There is no abdominal tenderness  There is no guarding  Musculoskeletal:         General: No tenderness or deformity  Normal range of motion  Cervical back: Normal range of motion and neck supple  Right lower leg: Edema present  Left lower leg: Edema present  Skin:     General: Skin is warm and dry  Capillary Refill: Capillary refill takes less than 2 seconds  Coloration: Skin is pale  Findings: No erythema  Neurological:      Mental Status: She is alert and oriented to person, place, and time                  Laboratory and Diagnostics:  Results from last 7 days   Lab Units 02/28/23  1053   WBC Thousand/uL 13 00*   HEMOGLOBIN g/dL 14 3   HEMATOCRIT % 43 6   PLATELETS Thousands/uL 351   NEUTROS PCT % 63   MONOS PCT % 17*     Results from last 7 days   Lab Units 02/28/23 2050 02/28/23  1053   SODIUM mmol/L 134* 135   POTASSIUM mmol/L 3 6 3 8   CHLORIDE mmol/L 101 100   CO2 mmol/L 27 25   ANION GAP mmol/L 6 10   BUN mg/dL 20 20   CREATININE mg/dL 0 59* 0 61   CALCIUM mg/dL 9 0 10 0   GLUCOSE RANDOM mg/dL 198* 159*   ALT U/L  --  14   AST U/L  --  24   ALK PHOS U/L  --  63   ALBUMIN g/dL  --  4 1   TOTAL BILIRUBIN mg/dL  --  0 55     Results from last 7 days   Lab Units 02/28/23 2050 02/28/23  1053   MAGNESIUM mg/dL 1 8* 2 0   PHOSPHORUS mg/dL 2 5 2 7      Results from last 7 days   Lab Units 02/28/23  1053   INR  1 02   PTT seconds 29          Results from last 7 days   Lab Units 02/28/23  1809   LACTIC ACID mmol/L 0 9     ABG:    VBG:    Results from last 7 days   Lab Units 02/28/23  1053   PROCALCITONIN ng/ml 0 10       Micro        EKG: SVT - sinus tachacrdia   Imaging: I have personally reviewed pertinent reports  Intake and Output  I/O       02/27 0701  02/28 0700 02/28 0701  03/01 0700    P  O   360    I V  (mL/kg)  30 (0 3)    IV Piggyback  1100    Total Intake(mL/kg)  1490 (17 3)    Net  +1490          Unmeasured Urine Occurrence  1 x          Height and Weights   Height: 5' 4" (162 6 cm)  IBW (Ideal Body Weight): 54 7 kg  Body mass index is 32 61 kg/m²  Weight (last 2 days)     Date/Time Weight    02/28/23 1457 86 2 (190)    02/28/23 1045 85 7 (189)            Nutrition       Diet Orders   (From admission, onward)             Start     Ordered    02/28/23 1716  Diet Cardiovascular; Sodium 2 GM; Consistent Carbohydrate Diet Level 1 (4 carb servings/60 grams CHO/meal), Fluid Restriction 1800 ML  Diet effective now        References:    Nutrtion Support Algorithm Enteral vs  Parenteral   Question Answer Comment   Diet Type Cardiovascular    Cardiac Sodium 2 GM    Other Restriction(s): Consistent Carbohydrate Diet Level 1 (4 carb servings/60 grams CHO/meal)    Other Restriction(s): Fluid Restriction 1800 ML    RD to adjust diet per protocol?  Yes        02/28/23 1715                  Active Medications  Scheduled Meds:  Current Facility-Administered Medications   Medication Dose Route Frequency Provider Last Rate   • acetaminophen  650 mg Oral Q6H PRN Adriana Polk MD     • adenosine          • [START ON 3/1/2023] aspirin  81 mg Oral Daily Adriana Polk MD     • atorvastatin  40 mg Oral Daily With Jose Daniel Locke MD     • benzonatate  100 mg Oral TID PRN Reynold Comer PA-C     • cefTRIAXone  1,000 mg Intravenous Q24H Jaylan Alves MD Stopped (02/28/23 1801)   • docusate sodium  100 mg Oral BID Jaylan Alves MD     • [START ON 3/1/2023] enoxaparin  40 mg Subcutaneous Daily Jaylan Alves MD     • insulin lispro  1-6 Units Subcutaneous TID AC Abner Lomeli MD     • insulin lispro  1-6 Units Subcutaneous HS Jaylan Alves MD     • levalbuterol  1 25 mg Nebulization Q4H PRN Jaylan Alves MD     • metoprolol  5 mg Intravenous Q6H PRN Jaylan Alves MD     • ondansetron  4 mg Intravenous Q6H PRN Andreina Lomeli MD     • potassium chloride  20 mEq Intravenous Once Reynold Comer PA-C 20 mEq (02/28/23 2138)   • potassium chloride  20 mEq Intravenous Once [facility]     • verapamil  40 mg Oral Q8H [address] Reynold Comer PA-C     • verapamil  5 mg Intravenous BID gabriel Comer PA-C       Continuous Infusions:     PRN Meds:   acetaminophen, 650 mg, Q6H PRN  benzonatate, 100 mg, TID PRN  levalbuterol, 1 25 mg, Q4H PRN  metoprolol, 5 mg, Q6H PRN  ondansetron, 4 mg, Q6H PRN        Invasive Devices Review  Invasive Devices     Peripheral Intravenous Line  Duration           Peripheral IV 02/28/23 Dorsal (posterior); Right Hand <1 day    Peripheral IV 02/28/23 Left Antecubital <1 day                Rationale for remaining devices:   ---------------------------------------------------------------------------------------  Advance Directive and Living Will:      Power of :    POLST:    ---------------------------------------------------------------------------------------  Care Time Delivered:   Upon my evaluation, this patient had a high probability of imminent or life-threatening deterioration due to  SVT uncontrolled at times with comprise, sepsis, PNA , which required my direct attention, intervention, and personal management    I have personally provided 90 minutes (2140 to 2310) of critical care time, exclusive of procedures, teaching, family meetings, and any prior time recorded by providers other than myself  Reynold Comer PA-C      Portions of the record may have been created with voice recognition software  Occasional wrong word or "sound a like" substitutions may have occurred due to the inherent limitations of voice recognition software    Read the chart carefully and recognize, using context, where substitutions have occurred

## 2023-03-01 NOTE — ASSESSMENT & PLAN NOTE
· Started on ceftriaxone at 2 doses of azithromycin outpatient   · Strep and legionella AB are pending   · Blood cultures are pending   · COVID flu and RSV is negative  · Respiratory protocol   · Sputum cultures   · Fever treated with tylenol

## 2023-03-01 NOTE — SPEECH THERAPY NOTE
Speech Language/Pathology  Speech-Language Pathology Bedside Swallow Evaluation      Patient Name: Julio Sinclair    [id number] Date: 3/1/2023       Summary   Consult received secondary to questionable aspiration on CTA  Pt admitted w/ SVT w/ PMHx of thyroid nodule and sleep apnea  Currently on clears and is pending for transfer to higher level of care  Pt denies difficulty chewing/swallowing  Reports recent bronchitis/covid infection and does have a baseline cough  RN Wong Vaughn reports no concerns w/ swallowing, took meds whole without difficulty  Seen w/ limited trials of thins via straw and jello  Oral phase appears grossly WFL  Suspected pharyngeal phase also WFL but difficult to determine at bedside as congested cough present mostly apart from PO intake but noted x1 following trial of jello  Recommend to continue diet per medical team, no texture restrictions per SLP  SLP will s/o, please reconsult if needed  Pt would benefit from further assessment from SLP following medical stabilization    Risk/s for Aspiration: Low-Moderate     Recommended Diet: Clears per medical team   Recommended Form of Meds: whole with liquid   Aspiration precautions and swallowing strategies: upright posture and only feed when fully alert  Other Recommendations: Continue frequent oral care        Current Medical Status  Julio Sinclair is a 68 y o  female with a PMH of coronary artery disease, SVT who presents with palpitation, short of breath  Patient reports recently she diagnosed with bronchiolitis and started antibiotic  But for the past couple of weeks, she feels exhausted, tired and short of breath  Patient lives in assisted facility, reports when she walks down the hallway, end of the hallway she needs to take rest to catch up her bed  Patient also reports she feels winded  Also reports some swelling in the lower extremities  Denies any fever, nausea, vomiting, chest pain  Pulmonary smoker    Social drinker  Current Precautions:  Fall      Allergies:  No known food allergies    Past medical history:  Please see H&P for details    Special Studies:  CTA chest:  No pulmonary embolus  Tree-in-bud nodularity in the dependent portion of both upper lobes and both lower lobes with mild bilateral lower lobe consolidation compatible with bronchiolitis / pneumonia  Alternatively, this could be due to aspiration in the appropriate clinical   setting  4 2 cm right thyroid nodule with mass effect on the trachea  Recommend follow-up with nonemergent thyroid ultrasound  Social/Education/Vocational Hx:  Pt lives in assisted living facility    Swallow Information   Current Risks for Dysphagia & Aspiration: PNA  Current Symptoms/Concerns: coughing with po and change in respiratory status  Current Diet: clears   Baseline Diet: regular diet and thin liquids      Baseline Assessment   Behavior/Cognition: alert  Speech/Language Status: able to participate in conversation  Patient Positioning: upright in bed  Pain Status/Interventions/Response to Interventions:   No report of or nonverbal indications of pain  Swallow Mechanism Exam  Facial: symmetrical  Labial: WFL  Lingual: WFL  Velum: symmetrical  Mandible: adequate ROM  Dentition: adequate  Vocal quality:clear/adequate   Volitional Cough: strong/productive   Respiratory Status: on 2L nasal cannula      Consistencies Assessed and Performance   Consistencies Administered: thin liquids and jello    Oral Stage: WFL  Mastication was adequate with the materials administered today  Bolus formation and transfer were functional with no significant oral residue noted  No overt s/s reduced oral control  Pharyngeal Stage: WFL  Swallow Mechanics:  Swallowing initiation appeared prompt  Laryngeal rise was palpated and judged to be within functional limits    No coughing, throat clearing, change in vocal quality or respiratory status noted today however baseline congested cough noted  Esophageal Concerns: none reported    Summary and Recommendations (see above)    Results Reviewed with: patient and RN     Treatment Recommended: None at this time, recommend reassessment when diet advanced/pt medically stabilized      Cecy Fay Zackery 87 CCC-SLP  3/1/2023

## 2023-03-01 NOTE — OCCUPATIONAL THERAPY NOTE
Occupational Therapy Cancellaiton Note       Patient Name: Julio ALONSO Date: 3/1/2023  Problem List  Principal Problem:    Sepsis [facility])  Active Problems:    SVT (supraventricular tachycardia) ([facility] [address] )    Pneumonia    Dyspnea on exertion    Sleep apnea    Class 1 obesity due to excess calories with serious comorbidity and body mass index (BMI) of 32 0 to 32 9 in adult    Thyroid nodule            03/01/23 0830   Note Type   Note type Evaluation; Cancelled Session   Cancel Reasons Medical status       OT orders received  Chart review completed  Pt admitted to [address] [place] [address] 2/28/2023 with Dx: sepsis  Pt discussed in ICU mobility rounds, and was suggested by medical care team to hold OT services at this time d/t need for transfer to higher level of care for EP consult  Will continue to follow and address as medically appropriate should Pt not transfer           Yulisa Parrish OTR/L

## 2023-03-01 NOTE — SEPSIS NOTE
Sepsis Note   Arnetha Mcardle 68 y o  female MRN: [id number]  Unit/Bed#: -01 Encounter: [id number]       Initial Sepsis Screening     [address] Name 03/01/23 0600                Is the patient's history suggestive of a new or worsening infection? Yes (Proceed)  -HO        Suspected source of infection pneumonia  -HO        Indicate SIRS criteria Tachycardia > 90 bpm;Tachypnea > 20 resp per min  -HO        Are two or more of the above signs & symptoms of infection both present and new to the patient? Yes (Proceed)  -HO        Assess for evidence of organ dysfunction: Are any of the below criteria present within 6 hours of suspected infection and SIRS criteria that are NOT considered to be chronic conditions? --              User Key  (r) = Recorded By, (t) = Taken By, (c) = Cosigned By    Initials Name Provider Type    AGGIE Ortiz PA-C Physician Assistant              Patient meets sepsis criteria not meet criteria for fluid resuscitation hypotension associated with a heart rate of 190 due to SVT more cardiac related      Body mass index is 33 94 kg/m²    Wt Readings from Last 1 Encounters:   03/01/23 89 7 kg (197 lb 12 oz)     IBW (Ideal Body Weight): 54 7 kg    Ideal body weight: 54 7 kg (120 lb 9 5 oz)  Adjusted ideal body weight: 68 7 kg (151 lb 7 3 oz)

## 2023-03-01 NOTE — ASSESSMENT & PLAN NOTE
Noted on chest imaging  Recent Covid infection 1/5/2023  Management as per critical care team    Likely contributing to rapid rates

## 2023-03-01 NOTE — ASSESSMENT & PLAN NOTE
History of paroxysmal SVT dating back to 2016  Has been on metoprolol tartrate managed by [address] [place] [address] Cardiology  Presents with increasing frequency of SVT episodes  Echocardiogram today shows EF 50% with no significant abnormalities similar to prior echo in 2019  Demonstrates episodes of SVT/AT with rates > 200 during this admission with temporary response to IV adenosine and IV Lopressor  Did not respond to IV Cardiazem  Also with episodes of WCT  HR's improved to low 100's with verapamil 80 mg TID  Dr Maricel Olson discussed case with Dr Jasen Sales with [facility] EP with plan for transfer to [facility] for EP consult  Continue telemetry  Optimize electrolytes for K+ > 4, Mag > 2

## 2023-03-01 NOTE — PROGRESS NOTES
[address]  Progress Note - Arnetha Mcardle 1945, 68 y o  female MRN: [id number]  Unit/Bed#: -01 Encounter: [id number]  Primary Care Provider: Carol Pearce MD   Date and time admitted to hospital: 2/28/2023 10:38 AM    Thyroid nodule  Assessment & Plan  Incidental finding on CT scan 4 2 cm right thyroid nodule with mass effect on the trachea  Recommend follow-up with nonemergent thyroid ultrasound    Class 1 obesity due to excess calories with serious comorbidity and body mass index (BMI) of 32 0 to 32 9 in adult  Assessment & Plan  nutrional information    Sleep apnea  Assessment & Plan  Patient supposed to wear CPAP  Hold due to sputum production   Dyspnea on exertion  Assessment & Plan  Patient feels volume up   Echocardiogram is pending   Patient had a cardiac cath in 2016 which was negative, he also had a stress test in 2016 and 2019, patient SVT history is since 2016     BNP is elevated at 202  Liter of IV fluid bolus and 250 mls albumin   CTA of the chest was negative for PE, pneumonia postive   Most likely mild volume overload with bilateral lower extremity swelling, orthopnea dyspnea on exertion    Pneumonia  Assessment & Plan  · Started on ceftriaxone at 2 doses of azithromycin outpatient   · Strep and legionella AB are pending   · Blood cultures are pending   · COVID flu and RSV is negative  · Respiratory protocol   · Sputum cultures   · Fever treated with tylenol       SVT (supraventricular tachycardia) ([facility] [address] )  Assessment & Plan  Pt was treated in the ER for SVT with adenosine,  IV and PO metoprolol and Cardizem,no response to Cardizem   · Patient had a rapid response called on her during the day and was transferred to level 2 SD was given metoprolol p o  25 mg and IV Lopressor  · TSH is normal, potassium was 3 8 and magnesium was 2  · After repeated labs at 830 pm -" the patient's bedside for recurrent SVT at 920 PM, patient was placed on the monitor with pads    · See if another 5 mg lopressor, 6 mg of adenosine, no good response-2 mg of IV magnesium 40 mg of IV potassium  · Patient was switched verapamil 5 mg IV and 40 mg PO q 8 hours   · Heart rate is better controlled  · Cardiology consult  · Patient may need EP consult for evaluation, he has been going into SVT frequently since 2016  · Increase verapamil to 80 mg PO q 8 hours      * Sepsis [facility])  Assessment & Plan  Patient meets sepsis criteria-likely pneumonia suspected  Is on ceftriaxone only   CTA PE is negative for PE but has bilateral lower lobe consolidation bronchiolitis versus pneumonia  Procalcitonin ending x1 negative  Patient recently had COVID in January  Respiratory protocol  Emphysematous changes on CT scan       ----------------------------------------------------------------------------------------  HPI/24hr events:   Started again with higher HR to the 160 pt appears to have incidents of R on T ?   - gave an extra dose of verapamil 40 increased q 8 hours to 80 mg PO   - gave another 2 grams of magnesium   - consider broading ABX   - may need steroids for cough   - may need lasix appears volume overloaded on exam   - no cpap overnight due to productive cough   - am episode of -200 adnesosine to break it gave verapamil 80 mg PO early   - received a dose of lopressor 5 mg IV x 1     Patient appropriate for transfer out of the ICU today?: No  Disposition: Continue Stepdown Level 1 level of care   Code Status: Level 1 - Full Code  ---------------------------------------------------------------------------------------  SUBJECTIVE  Pt does not feel the palpation now improved   Still cough with sputum production   Coarse breath sounds     Review of Systems   Constitutional: Positive for fatigue and fever  Respiratory: Positive for cough and shortness of breath  Cardiovascular: Positive for palpitations and leg swelling  Neurological: Positive for dizziness and weakness       Review of systems was reviewed and negative unless stated above in HPI/24-hour events   ---------------------------------------------------------------------------------------  OBJECTIVE    Vitals   Vitals:    23 0524 23 0530 23 0539 23 0544   BP: 115/57 115/57 (!) 89/52 117/57   BP Location:       Pulse: (!) 122 (S) (!) 199 (!) 128 95   Resp: (!) 24 (!) 24 (!) 27 (!) 23   Temp:       TempSrc:       SpO2: 96% 95% 97% 96%   Weight:       Height:         Temp (24hrs), Av 1 °F (37 3 °C), Min:96 8 °F (36 °C), Max:101 2 °F (38 4 °C)  Current: Temperature: 99 6 °F (37 6 °C)          Respiratory:  SpO2: SpO2: 96 %, SpO2 Activity: SpO2 Activity: At Rest, SpO2 Device: O2 Device: Nasal cannula  Nasal Cannula O2 Flow Rate (L/min): 2 L/min    Invasive/non-invasive ventilation settings   Respiratory    Lab Data (Last 4 hours)    None         O2/Vent Data (Last 4 hours)    None                Physical Exam  Vitals and nursing note reviewed  Constitutional:       General: She is not in acute distress  Appearance: She is well-developed  She is not diaphoretic  HENT:      Head: Normocephalic and atraumatic  Mouth/Throat:      Pharynx: No oropharyngeal exudate  Eyes:      Conjunctiva/sclera: Conjunctivae normal       Pupils: Pupils are equal, round, and reactive to light  Neck:      Vascular: No JVD  Trachea: No tracheal deviation  Cardiovascular:      Rate and Rhythm: Regular rhythm  Tachycardia present  Pulses: Normal pulses  Heart sounds: Normal heart sounds  No murmur heard  No friction rub  Pulmonary:      Effort: Pulmonary effort is normal  No respiratory distress  Breath sounds: Examination of the right-upper field reveals wheezing  Examination of the left-upper field reveals wheezing  Examination of the right-lower field reveals rhonchi  Examination of the left-lower field reveals rhonchi  Decreased breath sounds, wheezing and rhonchi present        Comments: Coarse breath sounds in the bases     Chest:      Chest wall: No tenderness  Abdominal:      General: Bowel sounds are normal  There is no distension  Palpations: Abdomen is soft  Tenderness: There is no abdominal tenderness  There is no guarding  Musculoskeletal:         General: No tenderness or deformity  Normal range of motion  Cervical back: Normal range of motion and neck supple  Right lower leg: Edema present  Left lower leg: Edema present  Skin:     General: Skin is warm and dry  Capillary Refill: Capillary refill takes less than 2 seconds  Coloration: Skin is pale  Findings: No erythema  Neurological:      Mental Status: She is alert and oriented to person, place, and time  Laboratory and Diagnostics:  Results from last 7 days   Lab Units 03/01/23  0513 02/28/23  1053   WBC Thousand/uL 10 46* 13 00*   HEMOGLOBIN g/dL 12 7 14 3   HEMATOCRIT % 38 8 43 6   PLATELETS Thousands/uL 318 351   NEUTROS PCT % 60 63   MONOS PCT % 16* 17*     Results from last 7 days   Lab Units 02/28/23 2050 02/28/23  1053   SODIUM mmol/L 134* 135   POTASSIUM mmol/L 3 6 3 8   CHLORIDE mmol/L 101 100   CO2 mmol/L 27 25   ANION GAP mmol/L 6 10   BUN mg/dL 20 20   CREATININE mg/dL 0 59* 0 61   CALCIUM mg/dL 9 0 10 0   GLUCOSE RANDOM mg/dL 198* 159*   ALT U/L  --  14   AST U/L  --  24   ALK PHOS U/L  --  63   ALBUMIN g/dL  --  4 1   TOTAL BILIRUBIN mg/dL  --  0 55     Results from last 7 days   Lab Units 02/28/23  2050 02/28/23  1053   MAGNESIUM mg/dL 1 8* 2 0   PHOSPHORUS mg/dL 2 5 2 7      Results from last 7 days   Lab Units 02/28/23  1053   INR  1 02   PTT seconds 29          Results from last 7 days   Lab Units 02/28/23  1809   LACTIC ACID mmol/L 0 9     ABG:    VBG:    Results from last 7 days   Lab Units 02/28/23  1053   PROCALCITONIN ng/ml 0 10       Micro  Results from last 7 days   Lab Units 02/28/23  1810 02/28/23  1758   BLOOD CULTURE  Received in Microbiology Lab  Culture in Progress  Received in Microbiology Lab  Culture in Progress  EKG: ST to SVT   Imaging: I have personally reviewed pertinent reports  Intake and Output  I/O       02/27 0701  02/28 0700 02/28 0701  03/01 0700    P  O   360    I V  (mL/kg)  30 (0 3)    IV Piggyback  1201 7    Total Intake(mL/kg)  1591 7 (18 5)    Net  +1591 7          Unmeasured Urine Occurrence  1 x          Height and Weights   Height: 5' 4" (162 6 cm)  IBW (Ideal Body Weight): 54 7 kg  Body mass index is 33 94 kg/m²  Weight (last 2 days)     Date/Time Weight    03/01/23 0453 89 7 (197 75)    02/28/23 1457 86 2 (190)    02/28/23 1045 85 7 (189)            Nutrition       Diet Orders   (From admission, onward)             Start     Ordered    02/28/23 1716  Diet Cardiovascular; Sodium 2 GM; Consistent Carbohydrate Diet Level 1 (4 carb servings/60 grams CHO/meal), Fluid Restriction 1800 ML  Diet effective now        References:    Nutrtion Support Algorithm Enteral vs  Parenteral   Question Answer Comment   Diet Type Cardiovascular    Cardiac Sodium 2 GM    Other Restriction(s): Consistent Carbohydrate Diet Level 1 (4 carb servings/60 grams CHO/meal)    Other Restriction(s): Fluid Restriction 1800 ML    RD to adjust diet per protocol?  Yes        02/28/23 1715                  Active Medications  Scheduled Meds:  Current Facility-Administered Medications   Medication Dose Route Frequency Provider Last Rate   • midazolam          • phenylephrine HCl          • phenylephrine HCl          • phenylephrine HCl          • acetaminophen  650 mg Oral Q6H PRN Jaymie Jha MD     • aspirin  81 mg Oral Daily Jaymie Jha MD     • atorvastatin  40 mg Oral Daily With Jeannie Corral MD     • benzonatate  100 mg Oral TID PRN Reynold Comer PA-C     • cefTRIAXone  1,000 mg Intravenous Q24H Jaymie Jha MD Stopped (02/28/23 1801)   • docusate sodium  100 mg Oral BID Wei Lomeli MD     • enoxaparin  40 mg Subcutaneous Daily Abner Lomeli MD     • insulin lispro  1-6 Units Subcutaneous TID AC Abner Lomeli MD     • insulin lispro  1-6 Units Subcutaneous HS Abner Lomeli MD     • levalbuterol  1 25 mg Nebulization Q4H PRN Jaret Lomeli MD     • metoprolol  5 mg Intravenous Once Reynold Comer PA-C     • ondansetron  4 mg Intravenous Q6H PRN Woodrow Lr MD     • verapamil  80 mg Oral Q8H [facility] Reynold Comer PA-C       Continuous Infusions:     PRN Meds:   acetaminophen, 650 mg, Q6H PRN  benzonatate, 100 mg, TID PRN  levalbuterol, 1 25 mg, Q4H PRN  ondansetron, 4 mg, Q6H PRN        Invasive Devices Review  Invasive Devices     Peripheral Intravenous Line  Duration           Peripheral IV 02/28/23 Dorsal (posterior); Right Hand <1 day    Peripheral IV 02/28/23 Left Antecubital <1 day          Drain  Duration           External Urinary Catheter <1 day                Rationale for remaining devices:   ---------------------------------------------------------------------------------------  Advance Directive and Living Will:      Power of :    POLST:    ---------------------------------------------------------------------------------------  Care Time Delivered:   Upon my evaluation, this patient had a high probability of imminent or life-threatening deterioration due to recurrent SVT, titration of drugs PNA and fever , which required my direct attention, intervention, and personal management  I have personally provided 30 minutes (0215 to 245) of critical care time, exclusive of procedures, teaching, family meetings, and any prior time recorded by providers other than myself  Reynold Comer PA-C      Portions of the record may have been created with voice recognition software  Occasional wrong word or "sound a like" substitutions may have occurred due to the inherent limitations of voice recognition software    Read the chart carefully and recognize, using context, where substitutions have occurred

## 2023-03-01 NOTE — CASE MANAGEMENT
Case Management Assessment & Discharge Planning Note    Patient name Emil Oliva  Location /-91 MRN [id number]  : 1945 Date 3/1/2023       Current Admission Date: 2023  Current Admission Diagnosis:Sepsis [facility])   Patient Active Problem List    Diagnosis Date Noted   • Thyroid nodule 2023   • Sepsis ([facility] [address] ) 2023   • SVT (supraventricular tachycardia) ([facility] [address] ) 2023   • Pneumonia 2023   • Dyspnea on exertion 2023   • Sleep apnea 2023   • Class 1 obesity due to excess calories with serious comorbidity and body mass index (BMI) of 32 0 to 32 9 in adult 2023      LOS (days): 1  Geometric Mean LOS (GMLOS) (days): 5 00  Days to GMLOS:4     OBJECTIVE:    Risk of Unplanned Readmission Score: 10 9         Current admission status: Inpatient  Referral Reason:  (Medications)    Preferred Pharmacy:   [address] [place] [address] [id number] Eduar Joshua  [facility]/ Vinayak Gonsalves [facility]/ Vinayak Gonsalves [address] [place] [id number]  Phone: [phone] Fax: [phone]    Primary Care Provider: Karl Sarmiento MD    Primary Insurance: [address] [place] [address]  Secondary Insurance:       CM met with patient at the bedside,baseline information  was obtained  CM discussed the role of CM in helping the patient develop a discharge plan and assist the patient in carry out their plan  Son and a great friend was present  ASSESSMENT:  Active Health Care Proxies    There are no active Health Care Proxies on file         Advance Directives  Does patient have a Health Care POA?: Yes  Does patient have Advance Directives?: Yes  Advance Directives: Living will (however at this time, she does not want to honor it)  Primary Contact: Nona Hoyt ( daughter) POA         Readmission Root Cause  30 Day Readmission: No    Patient Information  Admitted from[de-identified] Home  Mental Status: Alert  During Assessment patient was accompanied by: Not accompanied during assessment  Assessment information provided by[de-identified] Patient  Support Systems: Kanchan Garcia [address] of Residence[address] do you live in?: [address] entry access options   Select all that apply : Elevator  Type of Current Residence: Apartment  Floor Level: 3  In the last 12 months, was there a time when you were not able to pay the mortgage or rent on time?: No  In the last 12 months, how many places have you lived?: 1  In the last 12 months, was there a time when you did not have a steady place to sleep or slept in a shelter (including now)?: No  Homeless/housing insecurity resource given?: No  Living Arrangements: Lives Alone  Is patient a ?: No    Activities of Daily Living Prior to Admission  Completes ADLs independently?: Yes  Ambulates independently?: Yes  Does patient use assisted devices?: Yes  Assisted Devices (DME) used: Straight Cane  Does patient currently own DME?: No  Does patient have a history of Outpatient Therapy (PT/OT)?: No  Does the patient have a history of Short-Term Rehab?: No  Does patient have a history of HH?: No  [facility]?: No         Patient Information Continued  Income Source: Pension/[employer]  Does patient have prescription coverage?: Yes  Within the past 12 months, you worried that your food would run out before you got the money to buy more : Never true  Within the past 12 months, the food you bought just didn't last and you didn't have money to get more : Never true  Food insecurity resource given?: No  Does patient receive dialysis treatments?: No  Does patient have a history of substance abuse?: No  Does patient have a history of Mental Health Diagnosis?: No         Means of Transportation  Means of Transport to Appts[de-identified] Drives Self  In the past 12 months, has lack of transportation kept you from medical appointments or from getting medications?: No  In the past 12 months, has lack of transportation kept you from meetings, work, or from getting things needed for daily living?: No  Was application for public transport provided?: No        DISCHARGE DETAILS:    Discharge planning discussed with[de-identified] patient  Freedom of Choice: Yes     CM contacted family/caregiver?: Yes             Contacts  Patient Contacts: Son and friend in the room         DME Referral Provided  Referral made for DME?: No    Other Referral/Resources/Interventions Provided:  Referral Comments: none made at this time            Discharge Destination Plan[de-identified] Home  Transport at Discharge : Family            CM will continue to follow for any dc needs-- ? Needing eliquis

## 2023-03-01 NOTE — ASSESSMENT & PLAN NOTE
Pt was treated in the ER for SVT with adenosine,  IV and PO metoprolol and Cardizem,no response to Cardizem   · Patient had a rapid response called on her during the day and was transferred to level 2 SD was given metoprolol p o  25 mg and IV Lopressor  · TSH is normal, potassium was 3 8 and magnesium was 2  · After repeated labs at 830 pm -" the patient's bedside for recurrent SVT at 920 PM, patient was placed on the monitor with pads  · See if another 5 mg lopressor, 6 mg of adenosine, no good response-2 mg of IV magnesium 40 mg of IV potassium  · Patient was switched verapamil 5 mg IV and 40 mg PO q 8 hours   · Heart rate is better controlled  · Cardiology consult  · Patient may need EP consult for evaluation, he has been going into SVT frequently since 2016

## 2023-03-01 NOTE — ASSESSMENT & PLAN NOTE
Incidental finding on CT scan 4 2 cm right thyroid nodule with mass effect on the trachea    Recommend follow-up with nonemergent thyroid ultrasound

## 2023-03-01 NOTE — CASE MANAGEMENT
Case Management Discharge Planning Note    Patient name Radha Hylton  Location /-26 MRN [id number]  : 1945 Date 3/1/2023       Current Admission Date: 2023  Current Admission Diagnosis:Sepsis [facility])   Patient Active Problem List    Diagnosis Date Noted   • Thyroid nodule 2023   • Sepsis ([facility] [address] ) 2023   • SVT (supraventricular tachycardia) ([facility] [address] ) 2023   • Pneumonia 2023   • Dyspnea on exertion 2023   • Sleep apnea 2023   • Class 1 obesity due to excess calories with serious comorbidity and body mass index (BMI) of 32 0 to 32 9 in adult 2023      LOS (days): 1  Geometric Mean LOS (GMLOS) (days):   Days to GMLOS:     OBJECTIVE:  Risk of Unplanned Readmission Score: 10 75         Current admission status: Inpatient   Preferred Pharmacy:   [address] [place] [address] [id number] Angle Yo, [facility] Martha HUANG/ Vin[facility] Fonseca  [facility] [id number]  Phone: [phone] Fax: [phone]    Primary Care Provider: Alex Kim MD    Primary Insurance: [address] [place] [address]  Secondary Insurance:     DISCHARGE DETAILS:    Chart reviewed aware of medical management  Case was discussed in ICU rounds  Per team anticipate patient may be transferred to higher level of care- for EPS management  Medical necessity was completed

## 2023-03-01 NOTE — ASSESSMENT & PLAN NOTE
Patient meets sepsis criteria-likely pneumonia suspected  Is on ceftriaxone only   CTA PE is negative for PE but has bilateral lower lobe consolidation bronchiolitis versus pneumonia  Procalcitonin ending x1 negative  Patient recently had COVID in January  Respiratory protocol  Emphysematous changes on CT scan

## 2023-03-02 ENCOUNTER — HOSPITAL ENCOUNTER (INPATIENT)
Facility: HOSPITAL | Age: 78
LOS: 6 days | Discharge: HOME | End: 2023-03-08
Attending: INTERNAL MEDICINE | Admitting: INTERNAL MEDICINE

## 2023-03-02 VITALS
TEMPERATURE: 99.1 F | SYSTOLIC BLOOD PRESSURE: 121 MMHG | OXYGEN SATURATION: 92 % | RESPIRATION RATE: 27 BRPM | HEIGHT: 64 IN | DIASTOLIC BLOOD PRESSURE: 70 MMHG | HEART RATE: 106 BPM | BODY MASS INDEX: 33.08 KG/M2 | WEIGHT: 193.78 LBS

## 2023-03-02 DIAGNOSIS — I15.9 SECONDARY HYPERTENSION: ICD-10-CM

## 2023-03-02 DIAGNOSIS — I47.1 SVT (SUPRAVENTRICULAR TACHYCARDIA) (HCC): Primary | ICD-10-CM

## 2023-03-02 LAB
ANION GAP SERPL CALCULATED.3IONS-SCNC: 6 MMOL/L (ref 4–13)
BUN SERPL-MCNC: 10 MG/DL (ref 5–25)
CALCIUM SERPL-MCNC: 9 MG/DL (ref 8.4–10.2)
CHLORIDE SERPL-SCNC: 101 MMOL/L (ref 96–108)
CO2 SERPL-SCNC: 31 MMOL/L (ref 21–32)
CREAT SERPL-MCNC: 0.42 MG/DL (ref 0.6–1.3)
ERYTHROCYTE [DISTWIDTH] IN BLOOD BY AUTOMATED COUNT: 11.9 % (ref 11.6–15.1)
GFR SERPL CREATININE-BSD FRML MDRD: 99 ML/MIN/1.73SQ M
GLUCOSE SERPL-MCNC: 100 MG/DL (ref 65–140)
GLUCOSE SERPL-MCNC: 142 MG/DL (ref 65–140)
GLUCOSE SERPL-MCNC: 145 MG/DL (ref 65–140)
GLUCOSE SERPL-MCNC: 146 MG/DL (ref 65–140)
GLUCOSE SERPL-MCNC: 162 MG/DL (ref 65–140)
HCT VFR BLD AUTO: 37 % (ref 34.8–46.1)
HGB BLD-MCNC: 12.3 G/DL (ref 11.5–15.4)
MAGNESIUM SERPL-MCNC: 2.2 MG/DL (ref 1.9–2.7)
MCH RBC QN AUTO: 32.6 PG (ref 26.8–34.3)
MCHC RBC AUTO-ENTMCNC: 33.2 G/DL (ref 31.4–37.4)
MCV RBC AUTO: 98 FL (ref 82–98)
PLATELET # BLD AUTO: 333 THOUSANDS/UL (ref 149–390)
PMV BLD AUTO: 9.1 FL (ref 8.9–12.7)
POTASSIUM SERPL-SCNC: 3.9 MMOL/L (ref 3.5–5.3)
RBC # BLD AUTO: 3.77 MILLION/UL (ref 3.81–5.12)
SODIUM SERPL-SCNC: 138 MMOL/L (ref 135–147)
T4 FREE SERPL-MCNC: 1.64 NG/DL (ref 0.76–1.46)
WBC # BLD AUTO: 11.66 THOUSAND/UL (ref 4.31–10.16)

## 2023-03-02 RX ORDER — INSULIN LISPRO 100 [IU]/ML
1-6 INJECTION, SOLUTION INTRAVENOUS; SUBCUTANEOUS
Status: DISCONTINUED | OUTPATIENT
Start: 2023-03-02 | End: 2023-03-08 | Stop reason: HOSPADM

## 2023-03-02 RX ORDER — GUAIFENESIN 600 MG/1
600 TABLET, EXTENDED RELEASE ORAL EVERY 12 HOURS SCHEDULED
Status: CANCELLED | OUTPATIENT
Start: 2023-03-02

## 2023-03-02 RX ORDER — ASPIRIN 81 MG/1
81 TABLET ORAL DAILY
Status: DISCONTINUED | OUTPATIENT
Start: 2023-03-03 | End: 2023-03-08 | Stop reason: HOSPADM

## 2023-03-02 RX ORDER — LEVALBUTEROL 1.25 MG/.5ML
1.25 SOLUTION, CONCENTRATE RESPIRATORY (INHALATION) EVERY 4 HOURS PRN
Status: DISCONTINUED | OUTPATIENT
Start: 2023-03-02 | End: 2023-03-08 | Stop reason: HOSPADM

## 2023-03-02 RX ORDER — ATORVASTATIN CALCIUM 40 MG/1
40 TABLET, FILM COATED ORAL
Status: CANCELLED | OUTPATIENT
Start: 2023-03-02

## 2023-03-02 RX ORDER — ENOXAPARIN SODIUM 100 MG/ML
40 INJECTION SUBCUTANEOUS DAILY
Status: DISCONTINUED | OUTPATIENT
Start: 2023-03-03 | End: 2023-03-08 | Stop reason: HOSPADM

## 2023-03-02 RX ORDER — INSULIN LISPRO 100 [IU]/ML
1-6 INJECTION, SOLUTION INTRAVENOUS; SUBCUTANEOUS
Status: CANCELLED | OUTPATIENT
Start: 2023-03-02

## 2023-03-02 RX ORDER — AMOXICILLIN 250 MG
1 CAPSULE ORAL
Status: DISCONTINUED | OUTPATIENT
Start: 2023-03-02 | End: 2023-03-02 | Stop reason: HOSPADM

## 2023-03-02 RX ORDER — ASPIRIN 81 MG/1
81 TABLET ORAL DAILY
Status: CANCELLED | OUTPATIENT
Start: 2023-03-03

## 2023-03-02 RX ORDER — CEFTRIAXONE 1 G/50ML
1000 INJECTION, SOLUTION INTRAVENOUS EVERY 24 HOURS
Status: CANCELLED | OUTPATIENT
Start: 2023-03-02

## 2023-03-02 RX ORDER — BENZONATATE 100 MG/1
100 CAPSULE ORAL 3 TIMES DAILY PRN
Status: CANCELLED | OUTPATIENT
Start: 2023-03-02

## 2023-03-02 RX ORDER — POTASSIUM CHLORIDE 20 MEQ/1
20 TABLET, EXTENDED RELEASE ORAL ONCE
Status: COMPLETED | OUTPATIENT
Start: 2023-03-02 | End: 2023-03-02

## 2023-03-02 RX ORDER — ONDANSETRON 2 MG/ML
4 INJECTION INTRAMUSCULAR; INTRAVENOUS EVERY 6 HOURS PRN
Status: DISCONTINUED | OUTPATIENT
Start: 2023-03-02 | End: 2023-03-08 | Stop reason: HOSPADM

## 2023-03-02 RX ORDER — ATORVASTATIN CALCIUM 40 MG/1
40 TABLET, FILM COATED ORAL
Status: DISCONTINUED | OUTPATIENT
Start: 2023-03-02 | End: 2023-03-08 | Stop reason: HOSPADM

## 2023-03-02 RX ORDER — POTASSIUM CHLORIDE 14.9 MG/ML
20 INJECTION INTRAVENOUS ONCE
Status: DISCONTINUED | OUTPATIENT
Start: 2023-03-02 | End: 2023-03-02

## 2023-03-02 RX ORDER — LEVALBUTEROL INHALATION SOLUTION 1.25 MG/3ML
1.25 SOLUTION RESPIRATORY (INHALATION) EVERY 4 HOURS PRN
Status: CANCELLED | OUTPATIENT
Start: 2023-03-02

## 2023-03-02 RX ORDER — ACETAMINOPHEN 325 MG/1
650 TABLET ORAL EVERY 6 HOURS PRN
Status: DISCONTINUED | OUTPATIENT
Start: 2023-03-02 | End: 2023-03-08 | Stop reason: HOSPADM

## 2023-03-02 RX ORDER — ENOXAPARIN SODIUM 100 MG/ML
40 INJECTION SUBCUTANEOUS DAILY
Status: CANCELLED | OUTPATIENT
Start: 2023-03-03

## 2023-03-02 RX ORDER — BENZONATATE 100 MG/1
100 CAPSULE ORAL 3 TIMES DAILY PRN
Status: DISCONTINUED | OUTPATIENT
Start: 2023-03-02 | End: 2023-03-08 | Stop reason: HOSPADM

## 2023-03-02 RX ORDER — VERAPAMIL HYDROCHLORIDE 80 MG/1
80 TABLET ORAL EVERY 8 HOURS SCHEDULED
Status: CANCELLED | OUTPATIENT
Start: 2023-03-02

## 2023-03-02 RX ORDER — ONDANSETRON 2 MG/ML
4 INJECTION INTRAMUSCULAR; INTRAVENOUS EVERY 6 HOURS PRN
Status: CANCELLED | OUTPATIENT
Start: 2023-03-02

## 2023-03-02 RX ORDER — AMOXICILLIN 250 MG
1 CAPSULE ORAL
Status: CANCELLED | OUTPATIENT
Start: 2023-03-02

## 2023-03-02 RX ORDER — VERAPAMIL HYDROCHLORIDE 80 MG/1
80 TABLET ORAL EVERY 8 HOURS SCHEDULED
Status: DISCONTINUED | OUTPATIENT
Start: 2023-03-02 | End: 2023-03-03

## 2023-03-02 RX ORDER — AMOXICILLIN 250 MG
1 CAPSULE ORAL
Status: DISCONTINUED | OUTPATIENT
Start: 2023-03-02 | End: 2023-03-06

## 2023-03-02 RX ORDER — METOPROLOL TARTRATE 5 MG/5ML
5 INJECTION INTRAVENOUS EVERY 6 HOURS PRN
Status: DISCONTINUED | OUTPATIENT
Start: 2023-03-02 | End: 2023-03-08 | Stop reason: HOSPADM

## 2023-03-02 RX ORDER — GUAIFENESIN 600 MG/1
600 TABLET, EXTENDED RELEASE ORAL EVERY 12 HOURS SCHEDULED
Status: DISCONTINUED | OUTPATIENT
Start: 2023-03-02 | End: 2023-03-08 | Stop reason: HOSPADM

## 2023-03-02 RX ORDER — METOPROLOL TARTRATE 5 MG/5ML
5 INJECTION INTRAVENOUS EVERY 6 HOURS PRN
Status: CANCELLED | OUTPATIENT
Start: 2023-03-02

## 2023-03-02 RX ORDER — ACETAMINOPHEN 325 MG/1
650 TABLET ORAL EVERY 6 HOURS PRN
Status: CANCELLED | OUTPATIENT
Start: 2023-03-02

## 2023-03-02 RX ADMIN — ATORVASTATIN CALCIUM 40 MG: 40 TABLET, FILM COATED ORAL at 18:06

## 2023-03-02 RX ADMIN — GUAIFENESIN 600 MG: 600 TABLET ORAL at 09:11

## 2023-03-02 RX ADMIN — BENZONATATE 100 MG: 100 CAPSULE ORAL at 04:46

## 2023-03-02 RX ADMIN — VERAPAMIL HYDROCHLORIDE 80 MG: 80 TABLET ORAL at 05:34

## 2023-03-02 RX ADMIN — CEFTRIAXONE 1000 MG: 1 INJECTION, POWDER, FOR SOLUTION INTRAMUSCULAR; INTRAVENOUS at 18:07

## 2023-03-02 RX ADMIN — GUAIFENESIN 600 MG: 600 TABLET, EXTENDED RELEASE ORAL at 21:32

## 2023-03-02 RX ADMIN — VERAPAMIL HYDROCHLORIDE 80 MG: 80 TABLET ORAL at 21:32

## 2023-03-02 RX ADMIN — BENZONATATE 100 MG: 100 CAPSULE ORAL at 13:42

## 2023-03-02 RX ADMIN — LEVALBUTEROL HYDROCHLORIDE 1.25 MG: 1.25 SOLUTION, CONCENTRATE RESPIRATORY (INHALATION) at 19:59

## 2023-03-02 RX ADMIN — ENOXAPARIN SODIUM 40 MG: 40 INJECTION SUBCUTANEOUS at 09:11

## 2023-03-02 RX ADMIN — POTASSIUM CHLORIDE 20 MEQ: 20 TABLET, EXTENDED RELEASE ORAL at 07:25

## 2023-03-02 RX ADMIN — VERAPAMIL HYDROCHLORIDE 80 MG: 80 TABLET ORAL at 13:42

## 2023-03-02 RX ADMIN — DOCUSATE SODIUM 100 MG: 100 CAPSULE ORAL at 09:11

## 2023-03-02 RX ADMIN — ASPIRIN 81 MG: 81 TABLET, COATED ORAL at 09:11

## 2023-03-02 RX ADMIN — POTASSIUM CHLORIDE 20 MEQ: 14.9 INJECTION, SOLUTION INTRAVENOUS at 06:24
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
## 2023-03-02 NOTE — DISCHARGE SUMMARY
[address]  Discharge- Ishmael Hazard 1945, 68 y o  female MRN: [id number]  Unit/Bed#: -01 Encounter: [id number]  Primary Care Provider: Shandra Perez MD   Date and time admitted to hospital: 2/28/2023 10:38 AM    Thyroid nodule  Assessment & Plan  Incidental finding on CT scan 4 2 cm right thyroid nodule with mass effect on the trachea  Recommend follow-up with nonemergent thyroid ultrasound    Class 1 obesity due to excess calories with serious comorbidity and body mass index (BMI) of 32 0 to 32 9 in adult  Assessment & Plan  nutrional information    Sleep apnea  Assessment & Plan  Continue CPAP q hs        Dyspnea on exertion  Assessment & Plan  Patient feels volume up   Echocardiogram shows EF 50% with no significant abnormalities similar to prior echo in 2019  Patient had a cardiac cath in 2016 which was negative, he also had a stress test in 2016 and 2019, patient SVT history is since 2016     BNP is elevated at 202  Liter of IV fluid bolus and 250 mls albumin given 3/1  CTA of the chest was negative for PE, pneumonia postive   Most likely mild volume overload with bilateral lower extremity swelling, orthopnea dyspnea on exertion    Pneumonia  Assessment & Plan  · Started on ceftriaxone D3  at Had 2 doses of azithromycin outpatient   · Continue Mucinex  · Strep and legionella negative  · AB are pending   · Blood cultures NGTD  · COVID flu and RSV is negative  · Respiratory protocol   · Sputum cultures with Gram + cocci  · Fever treated with tylenol       SVT (supraventricular tachycardia) ([facility] [address] )  Assessment & Plan  Pt was treated in the ER for SVT with adenosine,  IV and PO metoprolol and Cardizem,no response to Cardizem   · Patient had a rapid response called on her during the day and was transferred to level 2 SD was given metoprolol p o  25 mg and IV Lopressor  · TSH is normal, potassium was 3 8 and magnesium was 2  · After repeated labs at 830 pm -" the patient's bedside for recurrent SVT at 920 PM, patient was placed on the monitor with pads  · See if another 5 mg lopressor, 6 mg of adenosine, no good response-2 mg of IV magnesium 40 mg of IV potassium  · Patient was switched verapamil 5 mg IV and 40 mg PO q 8 hours   · Heart rate is better controlled  · Cardiology consult  · Patient may need EP consult for evaluation, he has been going into SVT frequently since 2016  · Increase verapamil to 80 mg PO q 8 hours    · Patient being transferred to [facility] for EP eval    * Sepsis [facility])  Assessment & Plan  Patient meets sepsis criteria-likely pneumonia suspected  Is on ceftriaxone only   CTA PE is negative for PE but has bilateral lower lobe consolidation bronchiolitis versus pneumonia  Procalcitonin ending x1 negative  Patient recently had COVID in January  Respiratory protocol  Emphysematous changes on CT scan               Medical Problems     Resolved Problems  Date Reviewed: 3/2/2023   None         Admission Date:   Admission Orders (From admission, onward)     Ordered        02/28/23 [facility]  Once                        Admitting Diagnosis: Atrial flutter ([address] ) [I48 92]  Palpitations [R00 2]  SVT (supraventricular tachycardia) ([address] ) [I47 1]  Rapid heart rate [R00 0]  Pulmonary vascular congestion [R09 89]    HPI:   As per Yamilet Scanlon PA-C's note," Patient is a 80-year-old with a medical history of SVT, diabetes and hypertension, who was diagnosed in 2016 with SVT had a Cardiac cath done and 2 stress test - clean  Patient was seen in January 2023 in the ER with episodes of palpitations and dizziness, tested positive for COVID, then discharged with recommended proceeding cardiologist for elevated heart rate  Upcoming appointment is next week  2 days ago she went to her primary care doctor due to persistent coughing and fever, at the primary care office her heart rate was 140 she had a temperature of 101 8 persistent cough    He was started on azithromycin and Tessalon Perles  Patient the next day had a heart rate into the 160s and called her primary care office which stated to go to the ER  In the ER patient received 1 L of fluid, adenosine IV and p o  Lopressor and cardizem  Was admitted as MedSurg, response was called and patient was transferred to [facility] 2  I was called approximately 930pm patient going in and out of SVT, rates up to 220, pt was placed on monitor with pads on  Labs were ordered for repeat and pt's electrolytes were replenished  Patient received 40 IV potassium and 2 g of magnesium, received another dose of Lopressor and adenosine with no response to episodic increase of heart rate to 220  He had 1 episode of a widened complex, which resolved after lopressor 5 mg IV, patient remained to go in and out of SVT, she remained awake but was able to tell when episodes happened  Switched medications to verapamil 5 mg IV and then started on verapamil 40 mg PO q 8 hours  Pt seemed to be more tolerant  Pt to remain on pads overnight     Procedures Performed:   Orders Placed This Encounter   Procedures   • ED ECG Documentation Only       Summary of Hospital Course:   She was transferred to the ICU to [facility] for further monitoring of her SVT she had 2 more episodes of SVT for which she required an additional 2 doses of adenosine and IV Lopressor to bradycardia  Cardiology was consulted  It was determined the patient appeared to be having considered atrial tachycardia with aberrancy  Cardiology recommended transfer to [facility] for EP evaluation  She remained in normal sinus rhythm without any further episodes of SVT  She was transferred today to [facility] in stable condition      Significant Findings, Care, Treatment and Services Provided:   2/28 CTA - Tree-in-bud nodularity in the dependent portion of both upper lobes and both lower lobes with mild bilateral lower lobe consolidation compatible with bronchiolitis / pneumonia, 4 2 cm thyroid nodule     3/1 Echo EF 50%  NRWA    Complications:NA    Condition at Discharge: stable         Discharge instructions/Information to patient and family:   See after visit summary for information provided to patient and family  Provisions for Follow-Up Care:  See after visit summary for information related to follow-up care and any pertinent home health orders  PCP: Shandra Perez MD    Disposition: transferred to [facility]    Planned Readmission: Yes patient     Discharge Statement   I spent 15 minutes discharging the patient  This time was spent on the day of discharge  I had direct contact with the patient on the day of discharge  Additional documentation is required if more than 30 minutes were spent on discharge  Discharge Medications:  See after visit summary for reconciled discharge medications provided to patient and family

## 2023-03-02 NOTE — ASSESSMENT & PLAN NOTE
Patient feels volume up   Echocardiogram shows EF 50% with no significant abnormalities similar to prior echo in 2019  Patient had a cardiac cath in 2016 which was negative, he also had a stress test in 2016 and 2019, patient SVT history is since 2016     BNP is elevated at 202  Liter of IV fluid bolus and 250 mls albumin given 3/1  CTA of the chest was negative for PE, pneumonia postive   Most likely mild volume overload with bilateral lower extremity swelling, orthopnea dyspnea on exertion

## 2023-03-02 NOTE — H&P
[address] [facility]  H&P- Abel Fuentes 1945, 68 y o  female MRN: [id number]  Unit/Bed#: [facility] 529-01 Encounter: [id number]  Primary Care Provider: Sedrick Burger MD   Date and time admitted to hospital: 3/2/2023  4:53 PM    * SVT (supraventricular tachycardia) ([facility] [address] )  Assessment & Plan  Pt was treated in the ER for SVT with adenosine,  IV and PO metoprolol and Cardizem,no response to Cardizem   • Patient had a rapid response called on her during the day and was transferred to level 2 SD was given metoprolol p o  25 mg and IV Lopressor  • TSH is normal, potassium was 3 8 and magnesium was 2  • After repeated labs at 830 pm -" the patient's bedside for recurrent SVT at 920 PM, patient was placed on the monitor with pads  • See if another 5 mg lopressor, 6 mg of adenosine, no good response-2 mg of IV magnesium 40 mg of IV potassium  • Patient was switched verapamil 5 mg IV and 40 mg PO q 8 hours   • Heart rate is better controlled  • EP consult for evaluation, he has been going into SVT frequently since 2016  • continue verapamil to 80 mg PO q 8 hours      Sepsis ([facility] )  Assessment & Plan  · Patient meets sepsis criteria-likely pneumonia suspected  · Is on ceftriaxone day 3  · CTA PE is negative for PE but has bilateral lower lobe consolidation bronchiolitis versus pneumonia  · Procalcitonin ending x1 negative  · Patient recently had COVID in January  · Respiratory protocol  · Emphysematous changes on CT scan     Pneumonia  Assessment & Plan  • Started on ceftriaxone D3  at Had 2 doses of azithromycin outpatient   • Continue Mucinex  • Strep and legionella negative  • Sputum culture is pending  • Blood cultures are pending   • COVID flu and RSV is negative  • Respiratory protocol   • Fever treated with tylenol     Dyspnea on exertion  Assessment & Plan  · Echocardiogram Systolic function is low normal  Wall motion is normal with the exception of abnormal septal motion   Diastolic function is mildly abnormal, consistent with grade I   · Patient had a cardiac cath in 2016 which was negative, he also had a stress test in 2016 and 2019, patient SVT history is since 2016  · CTA of the chest was negative for PE and pulmonary edema, pneumonia postive   · Likely due to pneumonia  · Monitor volume status closely  Sleep apnea  Assessment & Plan  Patient supposed to wear CPAP  Hold due to sputum production   Class 1 obesity due to excess calories with serious comorbidity and body mass index (BMI) of 32 0 to 32 9 in adult  Assessment & Plan  · Affects all aspects of her care  · Weight loss counseling when stable as an outpatient    Thyroid nodule  Assessment & Plan  Incidental finding on CT scan 4 2 cm right thyroid nodule with mass effect on the trachea   Recommend follow-up with nonemergent thyroid ultrasound    VTE Pharmacologic Prophylaxis:   Moderate Risk (Score 3-4) - Pharmacological DVT Prophylaxis Ordered: enoxaparin (Lovenox)  Code Status: Level 1 - Full Code   Discussion with family: Patient declined call to   Anticipated Length of Stay: Patient will be admitted on an inpatient basis with an anticipated length of stay of greater than 2 midnights secondary to needs EP evaluation  Total Time Spent on Date of Encounter in care of patient: 55 minutes This time was spent on one or more of the following: performing physical exam; counseling and coordination of care; obtaining or reviewing history; documenting in the medical record; reviewing/ordering tests, medications or procedures; communicating with other healthcare professionals and discussing with patient's family/caregivers  Chief Complaint: cough and fever  History of Present Illness:  Lelia Hawkins is a 68 y o  female with a PMH of SVT who presents   due to persistent coughing and fever, at the primary care office her heart rate was 140 she had a temperature of 101 8 persistent cough   He was started on azithromycin and Tessalon Perles   Patient the next day had a heart rate into the 160s and called her primary care office which stated to go to the ER   In the ER patient received 1 L of fluid, adenosine IV and noelle o  Zainab Carr admitted as MedSurg, response was called and patient was transferred to [place] [address]  At approximately 930pm patient going in and out of SVT, rates up to 220, pt was placed on monitor with pads on  Labs were ordered for repeat and pt's electrolytes were replenished  Patient received 40 IV potassium and 2 g of magnesium, received another dose of Lopressor and adenosine with no response to episodic increase of heart rate to 220  He had 1 episode of a widened complex, which resolved after lopressor 5 mg IV, patient remained to go in and out of SVT, she remained awake but was able to tell when episodes happened  Switched medications to verapamil 5 mg IV and then started on verapamil 40 mg PO q 8 hours  She was transferred to the ICU to [facility] for further monitoring of her SVT she had 2 more episodes of SVT for which she required an additional 2 doses of adenosine and IV Lopressor to bradycardia  Cardiology was consulted  It was determined the patient appeared to be having considered atrial tachycardia with aberrancy  Cardiology recommended transfer to [facility] for EP evaluation  She remained in normal sinus rhythm without any further episodes of SVT  She was transferred today to [facility] in stable condition  Review of Systems:  Review of Systems   All other systems reviewed and are negative        Past Medical and Surgical History:   Past Medical History:   Diagnosis Date   • Diabetes mellitus ([facility] [address] )    • Hyperlipidemia    • Hypertension    • ABBY (obstructive sleep apnea)     on CPAP   • SVT (supraventricular tachycardia) ([facility] [address] )        Past Surgical History:   Procedure Laterality Date   • BREAST SURGERY     •  SECTION     • NOSE SURGERY • TONSILLECTOMY         Meds/Allergies:  Prior to Admission medications    Medication Sig Start Date End Date Taking? Authorizing Provider   aspirin (ECOTRIN LOW STRENGTH) 81 mg EC tablet Take 81 mg by mouth daily   Yes Historical Provider, MD   benzonatate (TESSALON PERLES) 100 mg capsule Take 100 mg by mouth 3 (three) times a day as needed for cough    Historical Provider, MD   Cholecalciferol 125 MCG (5000 UT) capsule Take 1 tablet by mouth daily    Historical Provider, MD   losartan (COZAAR) 25 mg tablet Take 25 mg by mouth 2 (two) times a day    Historical Provider, MD   Melatonin 5 MG CAPS Take by mouth daily at bedtime    Historical Provider, MD   metFORMIN (GLUCOPHAGE) 500 mg tablet Take 500 mg by mouth daily    Historical Provider, MD   metoprolol tartrate (LOPRESSOR) 25 mg tablet Take 75 mg by mouth 2 (two) times a day    Historical Provider, MD   Multiple Vitamins-Minerals (CENTRUM SILVER PO) Take by mouth in the morning    Historical Provider, MD   rosuvastatin (CRESTOR) 5 mg tablet Take 5 mg by mouth daily Monday, Wednesday, Friday    Historical Provider, MD     I have reviewed home medications using recent Epic encounter  Allergies: Allergies   Allergen Reactions   • Lisinopril Cough     ace cough  ace cough    Ace-cough   • Naproxen Palpitations   • Pseudoephedrine Palpitations       Social History:  Marital Status:    Occupation: None listed  Patient Pre-hospital Living Situation: Home  Patient Pre-hospital Level of Mobility: walks  Patient Pre-hospital Diet Restrictions: None  Substance Use History:   Social History     Substance and Sexual Activity   Alcohol Use Not Currently     Social History     Tobacco Use   Smoking Status Former   • Types: Cigarettes   • Quit date: 1989   • Years since quittin 1   Smokeless Tobacco Never     Social History     Substance and Sexual Activity   Drug Use Never       Family History:  No family history on file      Physical Exam:     Vitals: Blood Pressure: 133/63 (03/02/23 1710)  Pulse: 101 (03/02/23 1710)  Temperature: 99 °F (37 2 °C) (03/02/23 1710)  Respirations: 17 (03/02/23 1710)  Height: 5' 4" (162 6 cm) (03/02/23 1658)  Weight - Scale: 86 4 kg (190 lb 7 6 oz) (03/02/23 1658)  SpO2: 93 % (03/02/23 1710)    Physical Exam  Constitutional:       Appearance: Normal appearance  HENT:      Head: Normocephalic and atraumatic  Nose: Nose normal    Eyes:      Extraocular Movements: Extraocular movements intact  Cardiovascular:      Rate and Rhythm: Normal rate and regular rhythm  Pulmonary:      Effort: Pulmonary effort is normal       Breath sounds: Wheezing present  No rales  Abdominal:      Palpations: Abdomen is soft  Musculoskeletal:      Right lower leg: No edema  Left lower leg: No edema  Neurological:      General: No focal deficit present  Mental Status: She is alert and oriented to person, place, and time     Psychiatric:         Mood and Affect: Mood normal          Behavior: Behavior normal           Additional Data:     Lab Results:  Results from last 7 days   Lab Units 03/02/23  0530 03/01/23  0513   WBC Thousand/uL 11 66* 10 46*   HEMOGLOBIN g/dL 12 3 12 7   HEMATOCRIT % 37 0 38 8   PLATELETS Thousands/uL 333 318   NEUTROS PCT %  --  60   LYMPHS PCT %  --  20   MONOS PCT %  --  16*   EOS PCT %  --  2     Results from last 7 days   Lab Units 03/02/23  0530 03/01/23  0513   SODIUM mmol/L 138 136   POTASSIUM mmol/L 3 9 4 0   CHLORIDE mmol/L 101 101   CO2 mmol/L 31 29   BUN mg/dL 10 14   CREATININE mg/dL 0 42* 0 48*   ANION GAP mmol/L 6 6   CALCIUM mg/dL 9 0 8 8   ALBUMIN g/dL  --  3 8   TOTAL BILIRUBIN mg/dL  --  0 53   ALK PHOS U/L  --  57   ALT U/L  --  15   AST U/L  --  20   GLUCOSE RANDOM mg/dL 162* 136     Results from last 7 days   Lab Units 02/28/23  1053   INR  1 02     Results from last 7 days   Lab Units 03/02/23  1741 03/02/23  1129 03/02/23  0707 03/01/23  2135 03/01/23  1658 03/01/23  1131 03/01/23  0745 02/28/23  2201 02/28/23  1808   POC GLUCOSE mg/dl 100 145* 142* 140 115 117 152* 147* 165*     Results from last 7 days   Lab Units 02/28/23  1809   HEMOGLOBIN A1C % 6 3*     Results from last 7 days   Lab Units 03/01/23  0513 02/28/23  1809 02/28/23  1053   LACTIC ACID mmol/L  --  0 9  --    PROCALCITONIN ng/ml 0 13  --  0 10       Lines/Drains:  Invasive Devices     Peripheral Intravenous Line  Duration           Peripheral IV 02/28/23 Dorsal (posterior); Right Hand 1 day    Peripheral IV 03/02/23 Right Antecubital <1 day                    Imaging: No pertinent imaging reviewed  No orders to display       EKG and Other Studies Reviewed on Admission:   · EKG: NSR    ** Please Note: This note has been constructed using a voice recognition system   **

## 2023-03-02 NOTE — ASSESSMENT & PLAN NOTE
· Echocardiogram Systolic function is low normal  Wall motion is normal with the exception of abnormal septal motion  Diastolic function is mildly abnormal, consistent with grade I   · Patient had a cardiac cath in 2016 which was negative, he also had a stress test in 2016 and 2019, patient SVT history is since 2016  · CTA of the chest was negative for PE and pulmonary edema, pneumonia postive   · Likely due to pneumonia  · Monitor volume status closely

## 2023-03-02 NOTE — ASSESSMENT & PLAN NOTE
• Started on ceftriaxone D3  at Had 2 doses of azithromycin outpatient   • Continue Mucinex  • Strep and legionella negative  • Sputum culture is pending  • Blood cultures are pending   • COVID flu and RSV is negative  • Respiratory protocol   • Fever treated with tylenol

## 2023-03-02 NOTE — ASSESSMENT & PLAN NOTE
· Patient meets sepsis criteria-likely pneumonia suspected  · Is on ceftriaxone day 3  · CTA PE is negative for PE but has bilateral lower lobe consolidation bronchiolitis versus pneumonia  · Procalcitonin ending x1 negative  · Patient recently had COVID in January  · Respiratory protocol  · Emphysematous changes on CT scan

## 2023-03-02 NOTE — ASSESSMENT & PLAN NOTE
Pt was treated in the ER for SVT with adenosine,  IV and PO metoprolol and Cardizem,no response to Cardizem   • Patient had a rapid response called on her during the day and was transferred to level 2 SD was given metoprolol p o  25 mg and IV Lopressor  • TSH is normal, potassium was 3 8 and magnesium was 2  • After repeated labs at 830 pm -" the patient's bedside for recurrent SVT at 920 PM, patient was placed on the monitor with pads  • See if another 5 mg lopressor, 6 mg of adenosine, no good response-2 mg of IV magnesium 40 mg of IV potassium  • Patient was switched verapamil 5 mg IV and 40 mg PO q 8 hours   • Heart rate is better controlled  • EP consult for evaluation, he has been going into SVT frequently since 2016    • continue verapamil to 80 mg PO q 8 hours

## 2023-03-02 NOTE — ASSESSMENT & PLAN NOTE
· Started on ceftriaxone D3  at Had 2 doses of azithromycin outpatient   · Continue Mucinex  · Strep and legionella negative  · AB are pending   · Blood cultures NGTD  · COVID flu and RSV is negative  · Respiratory protocol   · Sputum cultures with Gram + cocci  · Fever treated with tylenol

## 2023-03-02 NOTE — ASSESSMENT & PLAN NOTE
Pt was treated in the ER for SVT with adenosine,  IV and PO metoprolol and Cardizem,no response to Cardizem   · Patient had a rapid response called on her during the day and was transferred to level 2 SD was given metoprolol p o  25 mg and IV Lopressor  · TSH is normal, potassium was 3 8 and magnesium was 2  · After repeated labs at 830 pm -" the patient's bedside for recurrent SVT at 920 PM, patient was placed on the monitor with pads  · See if another 5 mg lopressor, 6 mg of adenosine, no good response-2 mg of IV magnesium 40 mg of IV potassium  · Patient was switched verapamil 5 mg IV and 40 mg PO q 8 hours   · Heart rate is better controlled  · Cardiology consult  · Patient may need EP consult for evaluation, he has been going into SVT frequently since 2016    · Increase verapamil to 80 mg PO q 8 hours    · Patient being transferred to [facility] for EP eval

## 2023-03-02 NOTE — PROGRESS NOTES
Progress Note:Cardiology  Bianka Hahn 1945, 68 y o  female MRN: [id number]    Unit/Bed#: -01 Encounter: [id number]  Attending Physician: Caryle Must, MD   Primary Care Provider: Fady Valenzuela MD   Date admitted to hospital: 2/28/2023  Length of stay: 2         SVT (supraventricular tachycardia) [facility])  Assessment & Plan  History of paroxysmal SVT dating back to 2016  Has been on metoprolol tartrate managed by [address] [place] [address] Cardiology  Presents with increasing frequency of SVT episodes  Echocardiogram today shows EF 50% with no significant abnormalities similar to prior echo in 2019  Demonstrates episodes of SVT/AT with rates > 200 during this admission with temporary response to IV adenosine and IV Lopressor  Did not respond to IV Cardiazem  Also with episodes of WCT  HR's improved to low 100's with verapamil 80 mg TID  Dr Marie Martinez discussed case with Dr Trinidad Agrawal with [facility] EP with plan for transfer to [facility] for EP consult  Continue telemetry  Optimize electrolytes for K+ > 4, Mag > 2  Thyroid nodule  Assessment & Plan  TSH 0 9 during this admission    Sleep apnea  Assessment & Plan  Continue use of CPAP    Dyspnea on exertion  Assessment & Plan  Likely multi-factoral in the setting of pneumonia and atrial tachycardia  See discussion under SVT and pneumonia  Pneumonia  Assessment & Plan  Noted on chest imaging  Recent Covid infection 1/5/2023  Management as per critical care team    Likely contributing to rapid rates  * Sepsis [facility])  Assessment & Plan  Secondary to pneumonia  Management as per ICU team       Subjective:   Patient seen and examined  No significant events overnight  She is ambulating in her hospital room without difficulty  Denies chest pain, shortness of breath, lightheadedness  Required one dose of IV Lopressor for rapid rates last evening  Review of Systems   Constitutional: Negative  HENT: Negative      Cardiovascular: Negative for chest pain, dyspnea on exertion, irregular heartbeat, leg swelling, near-syncope, orthopnea and palpitations  Respiratory: Positive for cough  Negative for shortness of breath and snoring  Endocrine: Negative  Skin: Negative  Musculoskeletal: Negative  Gastrointestinal: Negative  Genitourinary: Negative  Neurological: Negative  Psychiatric/Behavioral: Negative  Objective:     Vitals: Blood pressure 110/58, pulse 105, temperature 98 4 °F (36 9 °C), temperature source Oral, resp  rate 22, height 5' 4" (1 626 m), weight 87 9 kg (193 lb 12 6 oz), SpO2 94 %  , Body mass index is 33 26 kg/m² ,     Orthostatic Blood Pressures    Flowsheet Row Most Recent Value   Blood Pressure 110/58 filed at 03/02/2023 0700   Patient Position - Orthostatic VS Lying filed at 03/02/2023 0700          Physical Exam  Vitals and nursing note reviewed  Constitutional:       General: She is not in acute distress  Appearance: She is well-developed  HENT:      Head: Normocephalic and atraumatic  Eyes:      Conjunctiva/sclera: Conjunctivae normal    Cardiovascular:      Rate and Rhythm: Regular rhythm  Tachycardia present  Heart sounds: No murmur heard  Pulmonary:      Effort: Pulmonary effort is normal  No respiratory distress  Breath sounds: Decreased breath sounds and wheezing present  Comments: Breathing comfortably on room air  Abdominal:      Palpations: Abdomen is soft  Tenderness: There is no abdominal tenderness  Musculoskeletal:         General: No swelling  Cervical back: Neck supple  Right lower leg: No edema  Left lower leg: No edema  Skin:     General: Skin is warm and dry  Capillary Refill: Capillary refill takes less than 2 seconds  Neurological:      Mental Status: She is alert  Psychiatric:         Mood and Affect: Mood and affect normal          Speech: Speech normal          Behavior: Behavior normal  Behavior is cooperative           Cognition and Memory: Cognition and memory normal             Intake/Output Summary (Last 24 hours) at 3/2/2023 1038  Last data filed at 3/2/2023 0715  Gross per 24 hour   Intake 902 ml   Output 1600 ml   Net -698 ml       Weight (last 2 days)     Date/Time Weight    03/02/23 0444 87 9 (193 78)    03/01/23 0700 89 4 (197)    03/01/23 0453 89 7 (197 75)    02/28/23 1457 86 2 (190)    02/28/23 1045 85 7 (189)             Medications:      Current Facility-Administered Medications:   •  acetaminophen (TYLENOL) tablet 650 mg, 650 mg, Oral, Q6H PRN, Edmond Gill MD, 650 mg at 02/28/23 2013  •  aspirin (ECOTRIN LOW STRENGTH) EC tablet 81 mg, 81 mg, Oral, Daily, Edmond Gill MD, 81 mg at 03/02/23 0911  •  atorvastatin (LIPITOR) tablet 40 mg, 40 mg, Oral, Daily With Kenji Palacios MD, 40 mg at 03/01/23 1658  •  benzonatate (TESSALON PERLES) capsule 100 mg, 100 mg, Oral, TID PRN, Reynold Comer PA-C, 100 mg at 03/02/23 0446  •  cefTRIAXone (ROCEPHIN) IVPB (premix in dextrose) 1,000 mg 50 mL, 1,000 mg, Intravenous, Q24H, Edmond Gill MD, Stopped at 03/01/23 1732  •  enoxaparin (LOVENOX) subcutaneous injection 40 mg, 40 mg, Subcutaneous, Daily, Breanne Lomeli MD, 40 mg at 03/02/23 0911  •  guaiFENesin (MUCINEX) 12 hr tablet 600 mg, 600 mg, Oral, Y26M [facility], Juan Murray MD, 551 mg at 03/02/23 0911  •  insulin lispro (HumaLOG) 100 units/mL subcutaneous injection 1-6 Units, 1-6 Units, Subcutaneous, TID AC, 1 Units at 03/01/23 0825 **AND** Fingerstick Glucose (POCT), , , TID AC, Abner Lomeli MD  •  insulin lispro (HumaLOG) 100 units/mL subcutaneous injection 1-6 Units, 1-6 Units, Subcutaneous, HS, Abner Lomeli MD  •  levalbuterol (Meenakshi Dago) inhalation solution 1 25 mg, 1 25 mg, Nebulization, Q4H PRN, Breanne Lomeli MD, 1 25 mg at 03/01/23 2004  •  metoprolol (LOPRESSOR) injection 5 mg, 5 mg, Intravenous, Q6H PRN, VIVI Ferrer, 5 mg at 03/01/23 2044  •  ondansetron (ZOFRAN) injection 4 mg, 4 mg, Intravenous, Q6H PRN, Agapito Corona MD  •  senna-docusate sodium (SENOKOT S) 8 6-50 mg per tablet 1 tablet, 1 tablet, Oral, HS, VIVI Ferrer  •  verapamil (CALAN) tablet 80 mg, 80 mg, Oral, Q8H XAVIER, Reynold Comer PA-C, 80 mg at 03/02/23 0534     Labs & Results:        Results from last 7 days   Lab Units 03/02/23 0530 03/01/23 0513 02/28/23  1053   WBC Thousand/uL 11 66* 10 46* 13 00*   HEMOGLOBIN g/dL 12 3 12 7 14 3   HEMATOCRIT % 37 0 38 8 43 6   PLATELETS Thousands/uL 333 318 351     Results from last 7 days   Lab Units 03/01/23  0513   TRIGLYCERIDES mg/dL 96   HDL mg/dL 42*     Results from last 7 days   Lab Units 03/02/23 0530 03/01/23 0513 02/28/23 2050 02/28/23  1053   POTASSIUM mmol/L 3 9 4 0 3 6 3 8   CHLORIDE mmol/L 101 101 101 100   CO2 mmol/L 31 29 27 25   BUN mg/dL 10 14 20 20   CREATININE mg/dL 0 42* 0 48* 0 59* 0 61   CALCIUM mg/dL 9 0 8 8 9 0 10 0   ALK PHOS U/L  --  57  --  63   ALT U/L  --  15  --  14   AST U/L  --  20  --  24     Results from last 7 days   Lab Units 02/28/23  1053   INR  1 02   PTT seconds 29     Results from last 7 days   Lab Units 03/02/23 0530 03/01/23  0513 02/28/23 2050   MAGNESIUM mg/dL 2 2 2 8* 1 8*     Results from last 7 days   Lab Units 02/28/23  1053   BNP pg/mL 202*      Telemetry: ST/AT rate 's  Occasional PAC's  Rapid rates around 8-9 pm 3/1/23, no events overnight  Counseling / Coordination of Care  Total floor / unit time spent today 25 minutes  Greater than 50% of total time was spent with the patient and / or family counseling and / or coordination of care  A description of the counseling / coordination of care: Discussed case with critical care

## 2023-03-03 LAB
ANION GAP SERPL CALCULATED.3IONS-SCNC: 6 MMOL/L (ref 4–13)
BACTERIA SPT RESP CULT: ABNORMAL
BACTERIA SPT RESP CULT: ABNORMAL
BUN SERPL-MCNC: 14 MG/DL (ref 5–25)
CALCIUM SERPL-MCNC: 9.8 MG/DL (ref 8.3–10.1)
CHLORIDE SERPL-SCNC: 102 MMOL/L (ref 96–108)
CO2 SERPL-SCNC: 28 MMOL/L (ref 21–32)
CREAT SERPL-MCNC: 0.47 MG/DL (ref 0.6–1.3)
ERYTHROCYTE [DISTWIDTH] IN BLOOD BY AUTOMATED COUNT: 11.8 % (ref 11.6–15.1)
GFR SERPL CREATININE-BSD FRML MDRD: 95 ML/MIN/1.73SQ M
GLUCOSE SERPL-MCNC: 109 MG/DL (ref 65–140)
GLUCOSE SERPL-MCNC: 140 MG/DL (ref 65–140)
GLUCOSE SERPL-MCNC: 158 MG/DL (ref 65–140)
GLUCOSE SERPL-MCNC: 160 MG/DL (ref 65–140)
GLUCOSE SERPL-MCNC: 160 MG/DL (ref 65–140)
GRAM STN SPEC: ABNORMAL
HCT VFR BLD AUTO: 38.9 % (ref 34.8–46.1)
HGB BLD-MCNC: 13 G/DL (ref 11.5–15.4)
MAGNESIUM SERPL-MCNC: 2.4 MG/DL (ref 1.6–2.6)
MCH RBC QN AUTO: 32.7 PG (ref 26.8–34.3)
MCHC RBC AUTO-ENTMCNC: 33.4 G/DL (ref 31.4–37.4)
MCV RBC AUTO: 98 FL (ref 82–98)
PHOSPHATE SERPL-MCNC: 3.4 MG/DL (ref 2.3–4.1)
PLATELET # BLD AUTO: 424 THOUSANDS/UL (ref 149–390)
PMV BLD AUTO: 9.2 FL (ref 8.9–12.7)
POTASSIUM SERPL-SCNC: 4 MMOL/L (ref 3.5–5.3)
RBC # BLD AUTO: 3.98 MILLION/UL (ref 3.81–5.12)
SODIUM SERPL-SCNC: 136 MMOL/L (ref 135–147)
WBC # BLD AUTO: 11.4 THOUSAND/UL (ref 4.31–10.16)

## 2023-03-03 RX ORDER — ADENOSINE 3 MG/ML
INJECTION, SOLUTION INTRAVENOUS
Status: DISPENSED
Start: 2023-03-03 | End: 2023-03-04

## 2023-03-03 RX ORDER — METOPROLOL TARTRATE 50 MG/1
50 TABLET, FILM COATED ORAL EVERY 12 HOURS SCHEDULED
Status: DISCONTINUED | OUTPATIENT
Start: 2023-03-03 | End: 2023-03-04

## 2023-03-03 RX ADMIN — METOPROLOL TARTRATE 50 MG: 50 TABLET, FILM COATED ORAL at 22:01

## 2023-03-03 RX ADMIN — BENZONATATE 100 MG: 100 CAPSULE ORAL at 13:05

## 2023-03-03 RX ADMIN — METOROPROLOL TARTRATE 5 MG: 5 INJECTION, SOLUTION INTRAVENOUS at 12:43

## 2023-03-03 RX ADMIN — ATORVASTATIN CALCIUM 40 MG: 40 TABLET, FILM COATED ORAL at 16:47

## 2023-03-03 RX ADMIN — ENOXAPARIN SODIUM 40 MG: 40 INJECTION SUBCUTANEOUS at 08:58

## 2023-03-03 RX ADMIN — CEFTRIAXONE SODIUM 1000 MG: 10 INJECTION, POWDER, FOR SOLUTION INTRAVENOUS at 16:51

## 2023-03-03 RX ADMIN — ASPIRIN 81 MG: 81 TABLET, COATED ORAL at 08:58

## 2023-03-03 RX ADMIN — INSULIN LISPRO 1 UNITS: 100 INJECTION, SOLUTION INTRAVENOUS; SUBCUTANEOUS at 08:56

## 2023-03-03 RX ADMIN — VERAPAMIL HYDROCHLORIDE 80 MG: 80 TABLET ORAL at 05:40

## 2023-03-03 RX ADMIN — GUAIFENESIN 600 MG: 600 TABLET, EXTENDED RELEASE ORAL at 08:58

## 2023-03-03 RX ADMIN — METOPROLOL TARTRATE 50 MG: 50 TABLET, FILM COATED ORAL at 12:48

## 2023-03-03 RX ADMIN — GUAIFENESIN 600 MG: 600 TABLET, EXTENDED RELEASE ORAL at 21:59

## 2023-03-03 RX ADMIN — SENNOSIDES AND DOCUSATE SODIUM 1 TABLET: 8.6; 5 TABLET ORAL at 21:59

## 2023-03-03 RX ADMIN — INSULIN LISPRO 1 UNITS: 100 INJECTION, SOLUTION INTRAVENOUS; SUBCUTANEOUS at 16:48

## 2023-03-03 NOTE — ASSESSMENT & PLAN NOTE
· Echocardiogram Systolic function is low normal  Wall motion is normal with the exception of abnormal septal motion  Diastolic function is mildly abnormal, consistent with grade I   · Patient had a cardiac cath in 2016 which was negative, he also had a stress test in 2016 and 2019, patient SVT history is since 2016     · CTA of the chest was negative for PE and pulmonary edema, pneumonia postive   · Likely due to pneumonia  · Improved with treatment; continue to monitor

## 2023-03-03 NOTE — RESPIRATORY THERAPY NOTE
RT Protocol Note  Lelia Hawkins 68 y o  female MRN: [id number]  [facility] Encounter: [id number]    Assessment    Principal Problem:    SVT (supraventricular tachycardia) (HCC)  Active Problems:    Sepsis ([facility] )    Pneumonia    Dyspnea on exertion    Sleep apnea    Class 1 obesity due to excess calories with serious comorbidity and body mass index (BMI) of 32 0 to 32 9 in adult    Thyroid nodule      Home Pulmonary Medications:  none  Home Devices/Therapy: BiPAP/CPAP    Past Medical History:   Diagnosis Date    Diabetes mellitus ([facility] )     Hyperlipidemia     Hypertension     ABBY (obstructive sleep apnea)     on CPAP    SVT (supraventricular tachycardia) ([facility] )      Social History     Socioeconomic History    Marital status:      Spouse name: Not on file    Number of children: Not on file    Years of education: Not on file    Highest education level: Not on file   Occupational History    Not on file   Tobacco Use    Smoking status: Former     Types: Cigarettes     Quit date: 1989     Years since quittin 1    Smokeless tobacco: Never   Vaping Use    Vaping Use: Never used   Substance and Sexual Activity    Alcohol use: Not Currently    Drug use: Never    Sexual activity: Not on file     Comment: defer   Other Topics Concern    Not on file   Social History Narrative    Not on file     Social Determinants of Health     Financial Resource Strain: Not on file   Food Insecurity: No Food Insecurity    Worried About Running Out of Food in the Last Year: Never true    [address] in the Last Year: Never true   Transportation Needs: No Transportation Needs    Lack of Transportation (Medical): No    Lack of Transportation (Non-Medical):  No   Physical Activity: Not on file   Stress: Not on file   Social Connections: Not on file   Intimate Partner Violence: Not on file   Housing Stability: Low Risk     Unable to Pay for Housing in the Last Year: No    Number of Places Lived in the Last Year: 1 Unstable Housing in the Last Year: No       Subjective         Objective    Physical Exam:   Assessment Type: During-treatment  General Appearance: Alert, Awake  Respiratory Pattern: Dyspnea with exertion  Chest Assessment: Chest expansion symmetrical  Bilateral Breath Sounds: Diminished  Cough: Non-productive, Congested, Strong    Vitals:  Blood pressure 128/70, pulse (!) 110, temperature 99 6 °F (37 6 °C), temperature source Oral, resp  rate 17, height 5' 4" (1 626 m), weight 86 4 kg (190 lb 7 6 oz), SpO2 94 %  Imaging and other studies: I have personally reviewed pertinent reports  Plan    Respiratory Plan: Home Bronchodilator Patient pathway  Airway Clearance Plan: Incentive Spirometer     Resp Comments: Pt admitted w/ pneumonia  Pt on RA 95%  Pt stated she takes no home respiratory medications  Pt is on CPAP @ home but does not wish to wear while here

## 2023-03-03 NOTE — CONSULTS
Consultation - Electrophysiology-Cardiology (EP)   Nova Seat 68 y o  female MRN: [id number]  Unit/Bed#: [facility] 529-01 Encounter: [id number]      Inpatient consult to Electrophysiology  Consult performed by: Karla Castellanos PA-C  Consult ordered by: Lisa Prescott MD          Assessment/Plan     Assessment:  1  Recurrent SVT, maintained on Lopressor as an outpatient  2  Low normal LV systolic function with EF 50% per echo this admission  3  No obstructive CAD per cardiac catheterization in 2016  4  Hypertension  5  Dyslipidemia  6  Diabetes mellitus type 2  7  Obesity with BMI 32  8  Obstructive sleep apnea, maintained on CPAP  9  Pneumonia with sepsis, maintained on ceftriaxone      Plan:  She had 2 discrete episodes of SVT while we were seeing her in consultation  Her heart rates were over 200 bpm, and while she reported palpitations she denies significant chest pain, shortness of breath, dizziness, or lightheadedness associated  She remained hemodynamically stable throughout the episode  1 episode started as a wide-complex tachycardia and then changed into a narrow complex tachycardia  While there was some concern for VT, we feel that this was SVT with aberrancy (see strips below)  Her first episode terminated with taking deep breaths with the incentive spirometer  She quickly went back into SVT, and was given IV Lopressor 5 mg x 1 with response  Recommend EPS/SVT ablation as an inpatient given her significant, recurrent episodes  The procedure was explained to the patient in detail, and she is in agreement to proceed  She understands that she will need to remain in the hospital throughout the weekend, with possible ablation sometime next week  She understands and agrees with this plan  In the meantime, we will discontinue verapamil and instead start Lopressor 50 mg twice daily  First dose now  She also has IV Lopressor ordered as needed for recurrent episodes    We will monitor her throughout the weekend, plan on holding Lopressor after her a m  dose on Sunday  Continue to monitor telemetry and labs throughout the weekend  History of Present Illness   Physician Requesting Consult: Thalia Schmitz, *  Reason for Consult / Principal Problem: SVT    HPI: Steve Mariano is a 68y o  year old female with recurrent SVT, low normal LV systolic function, prior cardiac cath with no obstructive CAD, hypertension, dyslipidemia, diabetes, obesity with BMI 32, and obstructive sleep apnea maintained on CPAP  She typically follows with a cardiologist at [facility], and review of those notes show that she has had paroxysmal SVT/palpitations since at least 2016  She has been maintained on metoprolol as an outpatient, with uptitration as needed due to breakthrough episodes  She presented to [address] [place] [address] 3/1/2023 with more frequent SVT  She was found to have heart rates over 200 bpm, and was given several doses of adenosine  She apparently did not respond to IV Cardizem  Per notes, a rapid response was called upon arrival to the floor due to narrow complex tachycardia as well as runs of wide-complex tachycardia  It sounds like she was hemodynamically stable and largely asymptomatic with these events  While she had been on metoprolol as an outpatient, given her ongoing episodes she was eventually changed to verapamil and is currently on 80 mg 3 times daily  Given her persistent episodes, she was transferred to [facility] for EP evaluation  Just prior to us seeing the patient, telemetry showed that she was in an episode of SVT  She also had wide-complex tachycardia on telemetry concerning for VT, however it was felt to be SVT with aberrancy  She remained hemodynamically stable throughout her episode, with a blood pressure around 150  Please see in the plans above for full details regarding her episode and treatment    She was able to converse throughout the episodes despite her tachycardia, and while she was aware of when the episodes were starting and stopping, she was not presyncopal or hemodynamically unstable at any time  She reports that overall her episodes are becoming much more frequent in general, and it is impacting her overall quality of life  She is interested in pursuing more definitive therapy with an ablation  Review of Systems  ROS as noted above, otherwise 12 point review of systems was performed and is negative  Historical Information   Past Medical History:   Diagnosis Date   • Diabetes mellitus ([address] )    • Hyperlipidemia    • Hypertension    • ABBY (obstructive sleep apnea)     on CPAP   • SVT (supraventricular tachycardia) ([facility])      Past Surgical History:   Procedure Laterality Date   • BREAST SURGERY     •  SECTION     • NOSE SURGERY     • TONSILLECTOMY       Social History     Substance and Sexual Activity   Alcohol Use Not Currently     Social History     Substance and Sexual Activity   Drug Use Never     Social History     Tobacco Use   Smoking Status Former   • Types: Cigarettes   • Quit date: 1989   • Years since quittin 1   Smokeless Tobacco Never     Family History: No family history on file      Meds/Allergies   Hospital Medications:   Current Facility-Administered Medications   Medication Dose Route Frequency   • acetaminophen (TYLENOL) tablet 650 mg  650 mg Oral Q6H PRN   • aspirin (ECOTRIN LOW STRENGTH) EC tablet 81 mg  81 mg Oral Daily   • atorvastatin (LIPITOR) tablet 40 mg  40 mg Oral Daily With Dinner   • benzonatate (TESSALON PERLES) capsule 100 mg  100 mg Oral TID PRN   • cefTRIAXone (ROCEPHIN) 1,000 mg in dextrose 5 % 50 mL IVPB  1,000 mg Intravenous Q24H   • enoxaparin (LOVENOX) subcutaneous injection 40 mg  40 mg Subcutaneous Daily   • guaiFENesin (MUCINEX) 12 hr tablet 600 mg  600 mg Oral Q12H XAVIER   • insulin lispro (HumaLOG) 100 units/mL subcutaneous injection 1-6 Units  1-6 Units Subcutaneous TID AC   • insulin lispro (HumaLOG) 100 units/mL subcutaneous injection 1-6 Units  1-6 Units Subcutaneous HS   • levalbuterol (XOPENEX) inhalation solution 1 25 mg  1 25 mg Nebulization Q4H PRN   • metoprolol (LOPRESSOR) injection 5 mg  5 mg Intravenous Q6H PRN   • ondansetron (ZOFRAN) injection 4 mg  4 mg Intravenous Q6H PRN   • senna-docusate sodium (SENOKOT S) 8 6-50 mg per tablet 1 tablet  1 tablet Oral HS   • verapamil (CALAN) tablet 80 mg  80 mg Oral Q8H [address]     Home Medications:   Medications Prior to Admission   Medication   • aspirin (ECOTRIN LOW STRENGTH) 81 mg EC tablet   • benzonatate (TESSALON PERLES) 100 mg capsule   • Cholecalciferol 125 MCG (5000 UT) capsule   • losartan (COZAAR) 25 mg tablet   • Melatonin 5 MG CAPS   • metFORMIN (GLUCOPHAGE) 500 mg tablet   • metoprolol tartrate (LOPRESSOR) 25 mg tablet   • Multiple Vitamins-Minerals (CENTRUM SILVER PO)   • rosuvastatin (CRESTOR) 5 mg tablet       Allergies   Allergen Reactions   • Lisinopril Cough     ace cough  ace cough    Ace-cough   • Naproxen Palpitations   • Pseudoephedrine Palpitations       Objective   Vitals: Blood pressure 136/74, pulse 100, temperature 98 2 °F (36 8 °C), resp  rate 18, height 5' 4" (1 626 m), weight 86 1 kg (189 lb 13 1 oz), SpO2 92 %  Orthostatic Blood Pressures    Flowsheet Row Most Recent Value   Blood Pressure 136/74 filed at 03/03/2023 0651   Patient Position - Orthostatic VS Sitting filed at 03/03/2023 0224            Intake/Output Summary (Last 24 hours) at 3/3/2023 1118  Last data filed at 3/3/2023 0902  Gross per 24 hour   Intake 470 ml   Output 800 ml   Net -330 ml       Invasive Devices     Peripheral Intravenous Line  Duration           Peripheral IV 02/28/23 Dorsal (posterior); Right Hand 2 days    Peripheral IV 03/02/23 Right Antecubital <1 day                Physical Exam  GEN: NAD, alert and oriented x 3, well appearing  SKIN: dry without significant lesions or rashes  HEENT: NCAT, PERRL, EOMs intact  NECK: No JVD appreciated  CARDIOVASCULAR: RRR, normal S1, S2 without murmurs, rubs, or gallops appreciated  LUNGS: Clear to auscultation bilaterally without wheezes, rhonchi, or rales  ABDOMEN: Soft, nontender, nondistended  EXTREMITIES/VASCULAR: perfused without clubbing, cyanosis, or LE edema b/l  PSYCH: Normal mood and affect  NEURO: CN ll-Xll grossly intact      Lab Results: I have personally reviewed pertinent lab results  Results from last 7 days   Lab Units 03/03/23  0543 03/02/23  0530 03/01/23  0513   WBC Thousand/uL 11 40* 11 66* 10 46*   HEMOGLOBIN g/dL 13 0 12 3 12 7   HEMATOCRIT % 38 9 37 0 38 8   PLATELETS Thousands/uL 424* 333 318     Results from last 7 days   Lab Units 03/03/23  0543 03/02/23  0530 03/01/23  0513   POTASSIUM mmol/L 4 0 3 9 4 0   CHLORIDE mmol/L 102 101 101   CO2 mmol/L 28 31 29   BUN mg/dL 14 10 14   CREATININE mg/dL 0 47* 0 42* 0 48*   CALCIUM mg/dL 9 8 9 0 8 8     Results from last 7 days   Lab Units 02/28/23  1053   INR  1 02   PTT seconds 29     Results from last 7 days   Lab Units 03/03/23  0543 03/02/23  0530 03/01/23  0513   MAGNESIUM mg/dL 2 4 2 2 2 8*       Imaging: I have personally reviewed pertinent reports  ECHO 3/2023:  Interpretation Summary       •  Left Ventricle: Left ventricular cavity size is normal  Wall thickness is normal  The left ventricular ejection fraction is 50%  Systolic function is low normal  Wall motion is normal with the exception of abnormal septal motion  Diastolic function is mildly abnormal, consistent with grade I (abnormal) relaxation  Left atrial filling pressure is normal   •  IVS: There is abnormal septal motion of unclear etiology  •  Left Atrium: The atrium is dilated  •  Mitral Valve: There is mild calcification of the mitral leaflets  The leaflets exhibit normal mobility  There is annular calcification  There is trace to mild regurgitation  There is no evidence of stenosis  •  Tricuspid Valve:  There is mild regurgitation  There is no evidence of stenosis  The right ventricular systolic pressure is mildly elevated  The estimated right ventricular systolic pressure is 44 77 mmHg  •  Aorta: The aortic root is upper normal in size  The ascending aorta is normal in size  The aortic root is 3 60 cm  The ascending aorta is 3 2 cm  •  Prior TTE study available for comparison  Prior study date: 3/31/2015  Changes noted when compared to prior study  Changes include: RVSP on prior study was 26 mmHg compared to 36 mmHg on current study  Valve regurgitation is unchanged  EF 50-55%  Prior stress echocardiogram 2019 showed EF of 50%  [facility]       CATH 2016:          EKG:           Baseline -         TELEMETRY:                 VTE Prophylaxis: Enoxaparin (Lovenox)

## 2023-03-03 NOTE — ASSESSMENT & PLAN NOTE
· Recurrent episodes of SVT while hospitalized with pneumonia at [facility]  · Transferred to [facility] for evaluation by electrophysiology    · Cardizem discontinued  · Metoprolol tartrate increased to 50 mg twice daily  · EP planning for SVT ablation sometime early this coming week  · Continue to monitor on telemetry

## 2023-03-03 NOTE — UTILIZATION REVIEW
NOTIFICATION OF ADMISSION DISCHARGE   This is a Notification of Discharge from [address]  Please be advised that this patient has been discharge from our facility  Below you will find the admission and discharge date and time including the patient’s disposition  UTILIZATION REVIEW CONTACT:  [address] [place]  Utilization   Network Utilization Review Department  Phone: [phone] x carefully listen to the prompts  All voicemails are confidential   Email: [email or web address]     ADMISSION INFORMATION  PRESENTATION DATE: 2/28/2023 10:38 AM  OBERVATION ADMISSION DATE:   INPATIENT ADMISSION DATE: 2/28/23  1:39 PM   DISCHARGE DATE: 3/2/2023  3:45 PM   DISPOSITION:[facility]    IMPORTANT INFORMATION:  Send all requests for admission clinical reviews, approved or denied determinations and any other requests to dedicated fax number below belonging to the campus where the patient is receiving treatment   List of dedicated fax numbers:  [address] DENIALS (Administrative/Medical Necessity) [phone]   [address] (Maternity/NICU/Pediatrics) [phone]   [facility] [phone]   [place] [address] [phone]   [address] [phone]   [address] [facility] [place] [phone] 90 [facility] [phone]   [address] [place] [address] 119 [phone]   [facility]  [phone]   [address] [facility] [phone]   [address] [facility] [address] [facility] [phone]

## 2023-03-03 NOTE — ASSESSMENT & PLAN NOTE
· Criteria on admission with fever, tachycardia, and right lower lobe pneumonia diagnosed as an outpatient  · The treatment plan for pneumonia

## 2023-03-03 NOTE — UTILIZATION REVIEW
Initial Clinical Review    Admission: Date/Time/Statement:   Admission Orders (From admission, onward)     Ordered        03/02/23 1704  Inpatient Admission  Once                      Orders Placed This Encounter   Procedures   • Inpatient Admission     Standing Status:   Standing     Number of Occurrences:   1     Order Specific Question:   Level of Care     Answer:   Med Surg [16]     Order Specific Question:   Estimated length of stay     Answer:   More than 2 Midnights     Order Specific Question:   Certification     Answer:   I certify that inpatient services are medically necessary for this patient for a duration of greater than two midnights  See H&P and MD Progress Notes for additional information about the patient's course of treatment  ED Arrival Information     Patient not seen in ED                     No chief complaint on file  Initial Presentation: 68 y o  female with PMH of SVT, sleep apnea, obesity, thyroid nodule was transferred from [facility] to General acute hospital for a higher level of care  Pt initially presented to [facility] fever, persistent cough and HR into 160s  Pt reports that he was in his PCPs office and noted  HR of 140, T 101 8 and persistent cough, started on Azithromycin and Tessalon Perles  The next day HR into 160s and pcp referred him to go to the ED  Received 1L IVF bolus, adenosine IV and po lopressor and Cardizem in the ED  He went into SVT w/ HR up to 220, placed on mon and pads  Received IV KCl and Mag, received adenosine and Lopressor w/o response  He had 1 episode of widened complex w/c was resolved w/ lopressor IV but remained in and out of SVT  Meds switched to verapamil IV then started on po  Had 2 more episodes of SVT in ICU requiring 2 more dosed of IV Adenosine and IV Lopressor  Evaluated by cardiology and determined the patient appeared to be having considered atrial tachycardia with aberrancy  Transferred to [facility] for EP consult  Remained on NSR    On arrival to [facility], pt aaox3, wheezing, normal HR and regular rhythm present  Emphysematous changes on CT scan  Procal neg       Admitted as Inpatient for SVT, sepsis, PNA, ORLANDO: EP consult  Cont verapamil po  Resp protocol  Start Ceftriaxone, cont Mucinex, Sputum culture is pending  Blood cultures are pending  Tylenol for fever  Mon vol status  Date: 03/03   Day 2:   EP Consult: Recurrent SVT, Low normal LV systolic function with EF 50% on echo: Plan: Recommend EPS/SVT ablation  DC verapamil and start Lopressor 50 mg twice daily  First dose now  IV Lopressor as needed for recurrent episodes  monitor her throughout the weekend, plan on holding Lopressor after her a m  dose on Sunday  Continue to monitor telemetry and labs throughout the weekend  IM Notes: Pt had another episode of SVT earlier today, converted back to NST, remained asymptomatic  Still w/ cough  Cardizem dc'd  Metoprolol tartrate increased to 50 mg twice daily  EP planning for SVT ablation sometime early this coming week  Continue to monitor on telemetry  Cont Ceftriaxone  Cont Mucinex  PE: alert, oriented, regular HR and rhythm       ED Triage Vitals   Temperature Pulse Respirations Blood Pressure SpO2   03/02/23 1710 03/02/23 1710 03/02/23 1710 03/02/23 1710 03/02/23 1710   99 °F (37 2 °C) 101 17 133/63 93 %      Temp Source Heart Rate Source Patient Position - Orthostatic VS BP Location FiO2 (%)   03/02/23 1909 03/02/23 1909 03/02/23 1909 03/02/23 1909 --   Oral Monitor Sitting Right arm       Pain Score       03/02/23 1715       No Pain          Wt Readings from Last 1 Encounters:   03/03/23 86 1 kg (189 lb 13 1 oz)     Additional Vital Signs:   Date/Time Temp Pulse Resp BP MAP (mmHg) SpO2 O2 Device Patient Position - Orthostatic VS   03/03/23 1256 -- 105  -- -- -- 92 % -- --   Pulse: 105 at 03/03/23 1256   03/03/23 12:45:44 -- 124 Abnormal  -- 150/83 105 89 % Abnormal  -- --   03/03/23 12:42:15 -- 208 Abnormal  -- 136/82 100 94 % -- --   03/03/23 11:57:29 99 1 °F (37 3 °C) 107 Abnormal  -- 137/79 98 92 % -- Sitting   03/03/23 06:51:21 98 2 °F (36 8 °C) 100 18 136/74 -- 92 % None (Room air) --   03/03/23 05:43:14 -- 100 -- 136/74 95 93 % -- --   03/03/23 0540 -- -- -- 136/74 -- -- -- --   03/03/23 02:24:44 -- 107 Abnormal  20 106/70 82 91 % None (Room air) Sitting   03/02/23 23:25:49 99 6 °F (37 6 °C) 104 18 100/73 82 92 % None (Room air) Lying   03/02/23 21:31:19 -- 125 Abnormal  -- 130/75 93 92 % -- --   03/02/23 1959 -- -- -- -- -- 94 % None (Room air) --   03/02/23 19:09:08 99 6 °F (37 6 °C) 110 Abnormal  17 128/70 89 92 % None (Room air) Sitting       Pertinent Labs/Diagnostic Test Results:   03/01ECHO:  Left Ventricle: Left ventricular cavity size is normal  Wall thickness is normal  The left ventricular ejection fraction is 50%  Systolic function is low normal  Wall motion is normal with the exception of abnormal septal motion  Diastolic function is mildly abnormal, consistent with grade I (abnormal) relaxation  Left atrial filling pressure is normal   •  IVS: There is abnormal septal motion of unclear etiology  •  Left Atrium: The atrium is dilated  •  Mitral Valve: There is mild calcification of the mitral leaflets  The leaflets exhibit normal mobility  There is annular calcification  There is trace to mild regurgitation  There is no evidence of stenosis  •  Tricuspid Valve: There is mild regurgitation  There is no evidence of stenosis  The right ventricular systolic pressure is mildly elevated  The estimated right ventricular systolic pressure is 28 52 mmHg  •  Aorta: The aortic root is upper normal in size  The ascending aorta is normal in size  The aortic root is 3 60 cm  The ascending aorta is 3 2 cm  •  Prior TTE study available for comparison  Prior study date: 3/31/2015  Changes noted when compared to prior study  Changes include: RVSP on prior study was 26 mmHg compared to 36 mmHg on current study  Valve regurgitation is unchanged  EF 50-55%   Prior stress echocardiogram 2019 showed EF of 50%         Results from last 7 days   Lab Units 02/28/23  1053   SARS-COV-2  Negative     Results from last 7 days   Lab Units 03/03/23  0543 03/02/23  0530 03/01/23  0513 02/28/23  1053   WBC Thousand/uL 11 40* 11 66* 10 46* 13 00*   HEMOGLOBIN g/dL 13 0 12 3 12 7 14 3   HEMATOCRIT % 38 9 37 0 38 8 43 6   PLATELETS Thousands/uL 424* 333 318 351   NEUTROS ABS Thousands/µL  --   --  6 36 8 31*         Results from last 7 days   Lab Units 03/03/23  0543 03/02/23  0530 03/01/23  0513 02/28/23 2050 02/28/23  1053   SODIUM mmol/L 136 138 136 134* 135   POTASSIUM mmol/L 4 0 3 9 4 0 3 6 3 8   CHLORIDE mmol/L 102 101 101 101 100   CO2 mmol/L 28 31 29 27 25   ANION GAP mmol/L 6 6 6 6 10   BUN mg/dL 14 10 14 20 20   CREATININE mg/dL 0 47* 0 42* 0 48* 0 59* 0 61   EGFR ml/min/1 73sq m 95 99 94 88 87   CALCIUM mg/dL 9 8 9 0 8 8 9 0 10 0   CALCIUM, IONIZED mmol/L  --   --   --  1 13 1 10*   MAGNESIUM mg/dL 2 4 2 2 2 8* 1 8* 2 0   PHOSPHORUS mg/dL 3 4  --  2 4 2 5 2 7     Results from last 7 days   Lab Units 03/01/23  0513 02/28/23  1053   AST U/L 20 24   ALT U/L 15 14   ALK PHOS U/L 57 63   TOTAL PROTEIN g/dL 7 1 7 9   ALBUMIN g/dL 3 8 4 1   TOTAL BILIRUBIN mg/dL 0 53 0 55     Results from last 7 days   Lab Units 03/03/23  1129 03/03/23  0613 03/02/23  2103 03/02/23  1741 03/02/23  1129 03/02/23  0707 03/01/23  2135 03/01/23  1658 03/01/23  1134 03/01/23  0745 02/28/23  2201 02/28/23  1808   POC GLUCOSE mg/dl 109 160* 146* 100 145* 142* 140 115 117 152* 147* 165*     Results from last 7 days   Lab Units 03/03/23  0543 03/02/23  0530 03/01/23  0513 02/28/23 2050 02/28/23  1053   GLUCOSE RANDOM mg/dL 158* 162* 136 198* 159*         Results from last 7 days   Lab Units 02/28/23  1809   HEMOGLOBIN A1C % 6 3*   EAG mg/dl 134     Results from last 7 days   Lab Units 02/28/23  1500 02/28/23  1255 02/28/23  1053   HS TNI 0HR ng/L  --   --  8   HS TNI 2HR ng/L  --  8  --    HSTNI D2 ng/L  --  0 --    HS TNI 4HR ng/L 10  --   --    HSTNI D4 ng/L 2  --   --      Results from last 7 days   Lab Units 02/28/23  1053   D-DIMER QUANTITATIVE ug/ml FEU 1 30*     Results from last 7 days   Lab Units 02/28/23  1053   PROTIME seconds 13 5   INR  1 02   PTT seconds 29     Results from last 7 days   Lab Units 02/28/23  1053   TSH 3RD GENERATON uIU/mL 0 953     Results from last 7 days   Lab Units 03/01/23  0513 02/28/23  1053   PROCALCITONIN ng/ml 0 13 0 10     Results from last 7 days   Lab Units 02/28/23  1809   LACTIC ACID mmol/L 0 9             Results from last 7 days   Lab Units 02/28/23  1053   BNP pg/mL 202*         Results from last 7 days   Lab Units 02/28/23  1501   CLARITY UA  Clear   COLOR UA  Yellow   SPEC GRAV UA  1 010   PH UA  6 0   GLUCOSE UA mg/dl Negative   KETONES UA mg/dl 15 (1+)*   BLOOD UA  Small*   PROTEIN UA mg/dl 30 (1+)*   NITRITE UA  Negative   BILIRUBIN UA  Small*   UROBILINOGEN UA E U /dl 0 2   LEUKOCYTES UA  Negative   WBC UA /hpf None Seen   RBC UA /hpf 0-1   BACTERIA UA /hpf None Seen   EPITHELIAL CELLS WET PREP /hpf Occasional     Results from last 7 days   Lab Units 02/28/23  2348 02/28/23  1053   STREP PNEUMONIAE ANTIGEN, URINE  Negative  --    LEGIONELLA URINARY ANTIGEN  Negative  --    INFLUENZA A PCR   --  Negative   INFLUENZA B PCR   --  Negative   RSV PCR   --  Negative       Results from last 7 days   Lab Units 03/01/23  0754 02/28/23  1810 02/28/23  1758   BLOOD CULTURE   --  No Growth at 48 hrs  No Growth at 48 hrs     SPUTUM CULTURE  Culture too young- will reincubate  --   --    GRAM STAIN RESULT  1+ Epithelial Cells*  2+ Polys*  1+ Gram positive cocci in pairs*  1+ Gram variable rods*  --   --                ED Treatment:   Medication Administration - No Administrations Displayed (No Start Event Found)     None        Past Medical History:   Diagnosis Date   • Diabetes mellitus ([facility] )    • Hyperlipidemia    • Hypertension    • ABBY (obstructive sleep apnea)     on CPAP   • SVT (supraventricular tachycardia) (HCC)      Present on Admission:  • Dyspnea on exertion  • Sleep apnea  • Pneumonia  • SVT (supraventricular tachycardia) (HCC)  • Thyroid nodule  • Sepsis (HCC)      Admitting Diagnosis: Sepsis ([facility] ) [A41 9]  Age/Sex: 68 y o  female  Admission Orders:  50 Hr telemetry monitoring  PT/OT  Cardio-Pulmonary Monitoring,  I/O, Daily weights, Vital signs    Scheduled Medications:  aspirin, 81 mg, Oral, Daily  atorvastatin, 40 mg, Oral, Daily With Dinner  cefTRIAXone, 1,000 mg, Intravenous, Q24H  enoxaparin, 40 mg, Subcutaneous, Daily  guaiFENesin, 600 mg, Oral, Q12H XAVIER  insulin lispro, 1-6 Units, Subcutaneous, TID AC  insulin lispro, 1-6 Units, Subcutaneous, HS  metoprolol tartrate, 50 mg, Oral, Q12H XAVIER  senna-docusate sodium, 1 tablet, Oral, HS      Continuous IV Infusions: none     PRN Meds:  acetaminophen, 650 mg, Oral, Q6H PRN  benzonatate, 100 mg, Oral, TID PRN 03/03 x 1  levalbuterol, 1 25 mg, Nebulization, Q4H PRN 03/02 x 1  metoprolol, 5 mg, Intravenous, Q6H PRN 03/03 x 1  ondansetron, 4 mg, Intravenous, Q6H PRN        IP CONSULT TO ELECTROPHYSIOLOGY    Network Utilization Review Department  ATTENTION: Please call with any questions or concerns to [phone] and carefully listen to the prompts so that you are directed to the right person  All voicemails are confidential   Leah Cotto all requests for admission clinical reviews, approved or denied determinations and any other requests to dedicated fax number below belonging to the campus where the patient is receiving treatment   List of dedicated fax numbers for the Facilities:  [address] [place] [address] DENIALS (Administrative/Medical Necessity) [phone]   [address] [place] [address] (Maternity/NICU/Pediatrics) Bharti Snow [facility] [phone]   [facility] [phone]   [place] [phone]   [address] [place] [address] [address] [place] [place] [address] [place] [address] [facility] 134 300 [facility] [phone]

## 2023-03-03 NOTE — RESTORATIVE TECHNICIAN NOTE
Restorative Technician Note      Patient Name: Ishmael Singh     Note Type: Mobility  Patient Position Upon Consult: Bedside chair  Activity Performed: Ambulated  Patient Position at End of Consult: Bedside chair;  All needs within reach

## 2023-03-03 NOTE — OCCUPATIONAL THERAPY NOTE
Occupational Therapy Evaluation      Vivian Motleykelly    3/3/2023    Principal Problem:    SVT (supraventricular tachycardia) (HCC)  Active Problems:    Sepsis ([facility] )    Pneumonia    Dyspnea on exertion    Sleep apnea    Class 1 obesity due to excess calories with serious comorbidity and body mass index (BMI) of 32 0 to 32 9 in adult    Thyroid nodule      Past Medical History:   Diagnosis Date    Diabetes mellitus ([facility] )     Hyperlipidemia     Hypertension     ABBY (obstructive sleep apnea)     on CPAP    SVT (supraventricular tachycardia) ([facility] )        Past Surgical History:   Procedure Laterality Date    BREAST SURGERY       SECTION      NOSE SURGERY      TONSILLECTOMY          23 1059   OT Last Visit   OT Visit Date 23   Note Type   Note type Evaluation   Pain Assessment   Pain Assessment Tool 0-10   Pain Score No Pain   Restrictions/Precautions   Weight Bearing Precautions Per Order No   Home Living   Type of Home Apartment   Home Layout One level;Elevator   Bathroom Shower/Tub Walk-in shower   Bathroom Toilet Standard   Bathroom Equipment Grab bars in shower; Shower chair   Bathroom Accessibility Accessible   Additional Comments no use of DME for mobility at baseline   Prior Function   Level of [place] Independent with ADLs; Independent with functional mobility   Lives With Alone   Receives Help From Family;Friend(s)   IADLs Independent with driving; Independent with meal prep; Independent with medication management   Falls in the last 6 months 0   Lifestyle   Autonomy pt reports being fully inependent w self care,mobility, driving, home management etc   Reciprocal Relationships supportive family and friends   Intrinsic Gratification play games and cards with 5 other ladies living in same complex   General   Additional Pertinent History pt reports living in a 62year+ apartment, handicap accessible   she states [facility] help eachother out as needed   Subjective   Subjective "I really have no difficulty"   ADL   Eating Assistance [address]  Independent   Additional Comments slightly winded with activity   Bed Mobility   Additional Comments pt OOB in chair upon arrival   Transfers   Sit to Stand 7  Independent   Stand to Sit 7  Independent   Stand pivot 7  Independent   Functional Mobility   Functional Mobility 5  Supervision   Balance   Static Sitting Normal   Dynamic Sitting Good   Static Standing Fair +   Dynamic Standing Fair   Activity Tolerance   Activity Tolerance Patient tolerated treatment well   Medical Staff Made Aware PT Nubia   Nurse Made Aware ok to see   RUE Assessment   RUE Assessment WFL   LUE Assessment   LUE Assessment WFL   Hand Function   Gross Motor Coordination Functional   Fine Motor Coordination Functional   Vision - Complex Assessment   Tracking Intact   Acuity Able to read clock/calendar on wall without difficulty   Psychosocial   Psychosocial (WDL) WDL   Cognition   Overall Cognitive Status WFL   Arousal/Participation Alert; Cooperative   Attention Within functional limits   Orientation Level Oriented X4   Memory Within functional limits   Following Commands Follows all commands and directions without difficulty   Assessment   Assessment Pt is a 68 y o  female seen for OT evaluation s/p admit to [facility] on 3/2/2023 w/ SVT (supraventricular tachycardia) ([facility] )  Comorbidities affecting pt's functional performance at time of assessment include: obesity and SVT, sepsis, pneumonia [place], sleep apnea  Personal factors affecting pt at time of IE include:living alone  Prior to admission, pt was living alone an apartment being fully independent w self care, mobility, driving etc  Upon evaluation: Pt requires no assist w self care, mobility  She does c/o mild SOB w activity  Pt was educated on ECT/self pacing skills with good understanding  Based on findings from OT evaluation and functional performance deficits, pt has been identified as a modeate complexity evaluation  The patient's raw score on the AM-PAC Daily Activity inpatient short form is 24, standardized score is 57 54, greater than 39 4  Patients at this level are likely to benefit from discharge to home  From OT standpoint, recommendation at time of d/c would be home w no ongoing skilled OT needs  DC OT at this time     Goals   Patient Goals to get home   Plan   OT Frequency Eval only   Recommendation   OT Discharge Recommendation No rehabilitation needs   AM-PAC Daily Activity Inpatient   Lower Body Dressing 4   Bathing 4   Toileting 4   Upper Body Dressing 4   Grooming 4   Eating 4   Daily Activity Raw Score 24   Daily Activity Standardized Score (Calc for Raw Score >=11) 57 54

## 2023-03-03 NOTE — ASSESSMENT & PLAN NOTE
· Initially admitted to [facility] with pneumonia and SVT  · Had previously been treated with azithromycin as an outpatient, and an additional 3 days of ceftriaxone while inpatient  · We will plan to complete a 5-day course of ceftriaxone  · Continue Mucinex

## 2023-03-03 NOTE — PLAN OF CARE
Problem: PAIN - ADULT  Goal: Verbalizes/displays adequate comfort level or baseline comfort level  Description: Interventions:  - Encourage patient to monitor pain and request assistance  - Assess pain using appropriate pain scale  - Administer analgesics based on type and severity of pain and evaluate response  - Implement non-pharmacological measures as appropriate and evaluate response  - Consider cultural and social influences on pain and pain management  - Notify physician/advanced practitioner if interventions unsuccessful or patient reports new pain  Outcome: Progressing     Problem: INFECTION - ADULT  Goal: Absence or prevention of progression during hospitalization  Description: INTERVENTIONS:  - Assess and monitor for signs and symptoms of infection  - Monitor lab/diagnostic results  - Monitor all insertion sites, i e  indwelling lines, tubes, and drains  - Monitor endotracheal if appropriate and nasal secretions for changes in amount and color  - Secretary appropriate cooling/warming therapies per order  - Administer medications as ordered  - Instruct and encourage patient and family to use good hand hygiene technique  - Identify and instruct in appropriate isolation precautions for identified infection/condition  Outcome: Progressing  Goal: Absence of fever/infection during neutropenic period  Description: INTERVENTIONS:  - Monitor WBC    Outcome: Progressing     Problem: SAFETY ADULT  Goal: Patient will remain free of falls  Description: INTERVENTIONS:  - Educate patient/family on patient safety including physical limitations  - Instruct patient to call for assistance with activity   - Consult OT/PT to assist with strengthening/mobility   - Keep Call bell within reach  - Keep bed low and locked with side rails adjusted as appropriate  - Keep care items and personal belongings within reach  - Initiate and maintain comfort rounds  - Make Fall Risk Sign visible to staff  - Offer Toileting every  Hours, in advance of need  - Initiate/Maintain alarm  - Obtain necessary fall risk management equipment:   - Apply yellow socks and bracelet for high fall risk patients  - Consider moving patient to room near nurses station  Outcome: Progressing  Goal: Maintain or return to baseline ADL function  Description: INTERVENTIONS:  -  Assess patient's ability to carry out ADLs; assess patient's baseline for ADL function and identify physical deficits which impact ability to perform ADLs (bathing, care of mouth/teeth, toileting, grooming, dressing, etc )  - Assess/evaluate cause of self-care deficits   - Assess range of motion  - Assess patient's mobility; develop plan if impaired  - Assess patient's need for assistive devices and provide as appropriate  - Encourage maximum independence but intervene and supervise when necessary  - Involve family in performance of ADLs  - Assess for home care needs following discharge   - Consider OT consult to assist with ADL evaluation and planning for discharge  - Provide patient education as appropriate  Outcome: Progressing  Goal: Maintains/Returns to pre admission functional level  Description: INTERVENTIONS:  - Perform BMAT or MOVE assessment daily    - Set and communicate daily mobility goal to care team and patient/family/caregiver  - Collaborate with rehabilitation services on mobility goals if consulted  - Perform Range of Motion  times a day  - Reposition patient every  hours    - Dangle patient  times a day  - Stand patient  times a day  - Ambulate patient  times a day  - Out of bed to chair  times a day   - Out of bed for meals  times a day  - Out of bed for toileting  - Record patient progress and toleration of activity level   Outcome: Progressing     Problem: DISCHARGE PLANNING  Goal: Discharge to home or other facility with appropriate resources  Description: INTERVENTIONS:  - Identify barriers to discharge w/patient and caregiver  - Arrange for needed discharge resources and transportation as appropriate  - Identify discharge learning needs (meds, wound care, etc )  - Arrange for interpretive services to assist at discharge as needed  - Refer to Case Management Department for coordinating discharge planning if the patient needs post-hospital services based on physician/advanced practitioner order or complex needs related to functional status, cognitive ability, or social support system  Outcome: Progressing     Problem: Knowledge Deficit  Goal: Patient/family/caregiver demonstrates understanding of disease process, treatment plan, medications, and discharge instructions  Description: Complete learning assessment and assess knowledge base    Interventions:  - Provide teaching at level of understanding  - Provide teaching via preferred learning methods  Outcome: Progressing

## 2023-03-03 NOTE — PHYSICAL THERAPY NOTE
Physical Therapy Evaluation     Patient's Name: Luis iMguel Rodríguez    Admitting Diagnosis  Sepsis [facility]) [A41 [id number]]    Problem List  Patient Active Problem List   Diagnosis    Sepsis ([facility] [address] )    SVT (supraventricular tachycardia) ([facility] )    Pneumonia    Dyspnea on exertion    Sleep apnea    Class 1 obesity due to excess calories with serious comorbidity and body mass index (BMI) of 32 0 to 32 9 in adult    Thyroid nodule       Past Medical History  Past Medical History:   Diagnosis Date    Diabetes mellitus ([facility] )     Hyperlipidemia     Hypertension     ABBY (obstructive sleep apnea)     on CPAP    SVT (supraventricular tachycardia) ([facility] )        Past Surgical History  Past Surgical History:   Procedure Laterality Date    BREAST SURGERY       SECTION      NOSE SURGERY      TONSILLECTOMY          23 1101   PT Last Visit   PT Visit Date 23   Note Type   Note type Evaluation   Pain Assessment   Pain Assessment Tool 0-10   Pain Score No Pain   Restrictions/Precautions   Weight Bearing Precautions Per Order No   Braces or Orthoses   (none)   Other Precautions Telemetry   Home Living   Type of Home Apartment  (62 and older)   Home Layout One level;Elevator   Bathroom Shower/Tub Walk-in shower   [address]   Additional Comments Prior to this admission patient resided alone in a one level apartment (elevator)  She report supportive neighbors who come to her apartment 4 nights/week to play games  At her baseline she is I with mobility (no use of AD), ADLs, and iADLS  Prior Function   Level of [place] Independent with ADLs; Independent with IADLS; Independent with functional mobility   Lives With Alone   Receives Help From Neighbor   IADLs Independent with meal prep; Independent with medication management   Falls in the last 6 months 0   Vocational Retired   General   Additional Pertinent History 68year old female admitted to -B on 3/2/2023 as transfer from [facility] where she initially presented on 2/28 with cough and fever  Diagnoses this admission include sepsis, PNA, and SVT (she is pending EP consult)  Family/Caregiver Present No   Cognition   Overall Cognitive Status WFL   Arousal/Participation Alert   Orientation Level Oriented X4   Memory Within functional limits   Following Commands Follows all commands and directions without difficulty   Subjective   Subjective "I feel warmth in this spot in my back when I am going to get the heart palpitation"   RUE Assessment   RUE Assessment WFL   LUE Assessment   LUE Assessment WFL   RLE Assessment   RLE Assessment WFL   LLE Assessment   LLE Assessment WFL   Bed Mobility   Supine to Sit Unable to assess   Sit to Supine Unable to assess   Additional Comments OOB in chair pre and post evaluation   Transfers   Sit to Stand 7  Independent   Stand to Sit 7  Independent   Ambulation/Elevation   Gait pattern   (reduced gait speed)   Gait Assistance 5  Supervision   Assistive Device None   Distance 120 feet   Ambulation/Elevation Additional Comments She was able to perform sit<-->stand transfers and ambulation supervision level  She walked 60 feet x2 with reduced gait speed and one mild LOB while turning however patient stated she was distracted by something in hallway   Balance   Static Sitting Normal   Static Standing Good   Ambulatory Fair +   Endurance Deficit   Endurance Deficit Description During PT evaluation patient denied any sensation of heart palpitations or dizziness  HR in upper 90s/low 100s while ambulating   Activity Tolerance   Activity Tolerance Patient tolerated treatment well   Medical Staff Made Aware This moderate complexity evaluation was performed with an occupational therapist due to the patient's co-morbidities and emerging status     Nurse Made Aware braulio to see per RN   Assessment   Prognosis Good   Problem List Decreased endurance   Assessment PT completed evaluation of 68year old female admitted to [facility] on 3/2/2023 as transfer from [facility] where she initially presented on 2/28 with cough and fever  Diagnoses this admission include sepsis, PNA, and SVT (she is pending EP consult)  Current emerging status includes telemetry monitoring and pending EP consult  PMH is significant for SVT, DM  HTN, ABBY, and breast surgery  Prior to this admission patient resided alone in a one level apartment (elevator)  She report supportive neighbors who come to her apartment 4 nights/week to play games  At her baseline she is I with mobility (no use of AD), ADLs, and iADLS  Current impairments include reported gross fatigue, decreased activity tolerance, and gait deviations  During PT evaluation patient denied any sensation of heart palpitations or dizziness  HR in upper 90s/low 100s while ambulating  She was able to perform sit<-->stand transfers and ambulation supervision level  She walked 60 feet x2 with reduced gait speed and one mild LOB while turning however patient stated she was distracted by something in hallway  I anticipate this patient is functioning at her baseline and PT d/c recommendation is for return to home envt w/o need for f/u PT  Will d/c inpt PT services  Recommend continued ambulation with RN/restorative staff     Goals   Patient Goals to return to her apartment   PT Treatment Day 0   Plan   PT Frequency (S)  Other (Comment)  (d/c inpt PT)   Recommendation   PT Discharge Recommendation (S)  No rehabilitation needs   Equipment Recommended   (none)   AM-PAC Basic Mobility Inpatient   Turning in Flat Bed Without Bedrails 4   Lying on Back to Sitting on Edge of Flat Bed Without Bedrails 4   Moving Bed to Chair 4   Standing Up From Chair Using Arms 4   Walk in Room 4   Climb 3-5 Stairs With Railing 4   Basic Mobility Inpatient Raw Score 24   Basic Mobility Standardized Score 57 68   Highest Level Of Mobility   JH-HLM Goal 8: Walk 250 feet or more   JH-HLM Achieved 7: Walk 25 feet or more The patient's AM-PAC Basic Mobility Inpatient Standardized Score is greater than 42 9, suggesting this patient may benefit from discharge to home  Please also refer to the recommendation of the Physical Therapist for safe discharge planning        Chris Montanez PT, DPT

## 2023-03-03 NOTE — PROGRESS NOTES
[address] [facility]  Progress Note - Luis Miguel Rodríguez 1945, 68 y o  female MRN: [id number]  Unit/Bed#: [facility] 529-01 Encounter: [id number]  Primary Care Provider: Alexandria Rust MD   Date and time admitted to hospital: 3/2/2023  4:53 PM    * SVT (supraventricular tachycardia) ([facility] [address] )  Assessment & Plan  · Recurrent episodes of SVT while hospitalized with pneumonia at [facility]  · Transferred to [facility] for evaluation by electrophysiology    · Cardizem discontinued  · Metoprolol tartrate increased to 50 mg twice daily  · EP planning for SVT ablation sometime early this coming week  · Continue to monitor on telemetry    Pneumonia  Assessment & Plan  · Initially admitted to [facility] with pneumonia and SVT  · Had previously been treated with azithromycin as an outpatient, and an additional 3 days of ceftriaxone while inpatient  · We will plan to complete a 5-day course of ceftriaxone  · Continue Mucinex      Sepsis ([facility] [address] )  Assessment & Plan  · Criteria on admission with fever, tachycardia, and right lower lobe pneumonia diagnosed as an outpatient  · The treatment plan for pneumonia    Thyroid nodule  Assessment & Plan  · Incidental finding on CT scan 4 2 cm right thyroid nodule with mass effect on the trachea   Recommend follow-up with nonemergent thyroid ultrasound    Class 1 obesity due to excess calories with serious comorbidity and body mass index (BMI) of 32 0 to 32 9 in adult  Assessment & Plan  · Affects all aspects of her care  · Diet and lifestyle modifications recommended    Sleep apnea  Assessment & Plan  · Patient supposed to wear CPAP  · Hold due to sputum production   Dyspnea on exertion  Assessment & Plan  · Echocardiogram Systolic function is low normal  Wall motion is normal with the exception of abnormal septal motion   Diastolic function is mildly abnormal, consistent with grade I   · Patient had a cardiac cath in 2016 which was negative, he also had a stress test in 2016 and 2019, patient SVT history is since 2016  · CTA of the chest was negative for PE and pulmonary edema, pneumonia postive   · Likely due to pneumonia  · Improved with treatment; continue to monitor      VTE Pharmacologic Prophylaxis: VTE Score: 4 Moderate Risk (Score 3-4) - Pharmacological DVT Prophylaxis Ordered: enoxaparin (Lovenox)  Patient Centered Rounds: I performed bedside rounds with nursing staff today  Discussions with Specialists or Other Care Team Provider: COLUMBA     Education and Discussions with Family / Patient: Patient declined call to   Total Time Spent on Date of Encounter in care of patient: 35 minutes This time was spent on one or more of the following: performing physical exam; counseling and coordination of care; obtaining or reviewing history; documenting in the medical record; reviewing/ordering tests, medications or procedures; communicating with other healthcare professionals and discussing with patient's family/caregivers  Current Length of Stay: 1 day(s)  Current Patient Status: Inpatient   Certification Statement: The patient will continue to require additional inpatient hospital stay due to SVT ablation  Discharge Plan: Anticipate discharge in >72 hrs to home  Code Status: Level 1 - Full Code    Subjective:   Patient seen and examined  Another episode of SVT earlier today, conveniently while being seen by EP  Ultimately converted back to NSR  Essentially remained asymptomatic  Still has a bit of a cough but is afebrile  Overall improving from that standpoint  Objective:     Vitals:   Temp (24hrs), Av 1 °F (37 3 °C), Min:98 2 °F (36 8 °C), Max:99 6 °F (37 6 °C)    Temp:  [98 2 °F (36 8 °C)-99 6 °F (37 6 °C)] 99 1 °F (37 3 °C)  HR:  [100-208] 105  Resp:  [17-27] 18  BP: (100-150)/(63-83) 150/83  SpO2:  [89 %-94 %] 92 %  Body mass index is 32 58 kg/m²       Input and Output Summary (last 24 hours): Intake/Output Summary (Last 24 hours) at 3/3/2023 1500  Last data filed at 3/3/2023 1134  Gross per 24 hour   Intake 470 ml   Output 950 ml   Net -480 ml       PHYSICAL EXAM:    Vitals signs reviewed  Constitutional   Awake and cooperative  NAD  Head/Neck   Normocephalic  Atraumatic  HEENT   No scleral icterus  EOMI  Heart   Regular rate and rhythm  No murmers  Lungs   Clear to auscultation bilaterally  Respirations unlaboured  Abdomen   Soft  Nontender  Nondistended  Skin   Skin color normal  No rashes  Extremities   No deformities  No peripheral edema  Neuro   Alert and oriented  No new deficits  Psych   Mood stable  Affect normal          Additional Data:     Labs:  Results from last 7 days   Lab Units 03/03/23  0543 03/02/23  0530 03/01/23  0513   WBC Thousand/uL 11 40*   < > 10 46*   HEMOGLOBIN g/dL 13 0   < > 12 7   HEMATOCRIT % 38 9   < > 38 8   PLATELETS Thousands/uL 424*   < > 318   NEUTROS PCT %  --   --  60   LYMPHS PCT %  --   --  20   MONOS PCT %  --   --  16*   EOS PCT %  --   --  2    < > = values in this interval not displayed  Results from last 7 days   Lab Units 03/03/23  0543 03/02/23  0530 03/01/23  0513   SODIUM mmol/L 136   < > 136   POTASSIUM mmol/L 4 0   < > 4 0   CHLORIDE mmol/L 102   < > 101   CO2 mmol/L 28   < > 29   BUN mg/dL 14   < > 14   CREATININE mg/dL 0 47*   < > 0 48*   ANION GAP mmol/L 6   < > 6   CALCIUM mg/dL 9 8   < > 8 8   ALBUMIN g/dL  --   --  3 8   TOTAL BILIRUBIN mg/dL  --   --  0 53   ALK PHOS U/L  --   --  57   ALT U/L  --   --  15   AST U/L  --   --  20   GLUCOSE RANDOM mg/dL 158*   < > 136    < > = values in this interval not displayed       Results from last 7 days   Lab Units 02/28/23  1053   INR  1 02     Results from last 7 days   Lab Units 03/03/23  1129 03/03/23  0613 03/02/23  2103 03/02/23  1741 03/02/23  1129 03/02/23  0707 03/01/23  2135 03/01/23  1658 03/01/23  1134 03/01/23  0745 02/28/23  2201 02/28/23  1808   POC GLUCOSE mg/dl 109 160* 146* 100 145* 142* 140 115 117 152* 147* 165*     Results from last 7 days   Lab Units 02/28/23  1809   HEMOGLOBIN A1C % 6 3*     Results from last 7 days   Lab Units 03/01/23  0513 02/28/23  1809 02/28/23  1053   LACTIC ACID mmol/L  --  0 9  --    PROCALCITONIN ng/ml 0 13  --  0 10       Lines/Drains:  Invasive Devices     Peripheral Intravenous Line  Duration           Peripheral IV 02/28/23 Dorsal (posterior); Right Hand 2 days    Peripheral IV 03/02/23 Right Antecubital <1 day                  Telemetry:  Telemetry Orders (From admission, onward)             48 Hour Telemetry Monitoring  Continuous x 48 hours        References:    Telemetry Guidelines   Question:  Reason for 48 Hour Telemetry  Answer:  Arrhythmias Requiring Medical Therapy (eg  SVT, Vtach/fib, Bradycardia, Uncontrolled A-fib)                 Telemetry Reviewed: Sinus rhythm with runs of SVT  Indication for Continued Telemetry Use: Arrthymias requiring medical therapy             Imaging: No pertinent imaging reviewed  Recent Cultures (last 7 days):   Results from last 7 days   Lab Units 03/01/23  0754 02/28/23  2348 02/28/23  1810 02/28/23  1758   BLOOD CULTURE   --   --  No Growth at 48 hrs  No Growth at 48 hrs  SPUTUM CULTURE  3+ Growth of  Commensal respiratory [place] only; No significant growth of Staph aureus/MRSA or Pseudomonas aeruginosa    --   --   --    GRAM STAIN RESULT  1+ Epithelial Cells*  2+ Polys*  1+ Gram positive cocci in pairs*  1+ Gram variable rods*  --   --   --    LEGIONELLA URINARY ANTIGEN   --  Negative  --   --        Last 24 Hours Medication List:   Current Facility-Administered Medications   Medication Dose Route Frequency Provider Last Rate   • acetaminophen  650 mg Oral Q6H PRN Francesco Bray MD     • adenosine          • aspirin  81 mg Oral Daily Francesco Bray MD     • atorvastatin  40 mg Oral Daily With Zuly Wilkerson MD     • benzonatate  100 mg Oral TID PRN Francesco Bray MD     • cefTRIAXone 1,000 mg Intravenous Q24H Steve Wellington DO     • enoxaparin  40 mg Subcutaneous Daily Osvaldo Banegas MD     • guaiFENesin  600 mg Oral Q12H Sebastián Jefferson MD     • insulin lispro  1-6 Units Subcutaneous TID AC Osvaldo Banegas MD     • insulin lispro  1-6 Units Subcutaneous HS Osvaldo Banegas MD     • levalbuterol  1 25 mg Nebulization Q4H PRN Osvaldo Banegas MD     • metoprolol  5 mg Intravenous Q6H PRN Osvaldo Banegas MD     • metoprolol tartrate  50 mg Oral Q12H [facility] Autumn Eckert PA-C     • ondansetron  4 mg Intravenous Q6H PRN Osvaldo Banegas MD     • senna-docusate sodium  1 tablet Oral HS Osvaldo Banegas MD          Today, Patient Was Seen By: Steve Wellington DO    **Please Note: This note may have been constructed using a voice recognition system  **

## 2023-03-03 NOTE — ASSESSMENT & PLAN NOTE
· Incidental finding on CT scan 4 2 cm right thyroid nodule with mass effect on the trachea   Recommend follow-up with nonemergent thyroid ultrasound

## 2023-03-04 LAB
ANION GAP SERPL CALCULATED.3IONS-SCNC: 7 MMOL/L (ref 4–13)
BUN SERPL-MCNC: 13 MG/DL (ref 5–25)
CALCIUM SERPL-MCNC: 10 MG/DL (ref 8.3–10.1)
CHLORIDE SERPL-SCNC: 101 MMOL/L (ref 96–108)
CO2 SERPL-SCNC: 29 MMOL/L (ref 21–32)
CREAT SERPL-MCNC: 0.48 MG/DL (ref 0.6–1.3)
GFR SERPL CREATININE-BSD FRML MDRD: 94 ML/MIN/1.73SQ M
GLUCOSE SERPL-MCNC: 123 MG/DL (ref 65–140)
GLUCOSE SERPL-MCNC: 137 MG/DL (ref 65–140)
GLUCOSE SERPL-MCNC: 140 MG/DL (ref 65–140)
GLUCOSE SERPL-MCNC: 141 MG/DL (ref 65–140)
GLUCOSE SERPL-MCNC: 155 MG/DL (ref 65–140)
MAGNESIUM SERPL-MCNC: 2.5 MG/DL (ref 1.6–2.6)
POTASSIUM SERPL-SCNC: 4.4 MMOL/L (ref 3.5–5.3)
SODIUM SERPL-SCNC: 137 MMOL/L (ref 135–147)

## 2023-03-04 RX ORDER — METOPROLOL TARTRATE 50 MG/1
50 TABLET, FILM COATED ORAL EVERY 12 HOURS SCHEDULED
Status: DISCONTINUED | OUTPATIENT
Start: 2023-03-04 | End: 2023-03-05

## 2023-03-04 RX ADMIN — CEFTRIAXONE SODIUM 1000 MG: 10 INJECTION, POWDER, FOR SOLUTION INTRAVENOUS at 16:35

## 2023-03-04 RX ADMIN — METOROPROLOL TARTRATE 5 MG: 5 INJECTION, SOLUTION INTRAVENOUS at 12:32

## 2023-03-04 RX ADMIN — METOROPROLOL TARTRATE 5 MG: 5 INJECTION, SOLUTION INTRAVENOUS at 14:10

## 2023-03-04 RX ADMIN — INSULIN LISPRO 1 UNITS: 100 INJECTION, SOLUTION INTRAVENOUS; SUBCUTANEOUS at 11:28

## 2023-03-04 RX ADMIN — GUAIFENESIN 600 MG: 600 TABLET, EXTENDED RELEASE ORAL at 08:16

## 2023-03-04 RX ADMIN — METOPROLOL TARTRATE 25 MG: 50 TABLET, FILM COATED ORAL at 08:30

## 2023-03-04 RX ADMIN — METOPROLOL TARTRATE 50 MG: 50 TABLET, FILM COATED ORAL at 20:39

## 2023-03-04 RX ADMIN — GUAIFENESIN 600 MG: 600 TABLET, EXTENDED RELEASE ORAL at 20:39

## 2023-03-04 RX ADMIN — ASPIRIN 81 MG: 81 TABLET, COATED ORAL at 08:16

## 2023-03-04 RX ADMIN — ENOXAPARIN SODIUM 40 MG: 40 INJECTION SUBCUTANEOUS at 08:16

## 2023-03-04 RX ADMIN — METOROPROLOL TARTRATE 5 MG: 5 INJECTION, SOLUTION INTRAVENOUS at 18:50

## 2023-03-04 RX ADMIN — METOROPROLOL TARTRATE 5 MG: 5 INJECTION, SOLUTION INTRAVENOUS at 08:15

## 2023-03-04 RX ADMIN — ATORVASTATIN CALCIUM 40 MG: 40 TABLET, FILM COATED ORAL at 16:16

## 2023-03-04 NOTE — PROGRESS NOTES
Pt with multiple episodes of SVT today; able to convert back to NSR/ST with deep breathing and IS use  IV lopressor PRN if HR not amenable to either of these

## 2023-03-04 NOTE — PROGRESS NOTES
[facility]  Progress Note - Margarita Hunt 1945, 68 y o  female MRN: [id number]  Unit/Bed#: [facility] 529-01 Encounter: [id number]  Primary Care Provider: Paul Douglas MD   Date and time admitted to hospital: 3/2/2023  4:53 PM    * SV[facility]raventricular tachycardia) ([facility]  Assessment & Plan  · Recurrent episodes of SVT while hospitalized with pneumonia at [facility]  · Transferred to [facility] for evaluation by electrophysiology    · Continues to have recurrent episodes of SVT  Using incentive spirometer to help with vagal tone, IV metoprolol as needed for acute episodes  Unable to use other rate controlling strategies or antiarrhythmics prior to ablation planned for Monday 3/6  · Metoprolol tartrate increased to 50 mg twice daily  · EP planning for SVT ablation sometime Monday  · Continue to monitor on telemetry    Pneumonia  Assessment & Plan  · Initially admitted to [facility] with pneumonia and SVT  · Had previously been treated with azithromycin as an outpatient, and an additional 3 days of ceftriaxone while inpatient  · We will plan to complete a 5-day course of ceftriaxone  · Continue Mucinex      Sepsis ([facility] )  Assessment & Plan  · Criteria on admission with fever, tachycardia, and right lower lobe pneumonia diagnosed as an outpatient  · The treatment plan for pneumonia    Thyroid nodule  Assessment & Plan  · Incidental finding on CT scan 4 2 cm right thyroid nodule with mass effect on the trachea   Recommend follow-up with nonemergent thyroid ultrasound    Class 1 obesity due to excess calories with serious comorbidity and body mass index (BMI) of 32 0 to 32 9 in adult  Assessment & Plan  · Affects all aspects of her care  · Diet and lifestyle modifications recommended    Sleep apnea  Assessment & Plan  · Patient supposed to wear CPAP  · Hold due to sputum production       Dyspnea on exertion  Assessment & Plan  · Echocardiogram Systolic function is low normal  Wall motion is normal with the exception of abnormal septal motion  Diastolic function is mildly abnormal, consistent with grade I   · Patient had a cardiac cath in 2016 which was negative, he also had a stress test in 2016 and 2019, patient SVT history is since 2016  · CTA of the chest was negative for PE and pulmonary edema, pneumonia postive   · Likely due to pneumonia  · Improved with treatment; continue to monitor    VTE Pharmacologic Prophylaxis: VTE Score: 4 Moderate Risk (Score 3-4) - Pharmacological DVT Prophylaxis Ordered: enoxaparin (Lovenox)  Patient Centered Rounds: I performed bedside rounds with nursing staff today  Discussions with Specialists or Other Care Team Provider: COLUMBA     Education and Discussions with Family / Patient: Patient declined call to   Total Time Spent on Date of Encounter in care of patient: 35 minutes This time was spent on one or more of the following: performing physical exam; counseling and coordination of care; obtaining or reviewing history; documenting in the medical record; reviewing/ordering tests, medications or procedures; communicating with other healthcare professionals and discussing with patient's family/caregivers  Current Length of Stay: 2 day(s)  Current Patient Status: Inpatient   Certification Statement: The patient will continue to require additional inpatient hospital stay due to SVT ablation  Discharge Plan: Anticipate discharge in >72 hrs to home  Code Status: Level 1 - Full Code    Subjective:   Patient seen and examined  Continues to have episodes of SVT, easily breakable  She remains pretty much asymptomatic throughout these  No chest pain or shortness of breath  She is pleasant and conversive  Awaiting ablation on Monday      Objective:     Vitals:   Temp (24hrs), Av 7 °F (37 1 °C), Min:98 4 °F (36 9 °C), Max:99 1 °F (37 3 °C)    Temp:  [98 4 °F (36 9 °C)-99 1 °F (37 3 °C)] 98 6 °F (37 °C)  HR:  [] 100  Resp:  [18-20] 18  BP: ()/(53-78) 93/55  SpO2:  [89 %-93 %] 92 %  Body mass index is 32 24 kg/m²  Input and Output Summary (last 24 hours): Intake/Output Summary (Last 24 hours) at 3/4/2023 1419  Last data filed at 3/4/2023 0914  Gross per 24 hour   Intake 960 ml   Output 850 ml   Net 110 ml       PHYSICAL EXAM:    Vitals signs reviewed  Constitutional   Awake and cooperative  NAD  Head/Neck   Normocephalic  Atraumatic  HEENT   No scleral icterus  EOMI  Heart   Regular rate and rhythm  No murmers  Lungs   Clear to auscultation bilaterally  Respirations unlaboured  Abdomen   Soft  Nontender  Nondistended  Skin   Skin color normal  No rashes  Extremities   No deformities  No peripheral edema  Neuro   Alert and oriented  No new deficits  Psych   Mood stable  Affect normal          Additional Data:     Labs:  Results from last 7 days   Lab Units 03/03/23  0543 03/02/23  0530 03/01/23  0513   WBC Thousand/uL 11 40*   < > 10 46*   HEMOGLOBIN g/dL 13 0   < > 12 7   HEMATOCRIT % 38 9   < > 38 8   PLATELETS Thousands/uL 424*   < > 318   NEUTROS PCT %  --   --  60   LYMPHS PCT %  --   --  20   MONOS PCT %  --   --  16*   EOS PCT %  --   --  2    < > = values in this interval not displayed  Results from last 7 days   Lab Units 03/04/23  0451 03/02/23  0530 03/01/23  0513   SODIUM mmol/L 137   < > 136   POTASSIUM mmol/L 4 4   < > 4 0   CHLORIDE mmol/L 101   < > 101   CO2 mmol/L 29   < > 29   BUN mg/dL 13   < > 14   CREATININE mg/dL 0 48*   < > 0 48*   ANION GAP mmol/L 7   < > 6   CALCIUM mg/dL 10 0   < > 8 8   ALBUMIN g/dL  --   --  3 8   TOTAL BILIRUBIN mg/dL  --   --  0 53   ALK PHOS U/L  --   --  57   ALT U/L  --   --  15   AST U/L  --   --  20   GLUCOSE RANDOM mg/dL 141*   < > 136    < > = values in this interval not displayed       Results from last 7 days   Lab Units 02/28/23  1053   INR  1 02     Results from last 7 days   Lab Units 03/04/23  1048 03/04/23  3552 03/03/23  2158 03/03/23  1642 03/03/23  1129 03/03/23  9443 03/02/23  2103 03/02/23  1741 03/02/23  1129 03/02/23  0707 03/01/23  2135 03/01/23  1658   POC GLUCOSE mg/dl 155* 140 140 160* 109 160* 146* 100 145* 142* 140 115     Results from last 7 days   Lab Units 02/28/23  1809   HEMOGLOBIN A1C % 6 3*     Results from last 7 days   Lab Units 03/01/23  0513 02/28/23  1809 02/28/23  1053   LACTIC ACID mmol/L  --  0 9  --    PROCALCITONIN ng/ml 0 13  --  0 10       Lines/Drains:  Invasive Devices     Peripheral Intravenous Line  Duration           Peripheral IV 03/02/23 Right Antecubital 1 day                  Telemetry:  Telemetry Orders (From admission, onward)             48 Hour Telemetry Monitoring  Continuous x 48 hours        References:    Telemetry Guidelines   Question:  Reason for 48 Hour Telemetry  Answer:  Arrhythmias Requiring Medical Therapy (eg  SVT, Vtach/fib, Bradycardia, Uncontrolled A-fib)                 Telemetry Reviewed: Sinus rhythm with runs of SVT  Indication for Continued Telemetry Use: Arrthymias requiring medical therapy             Imaging: No pertinent imaging reviewed  Recent Cultures (last 7 days):   Results from last 7 days   Lab Units 03/01/23  0754 02/28/23  2348 02/28/23  1810 02/28/23  1758   BLOOD CULTURE   --   --  No Growth at 72 hrs  No Growth at 72 hrs  SPUTUM CULTURE  3+ Growth of  Commensal respiratory [place] only; No significant growth of Staph aureus/MRSA or Pseudomonas aeruginosa    --   --   --    GRAM STAIN RESULT  1+ Epithelial Cells*  2+ Polys*  1+ Gram positive cocci in pairs*  1+ Gram variable rods*  --   --   --    LEGIONELLA URINARY ANTIGEN   --  Negative  --   --        Last 24 Hours Medication List:   Current Facility-Administered Medications   Medication Dose Route Frequency Provider Last Rate   • acetaminophen  650 mg Oral Q6H PRN Aubree Maya MD     • aspirin  81 mg Oral Daily Aubree Maya MD     • atorvastatin  40 mg Oral Daily With Everett Trevino MD     • benzonatate  100 mg Oral TID PRN Candy Olivares MD     • cefTRIAXone  1,000 mg Intravenous Q24H Alexei Wellington DO 1,000 mg (03/03/23 1314)   • enoxaparin  40 mg Subcutaneous Daily Candy Olivares MD     • guaiFENesin  600 mg Oral Q12H [address] Candy Olivares MD     • insulin lispro  1-6 Units Subcutaneous TID AC Candy Olivares MD     • insulin lispro  1-6 Units Subcutaneous HS Candy Olivares MD     • levalbuterol  1 25 mg Nebulization Q4H PRN Candy Olivares MD     • metoprolol  5 mg Intravenous Q6H PRN Candy Olivares MD     • metoprolol tartrate  50 mg Oral Q12H [address] Christina Lyons PA-C     • ondansetron  4 mg Intravenous Q6H PRN Candy Olivares MD     • senna-docusate sodium  1 tablet Oral HS Candy Olivares MD          Today, Patient Was Seen By: Alexei Wellington DO    **Please Note: This note may have been constructed using a voice recognition system  **

## 2023-03-04 NOTE — PLAN OF CARE
Problem: PAIN - ADULT  Goal: Verbalizes/displays adequate comfort level or baseline comfort level  Description: Interventions:  - Encourage patient to monitor pain and request assistance  - Assess pain using appropriate pain scale  - Administer analgesics based on type and severity of pain and evaluate response  - Implement non-pharmacological measures as appropriate and evaluate response  - Consider cultural and social influences on pain and pain management  - Notify physician/advanced practitioner if interventions unsuccessful or patient reports new pain  Outcome: Progressing     Problem: INFECTION - ADULT  Goal: Absence or prevention of progression during hospitalization  Description: INTERVENTIONS:  - Assess and monitor for signs and symptoms of infection  - Monitor lab/diagnostic results  - Monitor all insertion sites, i e  indwelling lines, tubes, and drains  - Monitor endotracheal if appropriate and nasal secretions for changes in amount and color  - Milo appropriate cooling/warming therapies per order  - Administer medications as ordered  - Instruct and encourage patient and family to use good hand hygiene technique  - Identify and instruct in appropriate isolation precautions for identified infection/condition  Outcome: Progressing  Goal: Absence of fever/infection during neutropenic period  Description: INTERVENTIONS:  - Monitor WBC    Outcome: Progressing     Problem: SAFETY ADULT  Goal: Patient will remain free of falls  Description: INTERVENTIONS:  - Educate patient/family on patient safety including physical limitations  - Instruct patient to call for assistance with activity   - Consult OT/PT to assist with strengthening/mobility   - Keep Call bell within reach  - Keep bed low and locked with side rails adjusted as appropriate  - Keep care items and personal belongings within reach  - Initiate and maintain comfort rounds  - Make Fall Risk Sign visible to staff  - Apply yellow socks and bracelet for high fall risk patients  - Consider moving patient to room near nurses station  Outcome: Progressing  Goal: Maintain or return to baseline ADL function  Description: INTERVENTIONS:  -  Assess patient's ability to carry out ADLs; assess patient's baseline for ADL function and identify physical deficits which impact ability to perform ADLs (bathing, care of mouth/teeth, toileting, grooming, dressing, etc )  - Assess/evaluate cause of self-care deficits   - Assess range of motion  - Assess patient's mobility; develop plan if impaired  - Assess patient's need for assistive devices and provide as appropriate  - Encourage maximum independence but intervene and supervise when necessary  - Involve family in performance of ADLs  - Assess for home care needs following discharge   - Consider OT consult to assist with ADL evaluation and planning for discharge  - Provide patient education as appropriate  Outcome: Progressing  Goal: Maintains/Returns to pre admission functional level  Description: INTERVENTIONS:  - Perform BMAT or MOVE assessment daily    - Set and communicate daily mobility goal to care team and patient/family/caregiver  - Collaborate with rehabilitation services on mobility goals if consulted  - Perform Range of Motion 3 times a day  - Reposition patient every 3 hours    - Dangle patient 3 times a day  - Stand patient 3 times a day  - Ambulate patient 3 times a day  - Out of bed to chair 3 times a day   - Out of bed for meals 3 times a day  - Out of bed for toileting  - Record patient progress and toleration of activity level   Outcome: Progressing     Problem: DISCHARGE PLANNING  Goal: Discharge to home or other facility with appropriate resources  Description: INTERVENTIONS:  - Identify barriers to discharge w/patient and caregiver  - Arrange for needed discharge resources and transportation as appropriate  - Identify discharge learning needs (meds, wound care, etc )  - Arrange for interpretive services to assist at discharge as needed  - Refer to Case Management Department for coordinating discharge planning if the patient needs post-hospital services based on physician/advanced practitioner order or complex needs related to functional status, cognitive ability, or social support system  Outcome: Progressing     Problem: Knowledge Deficit  Goal: Patient/family/caregiver demonstrates understanding of disease process, treatment plan, medications, and discharge instructions  Description: Complete learning assessment and assess knowledge base    Interventions:  - Provide teaching at level of understanding  - Provide teaching via preferred learning methods  Outcome: Progressing

## 2023-03-04 NOTE — ASSESSMENT & PLAN NOTE
· Initially admitted to [facility] with pneumonia and SVT  · Completed 5-day course of IV ceftriaxone while inpatient   · Continue Mucinex

## 2023-03-04 NOTE — ASSESSMENT & PLAN NOTE
· Recurrent episodes of SVT while hospitalized with pneumonia at [facility]  · Transferred to [facility] for evaluation by electrophysiology  · Status post SVT ablation 3/6  · Cardiology adding Cardizem, continue to monitor heart rate and also monitor anemia overnight  · Continue to monitor on telemetry

## 2023-03-04 NOTE — PROGRESS NOTES
Progress Note - Electrophysiology-Cardiology (EP)   Arnetha Mcardle 68 y o  female MRN: [id number]  Unit/Bed#: [facility] 529-01 Encounter: [id number]      Assessment:  1  Recurrent SVT, maintained on Lopressor as an outpatient   A ) episodes noted during admission - responded to vagal maneuvers (blowing into incentive spirometer) and IV lopressor   B ) maintained on lopressor 50 mg BID this admission  2  Low normal LV systolic function with EF 50% per echo this admission  3  No obstructive CAD per cardiac catheterization in 2016  4  Hypertension  5  Dyslipidemia  6  Diabetes mellitus type 2  7  Obesity with BMI 32  8  Obstructive sleep apnea, maintained on CPAP  9  Pneumonia with sepsis, maintained on ceftriaxone      Plan: Will try to arrange her SVT ablation for Monday, 3/6/2023  The procedure was explained to the patient and all questions were answered  NPO Sunday at midnight  Continue to monitor telemetry and electrolytes  Continue oral lopressor for now, will give a dose tomorrow morning and then hold for upcoming ablation  Can continue IV lopressor as needed for recurrent SVT  Subjective/Objective   Chief Complaint: no complaints     Subjective: She is feeling well today, has noted several palpitations but no significant recurrent SVT  She otherwise denies chest pain or pressure, shortness of breath, dizziness, or lightheadedness        Objective:     Vitals: /72   Pulse 93   Temp 99 1 °F (37 3 °C)   Resp 18   Ht 5' 4" (1 626 m)   Wt 85 2 kg (187 lb 13 3 oz)   SpO2 (!) 89%   BMI 32 24 kg/m²   Vitals:    03/03/23 0600 03/04/23 0513   Weight: 86 1 kg (189 lb 13 1 oz) 85 2 kg (187 lb 13 3 oz)     Orthostatic Blood Pressures    Flowsheet Row Most Recent Value   Blood Pressure 111/72 filed at 03/04/2023 0820   Patient Position - Orthostatic VS Sitting filed at 03/03/2023 1157            Intake/Output Summary (Last 24 hours) at 3/4/2023 0914  Last data filed at 3/4/2023 8698  Gross per 24 hour   Intake 960 ml   Output 1000 ml   Net -40 ml       Invasive Devices     Peripheral Intravenous Line  Duration           Peripheral IV 03/02/23 Right Antecubital 1 day                            Scheduled Meds:  Current Facility-Administered Medications   Medication Dose Route Frequency Provider Last Rate   • acetaminophen  650 mg Oral Q6H PRN Washington Murrieta MD     • aspirin  81 mg Oral Daily Washington Murrieta MD     • atorvastatin  40 mg Oral Daily With Mandi Mishra MD     • benzonatate  100 mg Oral TID PRN Washington Murrieta MD     • cefTRIAXone  1,000 mg Intravenous Q24H Kal Geraldine Starsinic, DO 1,000 mg (03/03/23 1651)   • enoxaparin  40 mg Subcutaneous Daily Washington Murrieta MD     • guaiFENesin  600 mg Oral Q12H [facility] Washington Murrieta MD     • insulin lispro  1-6 Units Subcutaneous TID AC Washington Murrieta MD     • insulin lispro  1-6 Units Subcutaneous HS Washington Murrieta MD     • levalbuterol  1 25 mg Nebulization Q4H PRN Washington Murrieta MD     • metoprolol  5 mg Intravenous Q6H PRN Washington Murrieta MD     • metoprolol tartrate  50 mg Oral Q12H [facility] Hima Islas PA-C     • ondansetron  4 mg Intravenous Q6H PRN Washington Murrieta MD     • senna-docusate sodium  1 tablet Oral HS Washington Murrieta MD       Continuous Infusions:   PRN Meds: •  acetaminophen  •  benzonatate  •  levalbuterol  •  metoprolol  •  ondansetron    Review of Systems   Constitutional: Negative for fever and malaise/fatigue  Cardiovascular: Negative for chest pain, dyspnea on exertion, irregular heartbeat, leg swelling, near-syncope, orthopnea, palpitations, paroxysmal nocturnal dyspnea and syncope  All other systems reviewed and are negative        Physical Exam:   GEN: NAD, alert and oriented x 3, well appearing  SKIN: dry without significant lesions or rashes  HEENT: NCAT, PERRL, EOMs intact  NECK: No JVD appreciated  CARDIOVASCULAR: RRR, normal S1, S2 without murmurs, rubs, or gallops appreciated  LUNGS: Clear to auscultation bilaterally without wheezes, rhonchi, or rales  ABDOMEN: Soft, nontender, nondistended  EXTREMITIES/VASCULAR: perfused without clubbing, cyanosis, or LE edema b/l  PSYCH: Normal mood and affect  NEURO: CN ll-Xll grossly intact                Lab Results: I have personally reviewed pertinent lab results  Results from last 7 days   Lab Units 03/03/23  0543 03/02/23  0530 03/01/23  0513   WBC Thousand/uL 11 40* 11 66* 10 46*   HEMOGLOBIN g/dL 13 0 12 3 12 7   HEMATOCRIT % 38 9 37 0 38 8   PLATELETS Thousands/uL 424* 333 318     Results from last 7 days   Lab Units 03/04/23  0451 03/03/23  0543 03/02/23  0530   POTASSIUM mmol/L 4 4 4 0 3 9   CHLORIDE mmol/L 101 102 101   CO2 mmol/L 29 28 31   BUN mg/dL 13 14 10   CREATININE mg/dL 0 48* 0 47* 0 42*   CALCIUM mg/dL 10 0 9 8 9 0     Results from last 7 days   Lab Units 02/28/23  1053   INR  1 02   PTT seconds 29     Results from last 7 days   Lab Units 03/04/23  0451 03/03/23  0543 03/02/23  0530   MAGNESIUM mg/dL 2 5 2 4 2 2         Imaging: I have personally reviewed pertinent reports  No results found for this or any previous visit        EKG/TELEMETRY: several runs of likely PAT          VTE Pharmacologic Prophylaxis: Enoxaparin (Lovenox)

## 2023-03-05 PROBLEM — A41.9 SEPSIS (HCC): Status: RESOLVED | Noted: 2023-02-28 | Resolved: 2023-03-05

## 2023-03-05 LAB
BACTERIA BLD CULT: NORMAL
BACTERIA BLD CULT: NORMAL
GLUCOSE SERPL-MCNC: 112 MG/DL (ref 65–140)
GLUCOSE SERPL-MCNC: 138 MG/DL (ref 65–140)
GLUCOSE SERPL-MCNC: 140 MG/DL (ref 65–140)
GLUCOSE SERPL-MCNC: 157 MG/DL (ref 65–140)

## 2023-03-05 RX ORDER — METOPROLOL TARTRATE 5 MG/5ML
2.5 INJECTION INTRAVENOUS ONCE
Status: COMPLETED | OUTPATIENT
Start: 2023-03-05 | End: 2023-03-05

## 2023-03-05 RX ORDER — SODIUM CHLORIDE 9 MG/ML
250 INJECTION, SOLUTION INTRAVENOUS CONTINUOUS
Status: DISCONTINUED | OUTPATIENT
Start: 2023-03-05 | End: 2023-03-07

## 2023-03-05 RX ORDER — DILTIAZEM HYDROCHLORIDE 5 MG/ML
10 INJECTION INTRAVENOUS ONCE
Status: COMPLETED | OUTPATIENT
Start: 2023-03-05 | End: 2023-03-05

## 2023-03-05 RX ADMIN — SODIUM CHLORIDE 250 ML/HR: 0.9 INJECTION, SOLUTION INTRAVENOUS at 22:12

## 2023-03-05 RX ADMIN — ASPIRIN 81 MG: 81 TABLET, COATED ORAL at 08:25

## 2023-03-05 RX ADMIN — SODIUM CHLORIDE 250 ML/HR: 0.9 INJECTION, SOLUTION INTRAVENOUS at 18:38

## 2023-03-05 RX ADMIN — INSULIN LISPRO 1 UNITS: 100 INJECTION, SOLUTION INTRAVENOUS; SUBCUTANEOUS at 17:13

## 2023-03-05 RX ADMIN — DILTIAZEM HYDROCHLORIDE 10 MG: 5 INJECTION INTRAVENOUS at 18:29

## 2023-03-05 RX ADMIN — GUAIFENESIN 600 MG: 600 TABLET, EXTENDED RELEASE ORAL at 08:25

## 2023-03-05 RX ADMIN — ENOXAPARIN SODIUM 40 MG: 40 INJECTION SUBCUTANEOUS at 08:25

## 2023-03-05 RX ADMIN — METOROPROLOL TARTRATE 5 MG: 5 INJECTION, SOLUTION INTRAVENOUS at 12:29

## 2023-03-05 RX ADMIN — METOPROLOL TARTRATE 2.5 MG: 5 INJECTION, SOLUTION INTRAVENOUS at 17:20

## 2023-03-05 RX ADMIN — GUAIFENESIN 600 MG: 600 TABLET, EXTENDED RELEASE ORAL at 21:06

## 2023-03-05 RX ADMIN — ATORVASTATIN CALCIUM 40 MG: 40 TABLET, FILM COATED ORAL at 17:13

## 2023-03-05 RX ADMIN — METOPROLOL TARTRATE 50 MG: 50 TABLET, FILM COATED ORAL at 08:25

## 2023-03-05 NOTE — PROGRESS NOTES
Progress Note - Electrophysiology-Cardiology (EP)   Evelyn Sen 68 y o  female MRN: [id number]  Unit/Bed#: [facility] 529-01 Encounter: [id number]      Assessment:  1   Recurrent SVT, maintained on Lopressor as an outpatient              A ) episodes noted during admission - responded to vagal maneuvers (blowing into incentive spirometer) and IV lopressor              B ) maintained on lopressor 50 mg BID this admission - now held  2   Low normal LV systolic function with EF 50% per echo this admission  3   No obstructive CAD per cardiac catheterization in 2016  4   Hypertension  5   Dyslipidemia  6   Diabetes mellitus type 2  7   Obesity with BMI 32  8   Obstructive sleep apnea, maintained on CPAP  9   Pneumonia with sepsis, maintained on ceftriaxone      Plan:  She continues to have recurrent episodes of SVT, relatively well-tolerated and she remains hemodynamically stable throughout  Recommend continued use of IS and PRN IV Lopressor to treat  We are discontinuing her oral Lopressor in preparation for her upcoming ablation tomorrow, 3/6/2023  N p o  at midnight, check a m  labs, continue to monitor telemetry  All questions answered, patient in agreement with this plan  She reports an ongoing cough from her pneumonia, and is concerned about having to lay flat tomorrow  I will pass this on to the performing physician/anesthesia, however I don't think it should impact our procedure tomorrow  Subjective/Objective   Chief Complaint: Ongoing SVT    Subjective: She continues to report episodes of SVT, associated with palpitations  She denies associated chest pain, shortness of breath, dizziness, lightheadedness, presyncope, or syncope  Otherwise she is stable at baseline  She does have an ongoing cough due to her recent pneumonia, and thus has not been laying flat        Objective:     Vitals: /72   Pulse 93   Temp 99 1 °F (37 3 °C)   Resp 20   Ht 5' 4" (1 626 m)   Wt 85 2 kg (187 lb 13 3 oz)   SpO2 92%   BMI 32 24 kg/m²   Vitals:    03/03/23 0600 03/04/23 0513   Weight: 86 1 kg (189 lb 13 1 oz) 85 2 kg (187 lb 13 3 oz)     Orthostatic Blood Pressures    Flowsheet Row Most Recent Value   Blood Pressure 124/72 filed at 03/05/2023 7006   Patient Position - Orthostatic VS Lying filed at 03/05/2023 0343            Intake/Output Summary (Last 24 hours) at 3/5/2023 5512  Last data filed at 3/5/2023 2355  Gross per 24 hour   Intake 1010 ml   Output --   Net 1010 ml       Invasive Devices     Peripheral Intravenous Line  Duration           Peripheral IV 03/04/23 Left;Ventral (anterior) Forearm <1 day                            Scheduled Meds:  Current Facility-Administered Medications   Medication Dose Route Frequency Provider Last Rate   • acetaminophen  650 mg Oral Q6H PRN Lisa Shelby MD     • aspirin  81 mg Oral Daily Lisa Shelby MD     • atorvastatin  40 mg Oral Daily With Manuel Dillon MD     • benzonatate  100 mg Oral TID PRN Lisa Shelby MD     • enoxaparin  40 mg Subcutaneous Daily Lisa Shelby MD     • guaiFENesin  600 mg Oral Q12H Karey Srinivasan MD     • insulin lispro  1-6 Units Subcutaneous TID AC Lisa Shelby MD     • insulin lispro  1-6 Units Subcutaneous HS Lisa Shelby MD     • levalbuterol  1 25 mg Nebulization Q4H PRN Lisa Shelby MD     • metoprolol  5 mg Intravenous Q6H PRN Lisa Shelby MD     • metoprolol tartrate  50 mg Oral Q12H [address] Serene Kee PA-C     • ondansetron  4 mg Intravenous Q6H PRN Lisa Shelby MD     • senna-docusate sodium  1 tablet Oral HS Lisa Shelby MD       Continuous Infusions:   PRN Meds: •  acetaminophen  •  benzonatate  •  levalbuterol  •  metoprolol  •  ondansetron    Review of Systems   Constitutional: Negative for fever and malaise/fatigue  Cardiovascular: Positive for irregular heartbeat and palpitations   Negative for chest pain, dyspnea on exertion, leg swelling, near-syncope, orthopnea, paroxysmal nocturnal dyspnea and syncope  Respiratory: Positive for cough  All other systems reviewed and are negative  Physical Exam:   GEN: NAD, alert and oriented x 3, well appearing  SKIN: dry without significant lesions or rashes  HEENT: NCAT, PERRL, EOMs intact  NECK: No JVD appreciated  CARDIOVASCULAR: RRR, normal S1, S2 without murmurs, rubs, or gallops appreciated  LUNGS: Clear to auscultation bilaterally without wheezes, rhonchi, or rales  ABDOMEN: Soft, nontender, nondistended  EXTREMITIES/VASCULAR: perfused without clubbing, cyanosis, or LE edema b/l  PSYCH: Normal mood and affect  NEURO: CN ll-Xll grossly intact                Lab Results: I have personally reviewed pertinent lab results  Results from last 7 days   Lab Units 03/03/23  0543 03/02/23  0530 03/01/23  0513   WBC Thousand/uL 11 40* 11 66* 10 46*   HEMOGLOBIN g/dL 13 0 12 3 12 7   HEMATOCRIT % 38 9 37 0 38 8   PLATELETS Thousands/uL 424* 333 318     Results from last 7 days   Lab Units 03/04/23  0451 03/03/23  0543 03/02/23  0530   POTASSIUM mmol/L 4 4 4 0 3 9   CHLORIDE mmol/L 101 102 101   CO2 mmol/L 29 28 31   BUN mg/dL 13 14 10   CREATININE mg/dL 0 48* 0 47* 0 42*   CALCIUM mg/dL 10 0 9 8 9 0     Results from last 7 days   Lab Units 02/28/23  1053   INR  1 02   PTT seconds 29     Results from last 7 days   Lab Units 03/04/23  0451 03/03/23  0543 03/02/23  0530   MAGNESIUM mg/dL 2 5 2 4 2 2         Imaging: I have personally reviewed pertinent reports  No results found for this or any previous visit        EKG/TELEMETRY: Ongoing paroxysmal SVT, occasional episodes of wide-complex tachycardia as previously noted      VTE Pharmacologic Prophylaxis: Enoxaparin (Lovenox)

## 2023-03-05 NOTE — PLAN OF CARE
Problem: INFECTION - ADULT  Goal: Absence or prevention of progression during hospitalization  Description: INTERVENTIONS:  - Assess and monitor for signs and symptoms of infection  - Monitor lab/diagnostic results  - Monitor all insertion sites, i e  indwelling lines, tubes, and drains  - Monitor endotracheal if appropriate and nasal secretions for changes in amount and color  - Keno appropriate cooling/warming therapies per order  - Administer medications as ordered  - Instruct and encourage patient and family to use good hand hygiene technique  - Identify and instruct in appropriate isolation precautions for identified infection/condition  Outcome: Progressing     Problem: INFECTION - ADULT  Goal: Absence of fever/infection during neutropenic period  Description: INTERVENTIONS:  - Monitor WBC    Outcome: Progressing     Problem: SAFETY ADULT  Goal: Patient will remain free of falls  Description: INTERVENTIONS:  - Educate patient/family on patient safety including physical limitations  - Instruct patient to call for assistance with activity   - Consult OT/PT to assist with strengthening/mobility   - Keep Call bell within reach  - Keep bed low and locked with side rails adjusted as appropriate  - Keep care items and personal belongings within reach  - Initiate and maintain comfort rounds  - Make Fall Risk Sign visible to staff  - Apply yellow socks and bracelet for high fall risk patients  - Consider moving patient to room near nurses station  Outcome: Progressing

## 2023-03-05 NOTE — PROGRESS NOTES
[facility]  Progress Note - Jana Jamil 1945, 68 y o  female MRN: [id number]  Unit/Bed#: [facility] 529-01 Encounter: [id number]  Primary Care Provider: John Raza MD   Date and time admitted to hospital: 3/2/2023  4:53 PM    * SVT (supraventricular tachycardia) (HCC)  Assessment & Plan  · Recurrent episodes of SVT while hospitalized with pneumonia at [facility]  · Transferred to [facility] for evaluation by electrophysiology    · Continues to have recurrent episodes of SVT; continue with vagal maneuvers as needed  · Metoprolol tartrate now being held in anticipation of EP study and ablation tomorrow  · N p o  midnight for procedure tomorrow  · Continue to monitor on telemetry    Pneumonia  Assessment & Plan  · Initially admitted to [facility] with pneumonia and SVT  · Completed 5-day course of IV ceftriaxone while inpatient   · Continue Mucinex      Sepsis (HCC)-resolved as of 3/5/2023  Assessment & Plan  · Criteria on admission with fever, tachycardia, and right lower lobe pneumonia diagnosed as an outpatient  · The treatment plan for pneumonia    Thyroid nodule  Assessment & Plan  · Incidental finding on CT scan 4 2 cm right thyroid nodule with mass effect on the trachea   Recommend follow-up with nonemergent thyroid ultrasound    Class 1 obesity due to excess calories with serious comorbidity and body mass index (BMI) of 32 0 to 32 9 in adult  Assessment & Plan  · Affects all aspects of her care  · Diet and lifestyle modifications recommended    Sleep apnea  Assessment & Plan  · CPAP QHS    Dyspnea on exertion  Assessment & Plan  · Echocardiogram Systolic function is low normal  Wall motion is normal with the exception of abnormal septal motion   Diastolic function is mildly abnormal, consistent with grade I   · Patient had a cardiac cath in 2016 which was negative, he also had a stress test in 2016 and 2019, patient SVT history is since 2016    · CTA of the chest was negative for PE and pulmonary edema, pneumonia postive   · Likely due to pneumonia  · Improved with treatment; continue to monitor    VTE Pharmacologic Prophylaxis: VTE Score: 4 Moderate Risk (Score 3-4) - Pharmacological DVT Prophylaxis Ordered: enoxaparin (Lovenox)  Patient Centered Rounds: I performed bedside rounds with nursing staff today  Discussions with Specialists or Other Care Team Provider: COLUMBA     Education and Discussions with Family / Patient: Patient declined call to   Total Time Spent on Date of Encounter in care of patient: 35 minutes This time was spent on one or more of the following: performing physical exam; counseling and coordination of care; obtaining or reviewing history; documenting in the medical record; reviewing/ordering tests, medications or procedures; communicating with other healthcare professionals and discussing with patient's family/caregivers  Current Length of Stay: 3 day(s)  Current Patient Status: Inpatient   Certification Statement: The patient will continue to require additional inpatient hospital stay due to SVT ablation  Discharge Plan: Anticipate discharge in >72 hrs to home  Code Status: Level 1 - Full Code    Subjective:   Patient seen and examined  Continues to have intermittent episodes of SVT  Remains asymptomatic  Otherwise doing fine  Ablation tomorrow  Objective:     Vitals:   Temp (24hrs), Av 3 °F (36 8 °C), Min:97 9 °F (36 6 °C), Max:99 1 °F (37 3 °C)    Temp:  [97 9 °F (36 6 °C)-99 1 °F (37 3 °C)] 98 1 °F (36 7 °C)  HR:  [] 98  Resp:  [17-20] 18  BP: ()/(55-92) 116/76  SpO2:  [90 %-95 %] 92 %  Body mass index is 32 24 kg/m²  Input and Output Summary (last 24 hours):      Intake/Output Summary (Last 24 hours) at 3/5/2023 1101  Last data filed at 3/5/2023 0858  Gross per 24 hour   Intake 1010 ml   Output --   Net 1010 ml       PHYSICAL EXAM:    Vitals signs reviewed  Constitutional Awake and cooperative  NAD  Head/Neck   Normocephalic  Atraumatic  HEENT   No scleral icterus  EOMI  Heart   Regular rate and rhythm  No murmers  Lungs   Clear to auscultation bilaterally  Respirations unlaboured  Abdomen   Soft  Nontender  Nondistended  Skin   Skin color normal  No rashes  Extremities   No deformities  No peripheral edema  Neuro   Alert and oriented  No new deficits  Psych   Mood stable  Affect normal          Additional Data:     Labs:  Results from last 7 days   Lab Units 03/03/23  0543 03/02/23  0530 03/01/23  0513   WBC Thousand/uL 11 40*   < > 10 46*   HEMOGLOBIN g/dL 13 0   < > 12 7   HEMATOCRIT % 38 9   < > 38 8   PLATELETS Thousands/uL 424*   < > 318   NEUTROS PCT %  --   --  60   LYMPHS PCT %  --   --  20   MONOS PCT %  --   --  16*   EOS PCT %  --   --  2    < > = values in this interval not displayed  Results from last 7 days   Lab Units 03/04/23  0451 03/02/23  0530 03/01/23  0513   SODIUM mmol/L 137   < > 136   POTASSIUM mmol/L 4 4   < > 4 0   CHLORIDE mmol/L 101   < > 101   CO2 mmol/L 29   < > 29   BUN mg/dL 13   < > 14   CREATININE mg/dL 0 48*   < > 0 48*   ANION GAP mmol/L 7   < > 6   CALCIUM mg/dL 10 0   < > 8 8   ALBUMIN g/dL  --   --  3 8   TOTAL BILIRUBIN mg/dL  --   --  0 53   ALK PHOS U/L  --   --  57   ALT U/L  --   --  15   AST U/L  --   --  20   GLUCOSE RANDOM mg/dL 141*   < > 136    < > = values in this interval not displayed       Results from last 7 days   Lab Units 02/28/23  1053   INR  1 02     Results from last 7 days   Lab Units 03/05/23  1039 03/05/23  0628 03/04/23  2052 03/04/23  1602 03/04/23  1043 03/04/23  0619 03/03/23  2158 03/03/23  1642 03/03/23  1129 03/03/23  0613 03/02/23  2103 03/02/23  1741   POC GLUCOSE mg/dl 138 140 137 123 155* 140 140 160* 109 160* 146* 100     Results from last 7 days   Lab Units 02/28/23  1809   HEMOGLOBIN A1C % 6 3*     Results from last 7 days   Lab Units 03/01/23  0513 02/28/23  1809 02/28/23  1053 LACTIC ACID mmol/L  --  0 9  --    PROCALCITONIN ng/ml 0 13  --  0 10       Lines/Drains:  Invasive Devices     Peripheral Intravenous Line  Duration           Peripheral IV 03/04/23 Left;Ventral (anterior) Forearm <1 day                  Telemetry:  Telemetry Orders (From admission, onward)             48 Hour Telemetry Monitoring  Continuous x 48 hours        References:    Telemetry Guidelines   Question:  Reason for 48 Hour Telemetry  Answer:  Arrhythmias Requiring Medical Therapy (eg  SVT, Vtach/fib, Bradycardia, Uncontrolled A-fib)                 Telemetry Reviewed: Sinus rhythm with runs of SVT  Indication for Continued Telemetry Use: Arrthymias requiring medical therapy             Imaging: No pertinent imaging reviewed  Recent Cultures (last 7 days):   Results from last 7 days   Lab Units 03/01/23  0754 02/28/23  2348 02/28/23  1810 02/28/23  1758   BLOOD CULTURE   --   --  No Growth After 4 Days  No Growth After 4 Days  SPUTUM CULTURE  3+ Growth of  Commensal respiratory [place] only; No significant growth of Staph aureus/MRSA or Pseudomonas aeruginosa    --   --   --    GRAM STAIN RESULT  1+ Epithelial Cells*  2+ Polys*  1+ Gram positive cocci in pairs*  1+ Gram variable rods*  --   --   --    LEGIONELLA URINARY ANTIGEN   --  Negative  --   --        Last 24 Hours Medication List:   Current Facility-Administered Medications   Medication Dose Route Frequency Provider Last Rate   • acetaminophen  650 mg Oral Q6H PRN Brandon Tejeda MD     • aspirin  81 mg Oral Daily Brandon Tejeda MD     • atorvastatin  40 mg Oral Daily With Jovanni Torres MD     • benzonatate  100 mg Oral TID PRN Brandon Tejeda MD     • enoxaparin  40 mg Subcutaneous Daily Brandon Tejeda MD     • guaiFENesin  600 mg Oral Q12H [address] Brandon Tejeda MD     • insulin lispro  1-6 Units Subcutaneous TID AC Brandon Tejeda MD     • insulin lispro  1-6 Units Subcutaneous HS Brandon Tejeda MD     • levalbuterol  1 25 mg Nebulization Q4H PRN Miriam Haq MD     • metoprolol  5 mg Intravenous Q6H PRN Miriam Haq MD     • ondansetron  4 mg Intravenous Q6H PRN Miriam Haq MD     • senna-docusate sodium  1 tablet Oral HS Miriam Haq MD          Today, Patient Was Seen By: Case Wellington DO    **Please Note: This note may have been constructed using a voice recognition system  **

## 2023-03-06 ENCOUNTER — ANESTHESIA (INPATIENT)
Dept: NON INVASIVE DIAGNOSTICS | Facility: HOSPITAL | Age: 78
DRG: 273 | End: 2023-03-06
Payer: COMMERCIAL

## 2023-03-06 PROBLEM — G47.33 OSA (OBSTRUCTIVE SLEEP APNEA): Status: ACTIVE | Noted: 2023-02-28

## 2023-03-06 LAB
ANION GAP SERPL CALCULATED.3IONS-SCNC: 3 MMOL/L (ref 4–13)
ATRIAL RATE: 105 BPM
BASOPHILS # BLD AUTO: 0.11 THOUSANDS/ÂΜL (ref 0–0.1)
BASOPHILS NFR BLD AUTO: 1 % (ref 0–1)
BUN SERPL-MCNC: 15 MG/DL (ref 5–25)
CALCIUM SERPL-MCNC: 9.2 MG/DL (ref 8.3–10.1)
CHLORIDE SERPL-SCNC: 108 MMOL/L (ref 96–108)
CO2 SERPL-SCNC: 27 MMOL/L (ref 21–32)
CREAT SERPL-MCNC: 0.53 MG/DL (ref 0.6–1.3)
EOSINOPHIL # BLD AUTO: 0.42 THOUSAND/ÂΜL (ref 0–0.61)
EOSINOPHIL NFR BLD AUTO: 4 % (ref 0–6)
ERYTHROCYTE [DISTWIDTH] IN BLOOD BY AUTOMATED COUNT: 11.7 % (ref 11.6–15.1)
GFR SERPL CREATININE-BSD FRML MDRD: 91 ML/MIN/1.73SQ M
GLUCOSE SERPL-MCNC: 110 MG/DL (ref 65–140)
GLUCOSE SERPL-MCNC: 130 MG/DL (ref 65–140)
GLUCOSE SERPL-MCNC: 141 MG/DL (ref 65–140)
GLUCOSE SERPL-MCNC: 154 MG/DL (ref 65–140)
HCT VFR BLD AUTO: 40.8 % (ref 34.8–46.1)
HGB BLD-MCNC: 13.3 G/DL (ref 11.5–15.4)
IMM GRANULOCYTES # BLD AUTO: 0.15 THOUSAND/UL (ref 0–0.2)
IMM GRANULOCYTES NFR BLD AUTO: 1 % (ref 0–2)
LYMPHOCYTES # BLD AUTO: 2.62 THOUSANDS/ÂΜL (ref 0.6–4.47)
LYMPHOCYTES NFR BLD AUTO: 23 % (ref 14–44)
MAGNESIUM SERPL-MCNC: 2.4 MG/DL (ref 1.6–2.6)
MCH RBC QN AUTO: 32.7 PG (ref 26.8–34.3)
MCHC RBC AUTO-ENTMCNC: 32.6 G/DL (ref 31.4–37.4)
MCV RBC AUTO: 100 FL (ref 82–98)
MONOCYTES # BLD AUTO: 1.07 THOUSAND/ÂΜL (ref 0.17–1.22)
MONOCYTES NFR BLD AUTO: 10 % (ref 4–12)
NEUTROPHILS # BLD AUTO: 6.93 THOUSANDS/ÂΜL (ref 1.85–7.62)
NEUTS SEG NFR BLD AUTO: 61 % (ref 43–75)
NRBC BLD AUTO-RTO: 0 /100 WBCS
P AXIS: 50 DEGREES
PLATELET # BLD AUTO: 476 THOUSANDS/UL (ref 149–390)
PMV BLD AUTO: 9 FL (ref 8.9–12.7)
POTASSIUM SERPL-SCNC: 4.1 MMOL/L (ref 3.5–5.3)
PR INTERVAL: 112 MS
QRS AXIS: 5 DEGREES
QRSD INTERVAL: 86 MS
QT INTERVAL: 336 MS
QTC INTERVAL: 444 MS
RBC # BLD AUTO: 4.07 MILLION/UL (ref 3.81–5.12)
SODIUM SERPL-SCNC: 138 MMOL/L (ref 135–147)
T WAVE AXIS: 26 DEGREES
VENTRICULAR RATE: 105 BPM
WBC # BLD AUTO: 11.3 THOUSAND/UL (ref 4.31–10.16)

## 2023-03-06 PROCEDURE — 02583ZZ DESTRUCTION OF CONDUCTION MECHANISM, PERCUTANEOUS APPROACH: ICD-10-PCS | Performed by: INTERNAL MEDICINE

## 2023-03-06 RX ORDER — POLYETHYLENE GLYCOL 3350 17 G/17G
17 POWDER, FOR SOLUTION ORAL DAILY PRN
Status: DISCONTINUED | OUTPATIENT
Start: 2023-03-06 | End: 2023-03-08 | Stop reason: HOSPADM

## 2023-03-06 RX ORDER — MIDAZOLAM HYDROCHLORIDE 2 MG/2ML
INJECTION, SOLUTION INTRAMUSCULAR; INTRAVENOUS AS NEEDED
Status: DISCONTINUED | OUTPATIENT
Start: 2023-03-06 | End: 2023-03-06

## 2023-03-06 RX ORDER — OXYCODONE HYDROCHLORIDE 5 MG/1
5 TABLET ORAL EVERY 4 HOURS PRN
Status: DISCONTINUED | OUTPATIENT
Start: 2023-03-06 | End: 2023-03-08 | Stop reason: HOSPADM

## 2023-03-06 RX ORDER — FENTANYL CITRATE 50 UG/ML
INJECTION, SOLUTION INTRAMUSCULAR; INTRAVENOUS AS NEEDED
Status: DISCONTINUED | OUTPATIENT
Start: 2023-03-06 | End: 2023-03-06

## 2023-03-06 RX ORDER — HEPARIN SODIUM 1000 [USP'U]/ML
INJECTION, SOLUTION INTRAVENOUS; SUBCUTANEOUS CODE/TRAUMA/SEDATION MEDICATION
Status: DISCONTINUED | OUTPATIENT
Start: 2023-03-06 | End: 2023-03-06 | Stop reason: HOSPADM

## 2023-03-06 RX ORDER — HYDROCODONE BITARTRATE AND ACETAMINOPHEN 5; 325 MG/1; MG/1
1 TABLET ORAL EVERY 4 HOURS PRN
Status: DISCONTINUED | OUTPATIENT
Start: 2023-03-06 | End: 2023-03-08 | Stop reason: HOSPADM

## 2023-03-06 RX ORDER — LIDOCAINE HYDROCHLORIDE 10 MG/ML
INJECTION, SOLUTION EPIDURAL; INFILTRATION; INTRACAUDAL; PERINEURAL CODE/TRAUMA/SEDATION MEDICATION
Status: DISCONTINUED | OUTPATIENT
Start: 2023-03-06 | End: 2023-03-06 | Stop reason: HOSPADM

## 2023-03-06 RX ORDER — AMOXICILLIN 250 MG
2 CAPSULE ORAL
Status: DISCONTINUED | OUTPATIENT
Start: 2023-03-06 | End: 2023-03-08 | Stop reason: HOSPADM

## 2023-03-06 RX ORDER — PROPOFOL 10 MG/ML
INJECTION, EMULSION INTRAVENOUS CONTINUOUS PRN
Status: DISCONTINUED | OUTPATIENT
Start: 2023-03-06 | End: 2023-03-06

## 2023-03-06 RX ADMIN — GUAIFENESIN 600 MG: 600 TABLET, EXTENDED RELEASE ORAL at 08:37

## 2023-03-06 RX ADMIN — ENOXAPARIN SODIUM 40 MG: 40 INJECTION SUBCUTANEOUS at 08:37

## 2023-03-06 RX ADMIN — ACETAMINOPHEN 650 MG: 325 TABLET ORAL at 17:55

## 2023-03-06 RX ADMIN — MIDAZOLAM HYDROCHLORIDE 2 MG: 2 INJECTION, SOLUTION INTRAMUSCULAR; INTRAVENOUS at 12:15

## 2023-03-06 RX ADMIN — SENNOSIDES AND DOCUSATE SODIUM 2 TABLET: 8.6; 5 TABLET ORAL at 21:35

## 2023-03-06 RX ADMIN — ATORVASTATIN CALCIUM 40 MG: 40 TABLET, FILM COATED ORAL at 16:56

## 2023-03-06 RX ADMIN — INSULIN LISPRO 1 UNITS: 100 INJECTION, SOLUTION INTRAVENOUS; SUBCUTANEOUS at 21:33

## 2023-03-06 RX ADMIN — ASPIRIN 81 MG: 81 TABLET, COATED ORAL at 08:37

## 2023-03-06 RX ADMIN — PROPOFOL 8 MCG/KG/MIN: 10 INJECTION, EMULSION INTRAVENOUS at 12:15

## 2023-03-06 RX ADMIN — FENTANYL CITRATE 75 MCG: 50 INJECTION, SOLUTION INTRAMUSCULAR; INTRAVENOUS at 16:24

## 2023-03-06 RX ADMIN — FENTANYL CITRATE 25 MCG: 50 INJECTION, SOLUTION INTRAMUSCULAR; INTRAVENOUS at 16:15

## 2023-03-06 RX ADMIN — SODIUM CHLORIDE 250 ML/HR: 0.9 INJECTION, SOLUTION INTRAVENOUS at 10:03

## 2023-03-06 RX ADMIN — GUAIFENESIN 600 MG: 600 TABLET, EXTENDED RELEASE ORAL at 20:30

## 2023-03-06 RX ADMIN — SODIUM CHLORIDE 250 ML/HR: 0.9 INJECTION, SOLUTION INTRAVENOUS at 06:00

## 2023-03-06 RX ADMIN — OXYCODONE HYDROCHLORIDE 5 MG: 5 TABLET ORAL at 20:30

## 2023-03-06 RX ADMIN — SODIUM CHLORIDE 250 ML/HR: 0.9 INJECTION, SOLUTION INTRAVENOUS at 02:19

## 2023-03-06 RX ADMIN — ONDANSETRON HYDROCHLORIDE 4 MG: 2 INJECTION, SOLUTION INTRAMUSCULAR; INTRAVENOUS at 16:48

## 2023-03-06 RX ADMIN — SODIUM CHLORIDE: 0.9 INJECTION, SOLUTION INTRAVENOUS at 11:59

## 2023-03-06 NOTE — PROGRESS NOTES
Pt back in a sustained 's  for about 10 mins  TT message sent to on call Cardiology Fellow, Malik Tamez to make aware  Awaiting orders  Pt remains calm and comfortable  VSS

## 2023-03-06 NOTE — ANESTHESIA PREPROCEDURE EVALUATION
Procedure:  Cardiac eps/svt ablation (Chest)    Relevant Problems   CARDIO   (+) Dyspnea on exertion   (+) Hyperlipidemia   (+) Hypertension   (+) SVT (supraventricular tachycardia) (HCC)      PULMONARY   (+) Dyspnea on exertion   (+) ABBY (obstructive sleep apnea)   (+) Pneumonia      Endocrine   (+) Diabetes mellitus ([address] )      •  Left Ventricle: Left ventricular cavity size is normal  Wall thickness is normal  The left ventricular ejection fraction is 50%  Systolic function is low normal  Wall motion is normal with the exception of abnormal septal motion  Diastolic function is mildly abnormal, consistent with grade I (abnormal) relaxation  Left atrial filling pressure is normal   •  IVS: There is abnormal septal motion of unclear etiology  •  Left Atrium: The atrium is dilated  •  Mitral Valve: There is mild calcification of the mitral leaflets  The leaflets exhibit normal mobility  There is annular calcification  There is trace to mild regurgitation  There is no evidence of stenosis  •  Tricuspid Valve: There is mild regurgitation  There is no evidence of stenosis  The right ventricular systolic pressure is mildly elevated  The estimated right ventricular systolic pressure is 31 87 mmHg  •  Aorta: The aortic root is upper normal in size  The ascending aorta is normal in size  The aortic root is 3 60 cm  The ascending aorta is 3 2 cm  •  Prior TTE study available for comparison  Prior study date: 3/31/2015  Changes noted when compared to prior study  Changes include: RVSP on prior study was 26 mmHg compared to 36 mmHg on current study  Valve regurgitation is unchanged  EF 50-55%  Prior stress echocardiogram 2019 showed EF of 50%          Physical Exam    Airway       Dental       Cardiovascular      Pulmonary      Other Findings        Anesthesia Plan  ASA Score- 3     Anesthesia Type- IV sedation with anesthesia with ASA Monitors           Additional Monitors:   Airway Plan:           Plan Factors-Exercise tolerance (METS): >4 METS  Chart reviewed               Induction-     Postoperative Plan-     Informed Consent-

## 2023-03-06 NOTE — PROGRESS NOTES
[facility] [facility]  Progress Note - Bianka Hahn 1945, 68 y o  female MRN: [id number]  Unit/Bed#: [facility] 529-01 Encounter: [id number]  Primary Care Provider: Fady Valenzuela MD   Date and time admitted to hospital: 3/2/2023  4:53 PM    * SVT (supraventricular tachycardia) ([address] )  Assessment & Plan  · Recurrent episodes of SVT while hospitalized with pneumonia at [facility]  · Transferred to [facility] for evaluation by electrophysiology    · Status post SVT ablation 3/6  · Continue to monitor on telemetry    Pneumonia  Assessment & Plan  · Initially admitted to [facility] with pneumonia and SVT  · Completed 5-day course of IV ceftriaxone while inpatient   · Continue Mucinex      Sepsis (HCC)-resolved as of 3/5/2023  Assessment & Plan  · Criteria on admission with fever, tachycardia, and right lower lobe pneumonia diagnosed as an outpatient  · The treatment plan for pneumonia    Thyroid nodule  Assessment & Plan  · Incidental finding on CT scan 4 2 cm right thyroid nodule with mass effect on the trachea   Recommend follow-up with nonemergent thyroid ultrasound    Class 1 obesity due to excess calories with serious comorbidity and body mass index (BMI) of 32 0 to 32 9 in adult  Assessment & Plan  · Affects all aspects of her care  · Diet and lifestyle modifications recommended    ABBY (obstructive sleep apnea)  Assessment & Plan  · CPAP QHS    Dyspnea on exertion  Assessment & Plan  · Echocardiogram Systolic function is low normal  Wall motion is normal with the exception of abnormal septal motion  Diastolic function is mildly abnormal, consistent with grade I   · Patient had a cardiac cath in 2016 which was negative, he also had a stress test in 2016 and 2019, patient SVT history is since 2016     · CTA of the chest was negative for PE and pulmonary edema, pneumonia postive   · Likely due to pneumonia  · Improved with treatment; continue to monitor    VTE Pharmacologic Prophylaxis: VTE Score: 4 Moderate Risk (Score 3-4) - Pharmacological DVT Prophylaxis Ordered: enoxaparin (Lovenox)  Patient Centered Rounds: I performed bedside rounds with nursing staff today  Discussions with Specialists or Other Care Team Provider: COLUMBA     Education and Discussions with Family / Patient: Patient declined call to   Total Time Spent on Date of Encounter in care of patient: 35 minutes This time was spent on one or more of the following: performing physical exam; counseling and coordination of care; obtaining or reviewing history; documenting in the medical record; reviewing/ordering tests, medications or procedures; communicating with other healthcare professionals and discussing with patient's family/caregivers  Current Length of Stay: 4 day(s)  Current Patient Status: Inpatient   Certification Statement: The patient will continue to require additional inpatient hospital stay due to Cardiac monitoring  Discharge Plan: Anticipate discharge in 24-48 hrs to home  Code Status: Level 1 - Full Code    Subjective:   Patient seen and examined  Underwent SVT ablation today  No complications reported  She is feeling well at the time of my evaluation  Objective:     Vitals:   Temp (24hrs), Av °F (36 7 °C), Min:97 5 °F (36 4 °C), Max:98 2 °F (36 8 °C)    Temp:  [97 5 °F (36 4 °C)-98 2 °F (36 8 °C)] 98 2 °F (36 8 °C)  HR:  [] 122  Resp:  [15-17] 15  BP: (104-141)/(67-87) 141/87  SpO2:  [84 %-95 %] 84 %  Body mass index is 33 26 kg/m²  Input and Output Summary (last 24 hours): Intake/Output Summary (Last 24 hours) at 3/6/2023 1714  Last data filed at 3/6/2023 1701  Gross per 24 hour   Intake 5045 84 ml   Output 600 ml   Net 4445 84 ml       PHYSICAL EXAM:    Vitals signs reviewed  Constitutional   Awake and cooperative  NAD  Head/Neck   Normocephalic  Atraumatic  HEENT   No scleral icterus  EOMI  Heart   Regular rate and rhythm  No murmers  Lungs   Clear to auscultation bilaterally  Respirations unlaboured  Abdomen   Soft  Nontender  Nondistended  Skin   Skin color normal  No rashes  Extremities   No deformities  No peripheral edema  Neuro   Alert and oriented  No new deficits  Psych   Mood stable  Affect normal          Additional Data:     Labs:  Results from last 7 days   Lab Units 03/06/23  0600   WBC Thousand/uL 11 30*   HEMOGLOBIN g/dL 13 3   HEMATOCRIT % 40 8   PLATELETS Thousands/uL 476*   NEUTROS PCT % 61   LYMPHS PCT % 23   MONOS PCT % 10   EOS PCT % 4     Results from last 7 days   Lab Units 03/06/23  0600 03/02/23  0530 03/01/23  0513   SODIUM mmol/L 138   < > 136   POTASSIUM mmol/L 4 1   < > 4 0   CHLORIDE mmol/L 108   < > 101   CO2 mmol/L 27   < > 29   BUN mg/dL 15   < > 14   CREATININE mg/dL 0 53*   < > 0 48*   ANION GAP mmol/L 3*   < > 6   CALCIUM mg/dL 9 2   < > 8 8   ALBUMIN g/dL  --   --  3 8   TOTAL BILIRUBIN mg/dL  --   --  0 53   ALK PHOS U/L  --   --  57   ALT U/L  --   --  15   AST U/L  --   --  20   GLUCOSE RANDOM mg/dL 130   < > 136    < > = values in this interval not displayed       Results from last 7 days   Lab Units 02/28/23  1053   INR  1 02     Results from last 7 days   Lab Units 03/06/23  1649 03/06/23  7250 03/05/23  2105 03/05/23  1559 03/05/23  1039 03/05/23  6598 03/04/23  2052 03/04/23  1602 03/04/23  1043 03/04/23  0619 03/03/23  2158 03/03/23  1642   POC GLUCOSE mg/dl 141* 110 112 157* 138 140 137 123 155* 140 140 160*     Results from last 7 days   Lab Units 02/28/23  1809   HEMOGLOBIN A1C % 6 3*     Results from last 7 days   Lab Units 03/01/23  0513 02/28/23  1809 02/28/23  1053   LACTIC ACID mmol/L  --  0 9  --    PROCALCITONIN ng/ml 0 13  --  0 10       Lines/Drains:  Invasive Devices     Peripheral Intravenous Line  Duration           Peripheral IV 03/04/23 Left;Ventral (anterior) Forearm 1 day    Peripheral IV 03/05/23 Left;Proximal;Ventral (anterior) Forearm <1 day Telemetry:  Telemetry Orders (From admission, onward)             48 Hour Telemetry Monitoring  Continuous x 48 hours        References:    Telemetry Guidelines   Question:  Reason for 48 Hour Telemetry  Answer:  Arrhythmias Requiring Medical Therapy (eg  SVT, Vtach/fib, Bradycardia, Uncontrolled A-fib)                 Telemetry Reviewed: Sinus rhythm with runs of SVT  Indication for Continued Telemetry Use: Arrthymias requiring medical therapy             Imaging: No pertinent imaging reviewed  Recent Cultures (last 7 days):   Results from last 7 days   Lab Units 03/01/23  0754 02/28/23  2348 02/28/23  1810 02/28/23  1758   BLOOD CULTURE   --   --  No Growth After 5 Days  No Growth After 5 Days  SPUTUM CULTURE  3+ Growth of  Commensal respiratory [place] only; No significant growth of Staph aureus/MRSA or Pseudomonas aeruginosa    --   --   --    GRAM STAIN RESULT  1+ Epithelial Cells*  2+ Polys*  1+ Gram positive cocci in pairs*  1+ Gram variable rods*  --   --   --    LEGIONELLA URINARY ANTIGEN   --  Negative  --   --        Last 24 Hours Medication List:   Current Facility-Administered Medications   Medication Dose Route Frequency Provider Last Rate   • acetaminophen  650 mg Oral Q6H PRN Kody Davenport MD     • aspirin  81 mg Oral Daily Kody Davenport MD     • atorvastatin  40 mg Oral Daily With Carol Prieto MD     • benzonatate  100 mg Oral TID PRN Kody Davenport MD     • enoxaparin  40 mg Subcutaneous Daily Kody Davenport MD     • guaiFENesin  600 mg Oral Q12H [address] Kody Davenport MD     • insulin lispro  1-6 Units Subcutaneous TID AC Kody Davenport MD     • insulin lispro  1-6 Units Subcutaneous HS Kody Davenport MD     • levalbuterol  1 25 mg Nebulization Q4H PRN Kody Davenport MD     • metoprolol  5 mg Intravenous Q6H PRN Kody Davenport MD     • ondansetron  4 mg Intravenous Q6H PRN Kody Davenport MD     • senna-docusate sodium  1 tablet Oral HS Kody Davenport MD     • sodium chloride 250 mL/hr Intravenous Continuous Jim Quiñones MD Stopped (03/06/23 6503)        Today, Patient Was Seen By: Eladia Crook DO    **Please Note: This note may have been constructed using a voice recognition system  **

## 2023-03-06 NOTE — RESTORATIVE TECHNICIAN NOTE
Restorative Technician Note      Patient Name: Debo Marques     Note Type: Mobility  Patient Position Upon Consult: Supine  Activity Performed: Ambulated  Patient Position at End of Consult:  Other (comment) (Nataly Query)

## 2023-03-06 NOTE — ANESTHESIA POSTPROCEDURE EVALUATION
Post-Op Assessment Note    CV Status:  Stable  Pain Score: 0    Pain management: adequate  Multimodal analgesia used between 6 hours prior to anesthesia start to PACU discharge    Mental Status:  Alert   Hydration Status:  Stable   PONV Controlled:  None   Airway Patency:  Patent   Two or more mitigation strategies used for obstructive sleep apnea   Post Op Vitals Reviewed: Yes      Staff: CRNA         No notable events documented      BP   145/78   Temp   98   Pulse  90   Resp   14   SpO2   98

## 2023-03-06 NOTE — PROGRESS NOTES
Pt has been in sustained 's for about 10-15 mins  No improvement with Valsalva Maneuvers  Pt physically aware of the rhythm, but not uncomfortable, and it no distress  VSS  TT message sent to on call Cardiology Fellow, Sudha Bettencourt to make aware  Orders noted  IV Lopressor given and patient converted back to NSR after about 5 mins

## 2023-03-06 NOTE — PROGRESS NOTES
Pt sustained SVT in the 200's for roughly 35-40 mins  Remained stable  IV Cardizem given per order and patient converted back to NSR after about 5 mins  VSS  Cardiology fellow aware

## 2023-03-07 LAB
ANION GAP SERPL CALCULATED.3IONS-SCNC: 4 MMOL/L (ref 4–13)
ATRIAL RATE: 120 BPM
BUN SERPL-MCNC: 10 MG/DL (ref 5–25)
CALCIUM SERPL-MCNC: 8.6 MG/DL (ref 8.3–10.1)
CHLORIDE SERPL-SCNC: 108 MMOL/L (ref 96–108)
CO2 SERPL-SCNC: 27 MMOL/L (ref 21–32)
CREAT SERPL-MCNC: 0.37 MG/DL (ref 0.6–1.3)
ERYTHROCYTE [DISTWIDTH] IN BLOOD BY AUTOMATED COUNT: 11.7 % (ref 11.6–15.1)
GFR SERPL CREATININE-BSD FRML MDRD: 103 ML/MIN/1.73SQ M
GLUCOSE SERPL-MCNC: 106 MG/DL (ref 65–140)
GLUCOSE SERPL-MCNC: 116 MG/DL (ref 65–140)
GLUCOSE SERPL-MCNC: 144 MG/DL (ref 65–140)
GLUCOSE SERPL-MCNC: 148 MG/DL (ref 65–140)
GLUCOSE SERPL-MCNC: 149 MG/DL (ref 65–140)
HCT VFR BLD AUTO: 33.9 % (ref 34.8–46.1)
HGB BLD-MCNC: 10.9 G/DL (ref 11.5–15.4)
MCH RBC QN AUTO: 32.3 PG (ref 26.8–34.3)
MCHC RBC AUTO-ENTMCNC: 32.2 G/DL (ref 31.4–37.4)
MCV RBC AUTO: 101 FL (ref 82–98)
P AXIS: 6 DEGREES
PLATELET # BLD AUTO: 362 THOUSANDS/UL (ref 149–390)
PMV BLD AUTO: 8.7 FL (ref 8.9–12.7)
POTASSIUM SERPL-SCNC: 3.6 MMOL/L (ref 3.5–5.3)
PR INTERVAL: 148 MS
QRS AXIS: 10 DEGREES
QRSD INTERVAL: 74 MS
QT INTERVAL: 314 MS
QTC INTERVAL: 445 MS
RBC # BLD AUTO: 3.37 MILLION/UL (ref 3.81–5.12)
SODIUM SERPL-SCNC: 139 MMOL/L (ref 135–147)
T WAVE AXIS: 43 DEGREES
VENTRICULAR RATE: 121 BPM
WBC # BLD AUTO: 11.77 THOUSAND/UL (ref 4.31–10.16)

## 2023-03-07 RX ORDER — DILTIAZEM HYDROCHLORIDE 120 MG/1
120 CAPSULE, COATED, EXTENDED RELEASE ORAL DAILY
Status: DISCONTINUED | OUTPATIENT
Start: 2023-03-07 | End: 2023-03-08 | Stop reason: HOSPADM

## 2023-03-07 RX ORDER — FUROSEMIDE 10 MG/ML
10 INJECTION INTRAMUSCULAR; INTRAVENOUS ONCE
Status: COMPLETED | OUTPATIENT
Start: 2023-03-07 | End: 2023-03-07

## 2023-03-07 RX ADMIN — DILTIAZEM HYDROCHLORIDE 120 MG: 120 CAPSULE, COATED, EXTENDED RELEASE ORAL at 11:42

## 2023-03-07 RX ADMIN — ENOXAPARIN SODIUM 40 MG: 40 INJECTION SUBCUTANEOUS at 08:54

## 2023-03-07 RX ADMIN — FUROSEMIDE 10 MG: 10 INJECTION, SOLUTION INTRAMUSCULAR; INTRAVENOUS at 11:42

## 2023-03-07 RX ADMIN — ATORVASTATIN CALCIUM 40 MG: 40 TABLET, FILM COATED ORAL at 17:10

## 2023-03-07 RX ADMIN — BENZONATATE 100 MG: 100 CAPSULE ORAL at 00:31

## 2023-03-07 RX ADMIN — ASPIRIN 81 MG: 81 TABLET, COATED ORAL at 08:53

## 2023-03-07 RX ADMIN — GUAIFENESIN 600 MG: 600 TABLET, EXTENDED RELEASE ORAL at 08:53

## 2023-03-07 RX ADMIN — GUAIFENESIN 600 MG: 600 TABLET, EXTENDED RELEASE ORAL at 20:56

## 2023-03-07 RX ADMIN — HYDROCODONE BITARTRATE AND ACETAMINOPHEN 1 TABLET: 5; 325 TABLET ORAL at 00:31

## 2023-03-07 NOTE — RESTORATIVE TECHNICIAN NOTE
Restorative Technician Note      Patient Name: Debo Marques     Note Type: Mobility  Activity Performed: Ambulated  Patient Position at End of Consult: All needs within reach;  Bedside chair

## 2023-03-07 NOTE — PROGRESS NOTES
Progress Note - Electrophysiology-Cardiology (EP)   Silvia Sample 68 y o  female MRN: [id number]  Unit/Bed#: [facility] 529-01 Encounter: [id number]      Assessment:  1   Recurrent SVT, status post 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) 3/6/2023              A ) multiple episodes noted during admission - responded to vagal maneuvers (blowing into incentive spirometer) and IV lopressor              B ) previously maintained on lopressor 50 mg BID, will be changed to diltiazem 120 mg moving forward  2   Low normal LV systolic function with EF 50% per echo this admission  3   No obstructive CAD per cardiac catheterization in 2016  4   Hypertension  5   Dyslipidemia  6   Diabetes mellitus type 2  7   Obesity with BMI 32  8   Obstructive sleep apnea, maintained on CPAP  9   Pneumonia with sepsis, treated with ceftriaxone      Plan:  Telemetry shows sinus tachycardia with rates often in the 100s  While she was previously on metoprolol, at this time recommend Cardizem CD1 120 mg daily to help with rate control  Bilateral groin sutures were removed without incident, no evidence of ongoing bleeding or hematoma  She reports hand swelling and fluid retention in the post ablation setting  She is naïve to diuretics, will give Lasix 10 mg IV x 1 dose and assess response  Blood work shows mild drop in hemoglobin, which is typically noted in the post ablation setting  Given the above findings, recommend that she remain in the hospital another 24 hours to reassess her volume status, her overall heart rate trends, and to recheck blood work in the morning  She understands and agrees with this plan  Will reevaluate tomorrow morning for potential discharge  Subjective/Objective   Chief Complaint: No acute complaints    Subjective: Patient feels well overall    She can at times feel her elevated heart rate, but denies chest pain or pressure, shortness of breath, dizziness, or lightheadedness  She notes that her hands and fingers are swollen following the procedure  She denies significant groin pain        Objective:     Vitals: /78   Pulse (!) 119   Temp 98 8 °F (37 1 °C) (Oral)   Resp 17   Ht 5' 4" (1 626 m)   Wt 89 8 kg (197 lb 15 6 oz)   SpO2 91%   BMI 33 98 kg/m²   Vitals:    03/06/23 0555 03/07/23 0526   Weight: 87 9 kg (193 lb 12 6 oz) 89 8 kg (197 lb 15 6 oz)     Orthostatic Blood Pressures    Flowsheet Row Most Recent Value   Blood Pressure 127/78 filed at 03/07/2023 5305   Patient Position - Orthostatic VS Lying filed at 03/06/2023 0241            Intake/Output Summary (Last 24 hours) at 3/7/2023 0901  Last data filed at 3/7/2023 0604  Gross per 24 hour   Intake 4034 17 ml   Output 1850 ml   Net 2184 17 ml       Invasive Devices     Peripheral Intravenous Line  Duration           Peripheral IV 03/04/23 Left;Ventral (anterior) Forearm 2 days    Peripheral IV 03/05/23 Left;Proximal;Ventral (anterior) Forearm 1 day                            Scheduled Meds:  Current Facility-Administered Medications   Medication Dose Route Frequency Provider Last Rate   • acetaminophen  650 mg Oral Q6H PRN Deepali Cardenas MD     • aspirin  81 mg Oral Daily Deepali Cardenas MD     • atorvastatin  40 mg Oral Daily With Ed Pate MD     • benzonatate  100 mg Oral TID PRN Deepali Cardenas MD     • enoxaparin  40 mg Subcutaneous Daily Deepali Cardenas MD     • guaiFENesin  600 mg Oral Q12H [facility] Deepali Cardenas MD     • HYDROcodone-acetaminophen  1 tablet Oral Q4H PRN Chelsea Husain PA-C     • insulin lispro  1-6 Units Subcutaneous TID AC Deepali Cardenas MD     • insulin lispro  1-6 Units Subcutaneous HS Deepali Cardenas MD     • levalbuterol  1 25 mg Nebulization Q4H PRN Deepali Cardenas MD     • metoprolol  5 mg Intravenous Q6H PRN Deepali Cardenas MD     • ondansetron  4 mg Intravenous Q6H PRN Deepali Cardenas MD     • oxyCODONE  5 mg Oral Q4H PRN Chelsea Husain PA-C     • polyethylene glycol  17 g Oral Daily PRN Olaf Miranda PA-C     • senna-docusate sodium  2 tablet Oral HS Olaf Miranda PA-C     • sodium chloride  250 mL/hr Intravenous Continuous Barbie Holland MD Stopped (03/06/23 1701)     Continuous Infusions:sodium chloride, 250 mL/hr, Last Rate: Stopped (03/06/23 1701)      PRN Meds: •  acetaminophen  •  benzonatate  •  HYDROcodone-acetaminophen  •  levalbuterol  •  metoprolol  •  ondansetron  •  oxyCODONE  •  polyethylene glycol    Review of Systems   Constitutional: Negative for fever and malaise/fatigue  Cardiovascular: Positive for palpitations  Negative for chest pain, dyspnea on exertion, irregular heartbeat, leg swelling, near-syncope, orthopnea, paroxysmal nocturnal dyspnea and syncope  All other systems reviewed and are negative  Physical Exam:   GEN: NAD, alert and oriented x 3, well appearing  SKIN: dry without significant lesions or rashes  HEENT: NCAT, PERRL, EOMs intact  NECK: No JVD appreciated  CARDIOVASCULAR: RRR, normal S1, S2 without murmurs, rubs, or gallops appreciated  LUNGS: Clear to auscultation bilaterally without wheezes, rhonchi, or rales  ABDOMEN: Soft, nontender, nondistended  EXTREMITIES/VASCULAR: perfused without clubbing, cyanosis, or LE edema b/l  PSYCH: Normal mood and affect  NEURO: CN ll-Xll grossly intact                Lab Results: I have personally reviewed pertinent lab results      Results from last 7 days   Lab Units 03/07/23 0223 03/06/23  0600 03/03/23  0543   WBC Thousand/uL 11 77* 11 30* 11 40*   HEMOGLOBIN g/dL 10 9* 13 3 13 0   HEMATOCRIT % 33 9* 40 8 38 9   PLATELETS Thousands/uL 362 476* 424*     Results from last 7 days   Lab Units 03/07/23 0223 03/06/23  0600 03/04/23  0451   POTASSIUM mmol/L 3 6 4 1 4 4   CHLORIDE mmol/L 108 108 101   CO2 mmol/L 27 27 29   BUN mg/dL 10 15 13   CREATININE mg/dL 0 37* 0 53* 0 48*   CALCIUM mg/dL 8 6 9 2 10 0     Results from last 7 days   Lab Units 02/28/23  1053   INR  1 02   PTT seconds 29     Results from last 7 days   Lab Units 03/06/23  0600 03/04/23  0451 03/03/23  0543   MAGNESIUM mg/dL 2 4 2 5 2 4         Imaging: I have personally reviewed pertinent reports  No results found for this or any previous visit        EKG/TELEMETRY: Normal sinus rhythm and sinus tachycardia, heart rates often over 100 bpm, no recurrent SVT noted      VTE Pharmacologic Prophylaxis: Enoxaparin (Lovenox)

## 2023-03-07 NOTE — CASE MANAGEMENT
Case Management Discharge Planning Note    Patient name Tanja Thurston  Location [facility] 529/[facility] 983-42 MRN [id number]  : 1945 Date 3/7/2023       Current Admission Date: 3/2/2023  Current Admission Diagnosis:SVT (supraventricular tachycardia) [facility])   Patient Active Problem List    Diagnosis Date Noted   • Hypertension    • Diabetes mellitus ([facility] )    • Hyperlipidemia    • Thyroid nodule 2023   • SVT (supraventricular tachycardia) ([facility] ) 2023   • Pneumonia 2023   • Dyspnea on exertion 2023   • ABBY (obstructive sleep apnea) 2023   • Class 1 obesity due to excess calories with serious comorbidity and body mass index (BMI) of 32 0 to 32 9 in adult 2023      LOS (days): 5  Geometric Mean LOS (GMLOS) (days): 3 40  Days to GMLOS:-1 4     OBJECTIVE:  Risk of Unplanned Readmission Score: 12 6         Current admission status: Inpatient   Preferred Pharmacy:   [address] [place] [address] #[id number]  [facility]/ Vinayak Gonsalves [address] [place] [id number]  Phone: [phone] Fax: [phone]    Primary Care Provider: Sury Badillo MD    Primary Insurance: [address] [place] [address]  Secondary Insurance:     DISCHARGE DETAILS:        IMM Given (Date):: 23  IMM Given to[de-identified] Patient

## 2023-03-07 NOTE — PROGRESS NOTES
[address] [facility]  Progress Note - Debo Marques 1945, 68 y o  female MRN: [id number]  Unit/Bed#: [facility] 529-01 Encounter: [id number]  Primary Care Provider: Lindsay Foreman MD   Date and time admitted to hospital: 3/2/2023  4:53 PM    Thyroid nodule  Assessment & Plan  · Incidental finding on CT scan 4 2 cm right thyroid nodule with mass effect on the trachea   Recommend follow-up with nonemergent thyroid ultrasound    Class 1 obesity due to excess calories with serious comorbidity and body mass index (BMI) of 32 0 to 32 9 in adult  Assessment & Plan  · Affects all aspects of her care  · Diet and lifestyle modifications recommended    ABBY (obstructive sleep apnea)  Assessment & Plan  · CPAP QHS    Dyspnea on exertion  Assessment & Plan  · Echocardiogram Systolic function is low normal  Wall motion is normal with the exception of abnormal septal motion  Diastolic function is mildly abnormal, consistent with grade I   · Patient had a cardiac cath in 2016 which was negative, he also had a stress test in 2016 and 2019, patient SVT history is since 2016     · CTA of the chest was negative for PE and pulmonary edema, pneumonia postive   · Likely due to pneumonia  · Improved with treatment; continue to monitor    Pneumonia  Assessment & Plan  · Initially admitted to [facility] with pneumonia and SVT  · Completed 5-day course of IV ceftriaxone while inpatient   · Continue Mucinex      * SVT (supraventricular tachycardia) ([address] )  Assessment & Plan  · Recurrent episodes of SVT while hospitalized with pneumonia at [facility]  · Transferred to [facility] for evaluation by electrophysiology  · Status post SVT ablation 3/6  · Cardiology adding Cardizem, continue to monitor heart rate and also monitor anemia overnight  · Continue to monitor on telemetry    Sepsis (HCC)-resolved as of 3/5/2023  Assessment & Plan  · Criteria on admission with fever, tachycardia, and right lower lobe pneumonia diagnosed as an outpatient  · The treatment plan for pneumonia      VTE Pharmacologic Prophylaxis: VTE Score: 4 Moderate Risk (Score 3-4) - Pharmacological DVT Prophylaxis Ordered: enoxaparin (Lovenox)  Patient Centered Rounds: I performed bedside rounds with nursing staff today  Discussions with Specialists or Other Care Team Provider: SANTANA    Education and Discussions with Family / Patient: Patient declined call to   Total Time Spent on Date of Encounter in care of patient: 45 minutes This time was spent on one or more of the following: performing physical exam; counseling and coordination of care; obtaining or reviewing history; documenting in the medical record; reviewing/ordering tests, medications or procedures; communicating with other healthcare professionals and discussing with patient's family/caregivers  Current Length of Stay: 5 day(s)  Current Patient Status: Inpatient   Certification Statement: The patient will continue to require additional inpatient hospital stay due to HR monitoring  Discharge Plan: Anticipate discharge tomorrow to home  Code Status: Level 1 - Full Code    Subjective:   Seen and examined at bedside  Patient resting comfortably in bed  Denies chest pain or palpitations  Telemetry showing some heart rates around 110-120 overnight    Objective:     Vitals:   Temp (24hrs), Av 6 °F (37 °C), Min:98 3 °F (36 8 °C), Max:98 8 °F (37 1 °C)    Temp:  [98 3 °F (36 8 °C)-98 8 °F (37 1 °C)] 98 4 °F (36 9 °C)  HR:  [] 95  Resp:  [17-20] 17  BP: (112-142)/(62-78) 116/76  SpO2:  [90 %-94 %] 92 %  Body mass index is 33 98 kg/m²  Input and Output Summary (last 24 hours): Intake/Output Summary (Last 24 hours) at 3/7/2023 1732  Last data filed at 3/7/2023 1510  Gross per 24 hour   Intake 1076 ml   Output 3100 ml   Net - ml       Physical Exam:   Physical Exam  Vitals reviewed     Constitutional:       General: She is not in acute distress  Appearance: She is not ill-appearing  HENT:      Head: Normocephalic  Mouth/Throat:      Mouth: Mucous membranes are moist    Eyes:      General: No scleral icterus  Extraocular Movements: Extraocular movements intact  Cardiovascular:      Rate and Rhythm: Normal rate and regular rhythm  Pulses: Normal pulses  Heart sounds: No murmur heard  No friction rub  No gallop  Pulmonary:      Effort: Pulmonary effort is normal  No respiratory distress  Breath sounds: No wheezing, rhonchi or rales  Abdominal:      General: Abdomen is flat  Bowel sounds are normal  There is no distension  Palpations: Abdomen is soft  Tenderness: There is no abdominal tenderness  There is no guarding or rebound  Musculoskeletal:      Right lower leg: No edema  Left lower leg: No edema  Skin:     General: Skin is warm  Capillary Refill: Capillary refill takes less than 2 seconds  Coloration: Skin is not jaundiced  Findings: No rash  Neurological:      General: No focal deficit present  Mental Status: She is alert and oriented to person, place, and time  Sensory: No sensory deficit  Motor: No weakness     Psychiatric:         Mood and Affect: Mood normal          Behavior: Behavior normal           Additional Data:     Labs:  Results from last 7 days   Lab Units 03/07/23 0223 03/06/23  0600   WBC Thousand/uL 11 77* 11 30*   HEMOGLOBIN g/dL 10 9* 13 3   HEMATOCRIT % 33 9* 40 8   PLATELETS Thousands/uL 362 476*   NEUTROS PCT %  --  61   LYMPHS PCT %  --  23   MONOS PCT %  --  10   EOS PCT %  --  4     Results from last 7 days   Lab Units 03/07/23  0223 03/02/23  0530 03/01/23  0513   SODIUM mmol/L 139   < > 136   POTASSIUM mmol/L 3 6   < > 4 0   CHLORIDE mmol/L 108   < > 101   CO2 mmol/L 27   < > 29   BUN mg/dL 10   < > 14   CREATININE mg/dL 0 37*   < > 0 48*   ANION GAP mmol/L 4   < > 6   CALCIUM mg/dL 8 6   < > 8 8   ALBUMIN g/dL  --   -- 3  8   TOTAL BILIRUBIN mg/dL  --   --  0 53   ALK PHOS U/L  --   --  57   ALT U/L  --   --  15   AST U/L  --   --  20   GLUCOSE RANDOM mg/dL 116   < > 136    < > = values in this interval not displayed  Results from last 7 days   Lab Units 03/07/23  1608 03/07/23  1044 03/07/23  8302 03/06/23  2047 03/06/23  1649 03/06/23  1527 03/05/23  2105 03/05/23  1559 03/05/23  1039 03/05/23  0628 03/04/23  2052 03/04/23  1602   POC GLUCOSE mg/dl 149* 148* 106 154* 141* 110 112 157* 138 140 137 123     Results from last 7 days   Lab Units 02/28/23  1809   HEMOGLOBIN A1C % 6 3*     Results from last 7 days   Lab Units 03/01/23  0513 02/28/23  1809   LACTIC ACID mmol/L  --  0 9   PROCALCITONIN ng/ml 0 13  --        Lines/Drains:  Invasive Devices     Peripheral Intravenous Line  Duration           Peripheral IV 03/04/23 Left;Ventral (anterior) Forearm 2 days    Peripheral IV 03/05/23 Left;Proximal;Ventral (anterior) Forearm 1 day                  Telemetry:  Telemetry Orders (From admission, onward)             48 Hour Telemetry Monitoring  Continuous x 48 hours        References:    Telemetry Guidelines   Question:  Reason for 48 Hour Telemetry  Answer:  Arrhythmias Requiring Medical Therapy (eg  SVT, Vtach/fib, Bradycardia, Uncontrolled A-fib)                 Telemetry Reviewed: Sinus Tachycardia  Indication for Continued Telemetry Use: Arrthymias requiring medical therapy             Imaging: No pertinent imaging reviewed  Recent Cultures (last 7 days):   Results from last 7 days   Lab Units 03/01/23  0754 02/28/23  2348 02/28/23  1810 02/28/23  1758   BLOOD CULTURE   --   --  No Growth After 5 Days  No Growth After 5 Days  SPUTUM CULTURE  3+ Growth of  Commensal respiratory [place] only; No significant growth of Staph aureus/MRSA or Pseudomonas aeruginosa    --   --   --    GRAM STAIN RESULT  1+ Epithelial Cells*  2+ Polys*  1+ Gram positive cocci in pairs*  1+ Gram variable rods*  --   --   --    LEGIONELLA URINARY ANTIGEN   --  Negative  --   --        Last 24 Hours Medication List:   Current Facility-Administered Medications   Medication Dose Route Frequency Provider Last Rate   • acetaminophen  650 mg Oral Q6H PRN Janneth Fam MD     • aspirin  81 mg Oral Daily Janneth Fam MD     • atorvastatin  40 mg Oral Daily With Milton Laura MD     • benzonatate  100 mg Oral TID PRN Janneth Fam MD     • diltiazem  120 mg Oral Daily Salvatore Emmanuel PA-C     • enoxaparin  40 mg Subcutaneous Daily Janneth Fam MD     • guaiFENesin  600 mg Oral Q12H [address] Janneth Fam MD     • HYDROcodone-acetaminophen  1 tablet Oral Q4H PRN Lillian Sy PA-C     • insulin lispro  1-6 Units Subcutaneous TID AC Janneth Fam MD     • insulin lispro  1-6 Units Subcutaneous HS Janneth Fam MD     • levalbuterol  1 25 mg Nebulization Q4H PRN Janneth Fam MD     • metoprolol  5 mg Intravenous Q6H PRN Janneth Fam MD     • ondansetron  4 mg Intravenous Q6H PRN Janneth Fam MD     • oxyCODONE  5 mg Oral Q4H PRN Lillian Sy PA-C     • polyethylene glycol  17 g Oral Daily PRN Lillian Sy PA-C     • senna-docusate sodium  2 tablet Oral HS Lillian Sy PA-C          Today, Patient Was Seen By: Cordell Armendariz    **Please Note: This note may have been constructed using a voice recognition system  **

## 2023-03-07 NOTE — RESPIRATORY THERAPY NOTE
Resp care   03/06/23 1932   Respiratory Assessment   Resp Comments pt has been refusing cpap since 3/2  will d/c order and pull machine from room

## 2023-03-08 ENCOUNTER — TELEPHONE (OUTPATIENT)
Dept: CARDIOLOGY CLINIC | Facility: CLINIC | Age: 78
End: 2023-03-08

## 2023-03-08 VITALS
SYSTOLIC BLOOD PRESSURE: 131 MMHG | DIASTOLIC BLOOD PRESSURE: 76 MMHG | BODY MASS INDEX: 33.27 KG/M2 | HEART RATE: 95 BPM | HEIGHT: 64 IN | OXYGEN SATURATION: 94 % | TEMPERATURE: 98.1 F | RESPIRATION RATE: 18 BRPM | WEIGHT: 194.89 LBS

## 2023-03-08 LAB
ANION GAP SERPL CALCULATED.3IONS-SCNC: 1 MMOL/L (ref 4–13)
ATRIAL RATE: 104 BPM
ATRIAL RATE: 167 BPM
ATRIAL RATE: 220 BPM
BASOPHILS # BLD AUTO: 0.08 THOUSANDS/ÂΜL (ref 0–0.1)
BASOPHILS NFR BLD AUTO: 1 % (ref 0–1)
BUN SERPL-MCNC: 9 MG/DL (ref 5–25)
CALCIUM SERPL-MCNC: 9.3 MG/DL (ref 8.3–10.1)
CHLORIDE SERPL-SCNC: 107 MMOL/L (ref 96–108)
CO2 SERPL-SCNC: 32 MMOL/L (ref 21–32)
CREAT SERPL-MCNC: 0.4 MG/DL (ref 0.6–1.3)
EOSINOPHIL # BLD AUTO: 0.32 THOUSAND/ÂΜL (ref 0–0.61)
EOSINOPHIL NFR BLD AUTO: 4 % (ref 0–6)
ERYTHROCYTE [DISTWIDTH] IN BLOOD BY AUTOMATED COUNT: 11.9 % (ref 11.6–15.1)
GFR SERPL CREATININE-BSD FRML MDRD: 100 ML/MIN/1.73SQ M
GLUCOSE SERPL-MCNC: 139 MG/DL (ref 65–140)
GLUCOSE SERPL-MCNC: 141 MG/DL (ref 65–140)
GLUCOSE SERPL-MCNC: 147 MG/DL (ref 65–140)
HCT VFR BLD AUTO: 34.5 % (ref 34.8–46.1)
HGB BLD-MCNC: 11.5 G/DL (ref 11.5–15.4)
IMM GRANULOCYTES # BLD AUTO: 0.07 THOUSAND/UL (ref 0–0.2)
IMM GRANULOCYTES NFR BLD AUTO: 1 % (ref 0–2)
LYMPHOCYTES # BLD AUTO: 1.49 THOUSANDS/ÂΜL (ref 0.6–4.47)
LYMPHOCYTES NFR BLD AUTO: 17 % (ref 14–44)
MCH RBC QN AUTO: 33 PG (ref 26.8–34.3)
MCHC RBC AUTO-ENTMCNC: 33.3 G/DL (ref 31.4–37.4)
MCV RBC AUTO: 99 FL (ref 82–98)
MONOCYTES # BLD AUTO: 1.05 THOUSAND/ÂΜL (ref 0.17–1.22)
MONOCYTES NFR BLD AUTO: 12 % (ref 4–12)
NEUTROPHILS # BLD AUTO: 5.55 THOUSANDS/ÂΜL (ref 1.85–7.62)
NEUTS SEG NFR BLD AUTO: 65 % (ref 43–75)
NRBC BLD AUTO-RTO: 0 /100 WBCS
P AXIS: 59 DEGREES
PLATELET # BLD AUTO: 373 THOUSANDS/UL (ref 149–390)
PMV BLD AUTO: 9.1 FL (ref 8.9–12.7)
POTASSIUM SERPL-SCNC: 3.4 MMOL/L (ref 3.5–5.3)
PR INTERVAL: 112 MS
PR INTERVAL: 146 MS
QRS AXIS: 12 DEGREES
QRS AXIS: 16 DEGREES
QRS AXIS: 21 DEGREES
QRSD INTERVAL: 84 MS
QRSD INTERVAL: 86 MS
QRSD INTERVAL: 88 MS
QT INTERVAL: 212 MS
QT INTERVAL: 280 MS
QT INTERVAL: 334 MS
QTC INTERVAL: 402 MS
QTC INTERVAL: 439 MS
QTC INTERVAL: 467 MS
RBC # BLD AUTO: 3.48 MILLION/UL (ref 3.81–5.12)
SODIUM SERPL-SCNC: 140 MMOL/L (ref 135–147)
T WAVE AXIS: -70 DEGREES
T WAVE AXIS: 229 DEGREES
T WAVE AXIS: 31 DEGREES
VENTRICULAR RATE: 104 BPM
VENTRICULAR RATE: 167 BPM
VENTRICULAR RATE: 216 BPM
WBC # BLD AUTO: 8.56 THOUSAND/UL (ref 4.31–10.16)

## 2023-03-08 RX ORDER — POTASSIUM CHLORIDE 20 MEQ/1
40 TABLET, EXTENDED RELEASE ORAL
Status: COMPLETED | OUTPATIENT
Start: 2023-03-08 | End: 2023-03-08

## 2023-03-08 RX ORDER — DILTIAZEM HYDROCHLORIDE 240 MG/1
240 CAPSULE, COATED, EXTENDED RELEASE ORAL DAILY
Qty: 30 CAPSULE | Refills: 3 | Status: SHIPPED | OUTPATIENT
Start: 2023-03-09

## 2023-03-08 RX ORDER — LOSARTAN POTASSIUM 25 MG/1
25 TABLET ORAL DAILY
Refills: 0
Start: 2023-03-08

## 2023-03-08 RX ORDER — FUROSEMIDE 10 MG/ML
10 INJECTION INTRAMUSCULAR; INTRAVENOUS ONCE
Status: COMPLETED | OUTPATIENT
Start: 2023-03-08 | End: 2023-03-08

## 2023-03-08 RX ADMIN — FUROSEMIDE 10 MG: 10 INJECTION, SOLUTION INTRAMUSCULAR; INTRAVENOUS at 10:26

## 2023-03-08 RX ADMIN — ENOXAPARIN SODIUM 40 MG: 40 INJECTION SUBCUTANEOUS at 09:03

## 2023-03-08 RX ADMIN — POTASSIUM CHLORIDE 40 MEQ: 1500 TABLET, EXTENDED RELEASE ORAL at 12:26

## 2023-03-08 RX ADMIN — DILTIAZEM HYDROCHLORIDE 120 MG: 120 CAPSULE, COATED, EXTENDED RELEASE ORAL at 09:03

## 2023-03-08 RX ADMIN — ASPIRIN 81 MG: 81 TABLET, COATED ORAL at 09:03

## 2023-03-08 RX ADMIN — GUAIFENESIN 600 MG: 600 TABLET, EXTENDED RELEASE ORAL at 09:03

## 2023-03-08 RX ADMIN — POTASSIUM CHLORIDE 40 MEQ: 1500 TABLET, EXTENDED RELEASE ORAL at 10:26

## 2023-03-08 NOTE — PROGRESS NOTES
Progress Note - Electrophysiology-Cardiology (EP)   Arnetha Mcardle 68 y o  female MRN: [id number]  Unit/Bed#: [facility] 529-01 Encounter: [id number]      Assessment:  1   Recurrent SVT, status post 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) 3/6/2023              A ) multiple episodes noted during admission - responded to vagal maneuvers (blowing into incentive spirometer) and IV lopressor              B ) previously maintained on lopressor 50 mg BID, changed to diltiazem 120 mg in the post ablation setting   C )  Brief runs of PAT noted in the post ablation setting, will increase her dose of diltiazem  2   Low normal LV systolic function with EF 50% per echo this admission  3   No obstructive CAD per cardiac catheterization in 2016  4   Hypertension  5   Dyslipidemia  6   Diabetes mellitus type 2  7   Obesity with BMI 32  8   Obstructive sleep apnea, maintained on CPAP  9   Pneumonia with sepsis, treated with ceftriaxone      Plan:  She still having brief runs of paroxysmal atrial tachycardia on telemetry  She has blood pressure room, thus will increase Cardizem CD to 240 mg daily  We will also arrange a nonlive 2-week event monitor upon discharge, our office will help arrange  Ideally, this can be placed at a follow-up visit with Dr Joseph Zarate scheduled for this Friday, 3/10/2023 (this was a previously scheduled appointment to establish care)  She still reports feeling slightly volume overloaded  She responded well to a dose of IV Lasix yesterday, will give 1 more dose of IV Lasix 10 mg x 1 prior to discharge  We will also replete potassium  Otherwise, she is stable for discharge from EP standpoint  All questions were answered  Please contact us with questions or concerns  Subjective/Objective   Chief Complaint: Occasional palpitations    Subjective: She notices her heart racing occasionally, more so with exertion    Otherwise, she denies recurrent SVT, chest pain, dizziness, or lightheadedness  She still reports feeling "tight", with swelling in her legs and hands  She reports responding well to IV Lasix yesterday but thinks she may need another dose        Objective:     Vitals: /75   Pulse (!) 106   Temp 98 6 °F (37 °C)   Resp 18   Ht 5' 4" (1 626 m)   Wt 88 4 kg (194 lb 14 2 oz)   SpO2 94%   BMI 33 45 kg/m²   Vitals:    03/07/23 0526 03/08/23 0600   Weight: 89 8 kg (197 lb 15 6 oz) 88 4 kg (194 lb 14 2 oz)     Orthostatic Blood Pressures    Flowsheet Row Most Recent Value   Blood Pressure 133/75 filed at 03/08/2023 6641   Patient Position - Orthostatic VS Sitting filed at 03/07/2023 1527            Intake/Output Summary (Last 24 hours) at 3/8/2023 1000  Last data filed at 3/7/2023 1510  Gross per 24 hour   Intake 596 ml   Output 1250 ml   Net -654 ml       Invasive Devices     Peripheral Intravenous Line  Duration           Peripheral IV 03/04/23 Left;Ventral (anterior) Forearm 3 days    Peripheral IV 03/05/23 Left;Proximal;Ventral (anterior) Forearm 2 days                            Scheduled Meds:  Current Facility-Administered Medications   Medication Dose Route Frequency Provider Last Rate   • acetaminophen  650 mg Oral Q6H PRN Francie Magallanes MD     • aspirin  81 mg Oral Daily Francie Magallanes MD     • atorvastatin  40 mg Oral Daily With Stephany Ibanez MD     • benzonatate  100 mg Oral TID PRN Francie Magallanes MD     • diltiazem  120 mg Oral Daily Nam Lewis PA-C     • enoxaparin  40 mg Subcutaneous Daily Francie Magallanes MD     • guaiFENesin  600 mg Oral Q12H Regino Julian MD     • HYDROcodone-acetaminophen  1 tablet Oral Q4H PRN Bridgett Cherry PA-C     • insulin lispro  1-6 Units Subcutaneous TID AC Francie Magallanes MD     • insulin lispro  1-6 Units Subcutaneous HS Francie Magallanes MD     • levalbuterol  1 25 mg Nebulization Q4H PRN Francie Magallanes MD     • metoprolol  5 mg Intravenous Q6H PRN Francie Magallanes MD     • ondansetron  4 mg Intravenous Q6H PRN Rosa Johnson MD     • oxyCODONE  5 mg Oral Q4H PRN Thom Howard PA-C     • polyethylene glycol  17 g Oral Daily PRN Thom Howard PA-C     • senna-docusate sodium  2 tablet Oral HS Thom Howard PA-C       Continuous Infusions:   PRN Meds: •  acetaminophen  •  benzonatate  •  HYDROcodone-acetaminophen  •  levalbuterol  •  metoprolol  •  ondansetron  •  oxyCODONE  •  polyethylene glycol    Review of Systems   Constitutional: Negative for fever and malaise/fatigue  Cardiovascular: Positive for irregular heartbeat, leg swelling and palpitations  Negative for chest pain, dyspnea on exertion, near-syncope, orthopnea, paroxysmal nocturnal dyspnea and syncope  All other systems reviewed and are negative  Physical Exam:   GEN: NAD, alert and oriented x 3, well appearing  SKIN: dry without significant lesions or rashes  HEENT: NCAT, PERRL, EOMs intact  NECK: No JVD appreciated  CARDIOVASCULAR: RRR, normal S1, S2 without murmurs, rubs, or gallops appreciated  LUNGS: Clear to auscultation bilaterally without wheezes, rhonchi, or rales  ABDOMEN: Soft, nontender, nondistended  EXTREMITIES/VASCULAR: perfused without clubbing, cyanosis, or LE  edema b/l  PSYCH: Normal mood and affect  NEURO: CN ll-Xll grossly intact                Lab Results: I have personally reviewed pertinent lab results      Results from last 7 days   Lab Units 03/08/23  0550 03/07/23  0223 03/06/23  0600   WBC Thousand/uL 8 56 11 77* 11 30*   HEMOGLOBIN g/dL 11 5 10 9* 13 3   HEMATOCRIT % 34 5* 33 9* 40 8   PLATELETS Thousands/uL 373 362 476*     Results from last 7 days   Lab Units 03/08/23  0550 03/07/23  0223 03/06/23  0600   POTASSIUM mmol/L 3 4* 3 6 4 1   CHLORIDE mmol/L 107 108 108   CO2 mmol/L 32 27 27   BUN mg/dL 9 10 15   CREATININE mg/dL 0 40* 0 37* 0 53*   CALCIUM mg/dL 9 3 8 6 9 2         Results from last 7 days   Lab Units 03/06/23  0600 03/04/23  0451 03/03/23  0543   MAGNESIUM mg/dL 2 4 2 5 2 4 Imaging: I have personally reviewed pertinent reports  No results found for this or any previous visit        EKG/TELEMETRY:             VTE Pharmacologic Prophylaxis: Enoxaparin (Lovenox)  VTE Mechanical Prophylaxis: sequential compression device

## 2023-03-08 NOTE — NURSING NOTE
Pt cleared for discharge home at this time  D/C instructions reviewed with and given to pt  Scripts sent to preferred pharmacy  All questions and concerns addressed at this time  IV site x2, Masimo and telemetry removed  Belongings gathered  Pt assisted to dress  Pt escorted home with family

## 2023-03-08 NOTE — PLAN OF CARE
Problem: PAIN - ADULT  Goal: Verbalizes/displays adequate comfort level or baseline comfort level  Description: Interventions:  - Encourage patient to monitor pain and request assistance  - Assess pain using appropriate pain scale  - Administer analgesics based on type and severity of pain and evaluate response  - Implement non-pharmacological measures as appropriate and evaluate response  - Consider cultural and social influences on pain and pain management  - Notify physician/advanced practitioner if interventions unsuccessful or patient reports new pain  Outcome: Progressing     Problem: INFECTION - ADULT  Goal: Absence or prevention of progression during hospitalization  Description: INTERVENTIONS:  - Assess and monitor for signs and symptoms of infection  - Monitor lab/diagnostic results  - Monitor all insertion sites, i e  indwelling lines, tubes, and drains  - Monitor endotracheal if appropriate and nasal secretions for changes in amount and color  - [place] appropriate cooling/warming therapies per order  - Administer medications as ordered  - Instruct and encourage patient and family to use good hand hygiene technique  - Identify and instruct in appropriate isolation precautions for identified infection/condition  Outcome: Progressing  Goal: Absence of fever/infection during neutropenic period  Description: INTERVENTIONS:  - Monitor WBC    Outcome: Progressing     Problem: SAFETY ADULT  Goal: Patient will remain free of falls  Description: INTERVENTIONS:  - Educate patient/family on patient safety including physical limitations  - Instruct patient to call for assistance with activity   - Consult OT/PT to assist with strengthening/mobility   - Keep Call bell within reach  - Keep bed low and locked with side rails adjusted as appropriate  - Keep care items and personal belongings within reach  - Initiate and maintain comfort rounds  - Make Fall Risk Sign visible to staff  - Offer Toileting every  Hours, in advance of need  - Initiate/Maintain alarm  - Obtain necessary fall risk management equipment:   - Apply yellow socks and bracelet for high fall risk patients  - Consider moving patient to room near nurses station  Outcome: Progressing  Goal: Maintain or return to baseline ADL function  Description: INTERVENTIONS:  -  Assess patient's ability to carry out ADLs; assess patient's baseline for ADL function and identify physical deficits which impact ability to perform ADLs (bathing, care of mouth/teeth, toileting, grooming, dressing, etc )  - Assess/evaluate cause of self-care deficits   - Assess range of motion  - Assess patient's mobility; develop plan if impaired  - Assess patient's need for assistive devices and provide as appropriate  - Encourage maximum independence but intervene and supervise when necessary  - Involve family in performance of ADLs  - Assess for home care needs following discharge   - Consider OT consult to assist with ADL evaluation and planning for discharge  - Provide patient education as appropriate  Outcome: Progressing  Goal: Maintains/Returns to pre admission functional level  Description: INTERVENTIONS:  - Perform BMAT or MOVE assessment daily    - Set and communicate daily mobility goal to care team and patient/family/caregiver  - Collaborate with rehabilitation services on mobility goals if consulted  - Perform Range of Motion times a day  - Reposition patient every  hours    - Dangle patient  times a day  - Stand patient  times a day  - Ambulate patient  times a day  - Out of bed to chair  times a day   - Out of bed for meals times a day  - Out of bed for toileting  - Record patient progress and toleration of activity level   Outcome: Progressing     Problem: DISCHARGE PLANNING  Goal: Discharge to home or other facility with appropriate resources  Description: INTERVENTIONS:  - Identify barriers to discharge w/patient and caregiver  - Arrange for needed discharge resources and transportation as appropriate  - Identify discharge learning needs (meds, wound care, etc )  - Arrange for interpretive services to assist at discharge as needed  - Refer to Case Management Department for coordinating discharge planning if the patient needs post-hospital services based on physician/advanced practitioner order or complex needs related to functional status, cognitive ability, or social support system  Outcome: Progressing     Problem: Knowledge Deficit  Goal: Patient/family/caregiver demonstrates understanding of disease process, treatment plan, medications, and discharge instructions  Description: Complete learning assessment and assess knowledge base  Interventions:  - Provide teaching at level of understanding  - Provide teaching via preferred learning methods  Outcome: Progressing     Problem: MOBILITY - ADULT  Goal: Maintain or return to baseline ADL function  Description: INTERVENTIONS:  -  Assess patient's ability to carry out ADLs; assess patient's baseline for ADL function and identify physical deficits which impact ability to perform ADLs (bathing, care of mouth/teeth, toileting, grooming, dressing, etc )  - Assess/evaluate cause of self-care deficits   - Assess range of motion  - Assess patient's mobility; develop plan if impaired  - Assess patient's need for assistive devices and provide as appropriate  - Encourage maximum independence but intervene and supervise when necessary  - Involve family in performance of ADLs  - Assess for home care needs following discharge   - Consider OT consult to assist with ADL evaluation and planning for discharge  - Provide patient education as appropriate  Outcome: Progressing  Goal: Maintains/Returns to pre admission functional level  Description: INTERVENTIONS:  - Perform BMAT or MOVE assessment daily    - Set and communicate daily mobility goal to care team and patient/family/caregiver     - Collaborate with rehabilitation services on mobility goals if consulted  - Perform Range of Motion  times a day  - Reposition patient every hours    - Dangle patient  times a day  - Stand patient  times a day  - Ambulate patient  times a day  - Out of bed to chair  times a day   - Out of bed for meals  times a day  - Out of bed for toileting  - Record patient progress and toleration of activity level   Outcome: Progressing

## 2023-03-08 NOTE — RESTORATIVE TECHNICIAN NOTE
Restorative Technician Note      Patient Name: Abel Fuentes     Note Type: Mobility  Patient Position Upon Consult: Bedside chair  Activity Performed: Ambulated  Patient Position at End of Consult: Bedside chair;  All needs within reach

## 2023-03-08 NOTE — DISCHARGE INSTR - AVS FIRST PAGE
PLEASE NOTE THE FOLLOWING MEDICATION RECOMMENDATIONS:  - Discontinue metoprolol  - Take Cardizem  mg daily    Dr Tyesha Alba office will arrange a 2-week event monitor  It will likely be placed at your follow up appointment with Dr Jason Marrero  Please call [phone] with any questions or concerns  RESTRICTIONS:  No heavy lifting or strenuous activity for one week  No soaking in a bath tub/hot tub/swimming pool for one week or until groin heals  You may shower - please let soap and water run over the groins, no scrubbing, and pat the area dry  Please place band-aid on groins daily for up to five days, but you may remove sooner if no issues are noted  If you notice ongoing bleeding, swelling, or firm lumps in groin near ablation incision, please contact Dr Tyesha Alba office - [phone]

## 2023-03-08 NOTE — TELEPHONE ENCOUNTER
Rodriguez Vargas,   This pt needs a 2wk zio XT per Dr Porsche Gamble  She has an upcoming appt with Dr Jennifer Marie on Friday  Any chance you can place the monitor on for us? Matilde,   Does this require auth? Thank you both!

## 2023-03-09 ENCOUNTER — TELEPHONE (OUTPATIENT)
Dept: CARDIOLOGY CLINIC | Facility: CLINIC | Age: 78
End: 2023-03-09

## 2023-03-09 DIAGNOSIS — I50.30 DIASTOLIC HEART FAILURE (HCC): Primary | ICD-10-CM

## 2023-03-09 LAB
ATRIAL RATE: 100 BPM
ATRIAL RATE: 115 BPM
ATRIAL RATE: 116 BPM
ATRIAL RATE: 116 BPM
ATRIAL RATE: 199 BPM
ATRIAL RATE: 202 BPM
ATRIAL RATE: 250 BPM
P AXIS: 25 DEGREES
P AXIS: 28 DEGREES
P AXIS: 51 DEGREES
P AXIS: 64 DEGREES
PR INTERVAL: 146 MS
PR INTERVAL: 172 MS
PR INTERVAL: 178 MS
PR INTERVAL: 180 MS
QRS AXIS: -10 DEGREES
QRS AXIS: -4 DEGREES
QRS AXIS: 10 DEGREES
QRS AXIS: 14 DEGREES
QRS AXIS: 21 DEGREES
QRS AXIS: 22 DEGREES
QRS AXIS: 5 DEGREES
QRSD INTERVAL: 80 MS
QRSD INTERVAL: 84 MS
QRSD INTERVAL: 86 MS
QRSD INTERVAL: 86 MS
QRSD INTERVAL: 88 MS
QT INTERVAL: 226 MS
QT INTERVAL: 242 MS
QT INTERVAL: 260 MS
QT INTERVAL: 306 MS
QT INTERVAL: 324 MS
QT INTERVAL: 336 MS
QT INTERVAL: 350 MS
QTC INTERVAL: 411 MS
QTC INTERVAL: 425 MS
QTC INTERVAL: 436 MS
QTC INTERVAL: 448 MS
QTC INTERVAL: 451 MS
QTC INTERVAL: 458 MS
QTC INTERVAL: 467 MS
T WAVE AXIS: -4 DEGREES
T WAVE AXIS: 16 DEGREES
T WAVE AXIS: 212 DEGREES
T WAVE AXIS: 234 DEGREES
T WAVE AXIS: 243 DEGREES
T WAVE AXIS: 4 DEGREES
T WAVE AXIS: 46 DEGREES
VENTRICULAR RATE: 100 BPM
VENTRICULAR RATE: 115 BPM
VENTRICULAR RATE: 116 BPM
VENTRICULAR RATE: 116 BPM
VENTRICULAR RATE: 187 BPM
VENTRICULAR RATE: 195 BPM
VENTRICULAR RATE: 199 BPM

## 2023-03-09 RX ORDER — FUROSEMIDE 20 MG/1
40 TABLET ORAL DAILY
Qty: 30 TABLET | Refills: 0 | Status: SHIPPED | OUTPATIENT
Start: 2023-03-09

## 2023-03-09 RX ORDER — POTASSIUM CHLORIDE 750 MG/1
20 TABLET, EXTENDED RELEASE ORAL DAILY
Qty: 30 TABLET | Refills: 0 | Status: SHIPPED | OUTPATIENT
Start: 2023-03-09

## 2023-03-09 NOTE — TELEPHONE ENCOUNTER
Pt s/p SVT ablation on 03/06  Pt calling in today c/o swollen ankles  Pt states site looks ok and no concerns there  Pt received IV lasix during admission but was not discharged on a diuretic  Pt inquiring if you'd like to prescribe lasix  Pt denies SOB, CP, not sure if she gained any weight  Currently on her way to  [place]

## 2023-03-09 NOTE — UTILIZATION REVIEW
NOTIFICATION OF ADMISSION DISCHARGE   This is a Notification of Discharge from [facility]  Please be advised that this patient has been discharge from our facility  Below you will find the admission and discharge date and time including the patient’s disposition  UTILIZATION REVIEW CONTACT:  Sivakumar Scruggs  Utilization   Network Utilization Review Department  Phone: [phone] x carefully listen to the prompts  All voicemails are confidential   Email: [email or web address]     ADMISSION INFORMATION  PRESENTATION DATE: 3/2/2023  4:53 PM  OBERVATION ADMISSION DATE:   INPATIENT ADMISSION DATE: 3/2/23  4:53 PM   DISCHARGE DATE: 3/8/2023  3:46 PM   DISPOSITION:Home/Self Care    IMPORTANT INFORMATION:  Send all requests for admission clinical reviews, approved or denied determinations and any other requests to dedicated fax number below belonging to the campus where the patient is receiving treatment   List of dedicated fax numbers:  [address] DENIALS (Administrative/Medical Necessity) [phone]   [address] (Maternity/NICU/Pediatrics) [phone]   [facility] [phone]   [facility] [phone]   [address] [phone]   220 [facility] [phone]   [facility] [phone]   [address] [place] [address] 119 [phone]   [facility]  [phone]   4058 [facility] [phone]   412 [facility] 850 [facility] [phone]

## 2023-03-09 NOTE — DISCHARGE SUMMARY
[facility]  Discharge- Asa Semen 1945, 68 y o  female MRN: [id number]  Unit/Bed#: [facility] 529-01 Encounter: [id number]  Primary Care Provider: Robin Cardenas MD   Date and time admitted to hospital: 3/2/2023  4:53 PM    Hyperlipidemia  Assessment & Plan  Continue Crestor    Diabetes mellitus [facility])  Assessment & Plan  Lab Results   Component Value Date    HGBA1C 6 3 (H) 02/28/2023       Recent Labs     03/07/23  1608 03/07/23  2048 03/08/23  0649 03/08/23  1109   POCGLU 149* 144* 139 147*       Blood Sugar Average: Last 72 hrs:  (P) 822 [id number]    Thyroid nodule  Assessment & Plan  · Incidental finding on CT scan 4 2 cm right thyroid nodule with mass effect on the trachea   Recommend follow-up with nonemergent thyroid ultrasound    Class 1 obesity due to excess calories with serious comorbidity and body mass index (BMI) of 32 0 to 32 9 in adult  Assessment & Plan  · Affects all aspects of her care  · Diet and lifestyle modifications recommended    ABBY (obstructive sleep apnea)  Assessment & Plan  · CPAP QHS    Dyspnea on exertion  Assessment & Plan  · Echocardiogram Systolic function is low normal  Wall motion is normal with the exception of abnormal septal motion  Diastolic function is mildly abnormal, consistent with grade I   · Patient had a cardiac cath in 2016 which was negative, he also had a stress test in 2016 and 2019, patient SVT history is since 2016     · CTA of the chest was negative for PE and pulmonary edema, pneumonia postive   · Likely due to pneumonia  · Improved with treatment; continue to monitor    Pneumonia  Assessment & Plan  · Initially admitted to [facility] with pneumonia and SVT  · Completed 5-day course of IV ceftriaxone while inpatient   · Continue Mucinex      * SVT (supraventricular tachycardia) ([address] )  Assessment & Plan  · Recurrent episodes of SVT while hospitalized with pneumonia at [facility]  · Transferred to [facility] for evaluation by electrophysiology  · Status post SVT ablation 3/6  · Cardiology adding Cardizem, continue to monitor heart rate and also monitor anemia overnight  · Continue to monitor on telemetry      Medical Problems     Resolved Problems  Date Reviewed: 3/2/2023          Resolved    Sepsis ([address] ) 3/5/2023     Resolved by  Marco Kirk DO        Discharging Physician / Practitioner: Matt Zurita  PCP: Sandrita Murrieta MD  Admission Date:   Admission Orders (From admission, onward)     Ordered        03/02/23 1704  Inpatient Admission  Once                      Discharge Date: 03/08/23    Consultations During Hospital Stay:  · Cardiology  · Electrophysiology    Procedures Performed:   · 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) 3/6/2023    Significant Findings / Test Results:   · Wkg 3/6 Svt     Incidental Findings:   · CT showing 4 2 cm R thyroid nodule, outpt followup recommended    Test Results Pending at Discharge (will require follow up): · None     Outpatient Tests Requested:  · Referral for thyroid US    Complications:  None    Reason for Admission: Tachycardia    Hospital Course:   Juaquin Jaeger is a 68 y o  female patient who originally presented to the hospital on 3/2/2023 due to SVT, she was actually a transfer from an outside facility where she was being managed for pneumonia  During her stay she was noted to be persistently tachycardic and so was referred for EP evaluation  When she arrived she was seen by both cardiology and EP  Heart rate persistently around the 110s appearing to show SVT  EP evaluated patient and felt she would be appropriate for ablation  She was taken to the lab on 3/6 for AVNRT ablation, post procedurally she was noted to still be a little tachycardic to the 110s and 120s on telemetry, so she had increasing doses of diltiazem ordered    On the day of discharge, her heart rate remained around the 110s on telemetry, however Trend appear to be overall improving cardiology recommended outpatient follow-up with a Zio patch for further management  Patient herself stated she was feeling improved although felt she had a little bit of lower extremity edema, cardiology ordered a dose of Lasix and advised the patient to keep an eye on her symptoms and let them know if edema was increasing  Please see above list of diagnoses and related plan for additional information  Condition at Discharge: good    Discharge Day Visit / Exam:   Subjective: Sitting in chair at bedside in no acute distress  Vitals: Blood Pressure: 131/76 (03/08/23 1116)  Pulse: 95 (03/08/23 1116)  Temperature: 98 1 °F (36 7 °C) (03/08/23 1116)  Temp Source: Oral (03/07/23 1527)  Respirations: 18 (03/07/23 1850)  Height: 5' 4" (162 6 cm) (03/02/23 1658)  Weight - Scale: 88 4 kg (194 lb 14 2 oz) (03/08/23 0600)  SpO2: 94 % (03/08/23 1116)  Exam:   Physical Exam  Vitals reviewed  Constitutional:       General: She is not in acute distress  Appearance: She is not ill-appearing  HENT:      Head: Normocephalic  Mouth/Throat:      Mouth: Mucous membranes are moist    Eyes:      General: No scleral icterus  Extraocular Movements: Extraocular movements intact  Cardiovascular:      Rate and Rhythm: Normal rate and regular rhythm  Pulses: Normal pulses  Heart sounds: No murmur heard  No friction rub  No gallop  Pulmonary:      Effort: Pulmonary effort is normal  No respiratory distress  Breath sounds: No wheezing, rhonchi or rales  Abdominal:      General: Abdomen is flat  Bowel sounds are normal  There is no distension  Palpations: Abdomen is soft  Tenderness: There is no abdominal tenderness  There is no guarding or rebound  Musculoskeletal:      Right lower leg: Edema present  Left lower leg: Edema present  Comments: Trace BL LE edema   Skin:     General: Skin is warm        Capillary Refill: Capillary refill takes less than 2 seconds  Coloration: Skin is not jaundiced  Findings: No rash  Neurological:      General: No focal deficit present  Mental Status: She is alert and oriented to person, place, and time  Sensory: No sensory deficit  Motor: No weakness  Psychiatric:         Mood and Affect: Mood normal          Behavior: Behavior normal           Discussion with Family: Patient declined call to   Discharge instructions/Information to patient and family:   See after visit summary for information provided to patient and family  Provisions for Follow-Up Care:  See after visit summary for information related to follow-up care and any pertinent home health orders  Disposition:   Home    Planned Readmission: None     Discharge Statement:  I spent 45 minutes discharging the patient  This time was spent on the day of discharge  I had direct contact with the patient on the day of discharge  Greater than 50% of the total time was spent examining patient, answering all patient questions, arranging and discussing plan of care with patient as well as directly providing post-discharge instructions  Additional time then spent on discharge activities  Discharge Medications:  See after visit summary for reconciled discharge medications provided to patient and/or family        **Please Note: This note may have been constructed using a voice recognition system**

## 2023-03-09 NOTE — TELEPHONE ENCOUNTER
We are Par with Pt insurance  I did call Pt and left a detailed message making Pt aware that we do accept her insurance  Also advised her to check with her insurance to see if the Ray [facility]ancoast is covered

## 2023-03-09 NOTE — ASSESSMENT & PLAN NOTE
· Initially admitted to [facility] with pneumonia and SVT  · Completed 5-day course of IV ceftriaxone while inpatient   · Continue Mucinex

## 2023-03-09 NOTE — TELEPHONE ENCOUNTER
Per [employer], pt prescribed lasix 40mg daily and potassium 20mg daily  Pt should take meds for 3 days and call us with an update on swelling before continuing w meds    I spoke w patient  She verbalized understanding  S/p right shoulder scope, SAD, RC repair, biceps tenodesis, DOS 6/4/2021.     Called patient to discuss. Patient states she is overall doing okay since surgery. Has been having pain, but this is well controlled with the Percocet. C/o swelling in bilateral feet/ankles over past day or two. Has been using the compression stockings during the day, and taking off at night. Noted the swelling in the ankles yesterday. States it is slightly improved today, but still present. Denies any calf pain, tightness, tenderness, or redness. Just having swelling. RN discussed that swelling can occur after surgery, and can move into ankles/feet with gravity. Recommended resting and elevating both feet whenever seated. Can use pillows in addition to foot rest to elevate ankles up above level of the heart. Patient will try this. Will also see if sister can visit today, and can help with icing the ankles and putting the compression stockings on for the day, and removing at night. Patient verbalized understanding and appreciation. No other needs at this time.

## 2023-03-09 NOTE — ASSESSMENT & PLAN NOTE
Lab Results   Component Value Date    HGBA1C 6 3 (H) 02/28/2023       Recent Labs     03/07/23  1608 03/07/23  2048 03/08/23  0649 03/08/23  1109   POCGLU 149* 144* 139 147*       Blood Sugar Average: Last 72 hrs:  (P) 666 [id number]

## 2023-03-09 NOTE — ASSESSMENT & PLAN NOTE
· Recurrent episodes of SVT while hospitalized with pneumonia at [facility]  · Transferred to [facility] for evaluation by electrophysiology  · Status post SVT ablation 3/6  · Cardiology adding Cardizem, continue to monitor heart rate and also monitor anemia overnight  · Continue to monitor on telemetry

## 2023-03-10 ENCOUNTER — OFFICE VISIT (OUTPATIENT)
Dept: CARDIOLOGY CLINIC | Facility: CLINIC | Age: 78
End: 2023-03-10

## 2023-03-10 VITALS
WEIGHT: 185.6 LBS | SYSTOLIC BLOOD PRESSURE: 110 MMHG | BODY MASS INDEX: 31.69 KG/M2 | HEART RATE: 100 BPM | HEIGHT: 64 IN | DIASTOLIC BLOOD PRESSURE: 60 MMHG | OXYGEN SATURATION: 93 %

## 2023-03-10 DIAGNOSIS — G47.33 OSA (OBSTRUCTIVE SLEEP APNEA): ICD-10-CM

## 2023-03-10 DIAGNOSIS — I47.1 SVT (SUPRAVENTRICULAR TACHYCARDIA) (HCC): Primary | ICD-10-CM

## 2023-03-10 DIAGNOSIS — J18.9 PNEUMONIA OF BOTH LUNGS DUE TO INFECTIOUS ORGANISM, UNSPECIFIED PART OF LUNG: ICD-10-CM

## 2023-03-10 DIAGNOSIS — E11.9 TYPE 2 DIABETES MELLITUS WITHOUT COMPLICATION, WITHOUT LONG-TERM CURRENT USE OF INSULIN (HCC): ICD-10-CM

## 2023-03-10 DIAGNOSIS — I10 PRIMARY HYPERTENSION: ICD-10-CM

## 2023-03-10 DIAGNOSIS — E78.2 MIXED HYPERLIPIDEMIA: ICD-10-CM

## 2023-03-10 DIAGNOSIS — E66.09 CLASS 1 OBESITY DUE TO EXCESS CALORIES WITH SERIOUS COMORBIDITY AND BODY MASS INDEX (BMI) OF 32.0 TO 32.9 IN ADULT: ICD-10-CM

## 2023-03-10 DIAGNOSIS — R06.09 DYSPNEA ON EXERTION: ICD-10-CM

## 2023-03-10 NOTE — PROGRESS NOTES
Cardiology Office Visit    Melissa Brooks  [id number]  1945      [facility]  [address]  [facility] [address] [phone]      Dear Lawrence Castillo MD,    I had the pleasure of seeing your patient at our [address] [facility] office today 3/10/23  As you know she is a pleasant 68y o  year old female with a medical history as described below  Reason for office visit: Follow up hospitalization for SVT, hypertension and hyperlipidemia  1  SVT (supraventricular tachycardia) ([address] )  Comments:  recurrent  Assessment & Plan:  History of SVT dating back to 2016  Presented to [address] [place] [address] 2/28/2023 with increasing frequency of SVT episodes  Pneumonia noted on CTA chest    SVT at 216 bpm was noted on presentation with frequent episodes noted (s/p adenosine x 3 in the ER)  WCT noted as well (thought to be SVT with aberrancy)  Transferred to [facility] for EP evaluation and underwent AVNRT and AT ablation 3/6/2023 (Dr Milton Varela)  Noted to have PAT post ablation  ECG today 3/10/2023 shows sinus tachycardia at 106 bpm    Continue Cardizem  mg daily  Resume metoprolol tartrate 25 mg BID  I have asked her to monitor her heart rates and to call the office on Monday with update  She does have follow up with Dr Milton Varela 4/18/2023    14 day ZIO applied today  Orders:  -     Ambulatory Referral to Cardiology  -     POCT ECG  -     metoprolol tartrate (LOPRESSOR) 25 mg tablet; Take 1 tablet (25 mg total) by mouth every 12 (twelve) hours    2  Primary hypertension  Assessment & Plan:  Blood pressure is well controlled on exam today  Continue diltiazem 240 mg daily and losartan 25 mg daily  Add metoprolol tartrate 25 mg BID (for tachycardia)  3  Mixed hyperlipidemia  Assessment & Plan:  Lipid panel 3/1/2023: C 104  T 96  H 42  L 43  Rosuvastatin 5 mg daily     Goal LDL < 70        4  Pneumonia of both lungs due to infectious organism, unspecified part of lung  Assessment & Plan:  Patient noted to have pneumonia on presentation to the ER 2/28/2023  Improving symptoms s/p treatment  5  Dyspnea on exertion  Assessment & Plan:  Likely due to pneumonia on admission 2/28/2023  Symptoms improved  Will continue to monitor for resolution  Patient was noted to be volume overloaded at time of discharge from hospital    She was given IV furosemide 10 mg x 2 and after discharge given 40 mg of oral furosemide x 3 days along with potassium supplementation  Appears euvolemic on exam today  Would hold additional diuretics (completed 3 days)  6  ABBY (obstructive sleep apnea)  Assessment & Plan:  Obstructive sleep apnea by history  Compliant with CPAP use  7  Type 2 diabetes mellitus without complication, without long-term current use of insulin (HCC)  Assessment & Plan:    Lab Results   Component Value Date    HGBA1C 6 3 (H) 02/28/2023     Managed by PCP  8  Class 1 obesity due to excess calories with serious comorbidity and body mass index (BMI) of 32 0 to 32 9 in adult  Assessment & Plan: Body mass index is 31 86 kg/m²  Would benefit from dietary modification and weight loss  Will discuss starting a walking program at follow up as she is still weak from her recent admission  HPI   Juan Pagan has a past medical history of SVT, diabetes, hypertension, hyperlipidemia and ABBY on CPAP  Patient was following with Dr Sergio Briones at [address] [place] [address] Cardiology  She is transitioning her care to [place] [address] Cardiology  Patient has a history of SVT dating back to 2016 for which she was placed on metoprolol tartrate 25 mg BID  She underwent cardiac catheterization at that time which showed normal coronaries  She was presented to [address] [place] [address] ER 2/28/2023 with increasing frequency of SVT/rapid heart rates  ECG on presentation showed SVT at 216 bpm  She was given adenosine x 3 in the ER for recurrent SVT   CT of chest with likely bilateral pneumonia  She was noted to have a WCT on presentation to the floor and transferred to the ICU  She was transferred to [facility] for EP evaluation  * Anastasia underwent AVNRT and atrial tachycardia ablation (near tricuspid valve) 3/6/2023  She was discharged home 3/8/2023 on Cardizem  mg daily and a 2 week ZIO was recommended  She was given furosemide x 2 10 mg given for volume overload  She was started on furosemide 40 mg with 20 mg of potassium daily for 3 days      3/10/2023: Milton Saenz presents to the office today for follow up from her very recent hospitalization  She had noted lower extremity edema after discharge and called cardiology at [facility] and was started on furosemide 40 mg with potassium 20 mg daily for 3 days  She completed treatment for pneumonia  She feels her breathing is better  She does continue to note increased heart rates  ECG today shows sinus tachycardia with PAC's at 106 bpm  She does feel weak since discharge  She has follow up with Dr Jacqueline Alvares 4/18/2023  Patient Active Problem List   Diagnosis   • SVT (supraventricular tachycardia) (HCC)   • Pneumonia   • Dyspnea on exertion   • ABBY (obstructive sleep apnea)   • Class 1 obesity due to excess calories with serious comorbidity and body mass index (BMI) of 32 0 to 32 9 in adult   • Thyroid nodule   • Hypertension   • Diabetes mellitus ([facility] )   • Hyperlipidemia     Past Medical History:   Diagnosis Date   • Diabetes mellitus ([facility] )    • Hyperlipidemia    • Hypertension    • ABBY (obstructive sleep apnea)     on CPAP   • [facility]      Social History     Socioeconomic History   • Marital status:       Spouse name: Not on file   • Number of children: Not on file   • Years of education: Not on file   • Highest education level: Not on file   Occupational History   • Not on file   Tobacco Use   • Smoking status: Former     Types: Cigarettes     Quit date: 1/1/1989     Years since quittin 2   • Smokeless tobacco: Never   Vaping Use   • Vaping Use: Never used   Substance and Sexual Activity   • Alcohol use: Not Currently   • Drug use: Never   • Sexual activity: Not on file     Comment: defer   Other Topics Concern   • Not on file   Social History Narrative   • Not on file     Social Determinants of Health     Financial Resource Strain: Not on file   Food Insecurity: No Food Insecurity   • Worried About Running Out of Food in the Last Year: Never true   • Ran Out of Food in the Last Year: Never true   Transportation Needs: No Transportation Needs   • Lack of Transportation (Medical): No   • Lack of Transportation (Non-Medical): No   Physical Activity: Not on file   Stress: Not on file   Social Connections: Not on file   Intimate Partner Violence: Not on file   Housing Stability: Low Risk    • Unable to Pay for Housing in the Last Year: No   • Number of Places Lived in the Last Year: 1   • Unstable Housing in the Last Year: No      History reviewed  No pertinent family history  Past Surgical History:   Procedure Laterality Date   • BREAST SURGERY     • CARDIAC ELECTROPHYSIOLOGY PROCEDURE N/A 3/6/2023    Procedure: Cardiac eps/svt ablation;  Surgeon: Aye Velazco MD;  Location: [facility];   Service: Cardiology   •  SECTION     • NOSE SURGERY     • TONSILLECTOMY         Current Outpatient Medications:   •  aspirin (ECOTRIN LOW STRENGTH) 81 mg EC tablet, Take 81 mg by mouth daily, Disp: , Rfl:   •  Cholecalciferol 125 MCG (5000 UT) capsule, Take 1 tablet by mouth daily, Disp: , Rfl:   •  diltiazem (CARDIZEM CD) 240 mg 24 hr capsule, Take 1 capsule (240 mg total) by mouth daily Do not start before 2023 , Disp: 30 capsule, Rfl: 3  •  furosemide (LASIX) 20 mg tablet, Take 2 tablets (40 mg total) by mouth daily, Disp: 30 tablet, Rfl: 0  •  losartan (COZAAR) 25 mg tablet, Take 1 tablet (25 mg total) by mouth daily, Disp: , Rfl: 0  •  Melatonin 5 MG CAPS, Take by mouth daily at bedtime, Disp: , Rfl:   •  metFORMIN (GLUCOPHAGE) 500 mg tablet, Take 500 mg by mouth daily, Disp: , Rfl:   •  metoprolol tartrate (LOPRESSOR) 25 mg tablet, Take 1 tablet (25 mg total) by mouth every 12 (twelve) hours, Disp: 180 tablet, Rfl: 3  •  Multiple Vitamins-Minerals (CENTRUM SILVER PO), Take by mouth in the morning, Disp: , Rfl:   •  potassium chloride (K-DUR,KLOR-CON) 10 mEq tablet, Take 2 tablets (20 mEq total) by mouth daily, Disp: 30 tablet, Rfl: 0  •  rosuvastatin (CRESTOR) 5 mg tablet, Take 5 mg by mouth daily Monday, Wednesday, Friday, Disp: , Rfl:   •  benzonatate (TESSALON PERLES) 100 mg capsule, Take 100 mg by mouth 3 (three) times a day as needed for cough (Patient not taking: Reported on 3/10/2023), Disp: , Rfl:        Allergies   Allergen Reactions   • Lisinopril Cough     ace cough  ace cough    Ace-cough   • Naproxen Palpitations   • Pseudoephedrine Palpitations       Cardiac Test Results:     ECG 3/10/2023: Sinus tachycardia  106 bpm  Low voltage QRS  Non specific ST T wave abnormality  Echocardiogram 3/1/2023:   EF 50%  Abnormal septal motion  Mild diastolic dysfunction  LAE    MAC  Trace to mild MR  Mild TR  Mild pHTN with estimated RVSP 36 mmHg  Upper normal aortic root size  3 6 cm  Lipid panel 3/1/2023: C 104  T 96  H 42  L 43  Review of Systems   Constitutional: Positive for fatigue  Negative for activity change and appetite change  HENT: Negative for congestion, hearing loss, tinnitus and trouble swallowing  Eyes: Negative for visual disturbance  Respiratory: Positive for shortness of breath  Negative for cough, chest tightness and wheezing  Cardiovascular: Positive for leg swelling  Negative for chest pain and palpitations  Gastrointestinal: Negative for abdominal distention, abdominal pain, nausea and vomiting  Genitourinary: Negative for difficulty urinating  Musculoskeletal: Negative for arthralgias  Skin: Negative for rash  Bruising from hospital   Neurological: Negative for dizziness, syncope and light-headedness  Hematological: Does not bruise/bleed easily  Psychiatric/Behavioral: Negative for confusion  The patient is not nervous/anxious  All other systems reviewed and are negative  Vitals:    03/10/23 1403 03/10/23 1453   BP: 108/60 110/60   Pulse: 100    SpO2: 93%    Weight: 84 2 kg (185 lb 9 6 oz)    Height: 5' 4" (1 626 m)      Vitals:    03/10/23 1403   Weight: 84 2 kg (185 lb 9 6 oz)     Height: 5' 4" (162 6 cm)     Physical Exam  Vitals reviewed  Constitutional:       Appearance: She is well-developed  HENT:      Head: Normocephalic and atraumatic  Eyes:      Conjunctiva/sclera: Conjunctivae normal       Pupils: Pupils are equal, round, and reactive to light  Neck:      Vascular: No JVD  Cardiovascular:      Rate and Rhythm: Regular rhythm  Tachycardia present  Occasional extrasystoles are present  Heart sounds: Normal heart sounds  No murmur heard  No friction rub  No gallop  Pulmonary:      Effort: Pulmonary effort is normal       Breath sounds: Normal breath sounds  Abdominal:      General: Bowel sounds are normal       Palpations: Abdomen is soft  Musculoskeletal:      Cervical back: Normal range of motion  Right lower leg: Edema (trivial) present  Left lower leg: Edema (trivial) present  Skin:     General: Skin is warm and dry  Neurological:      Mental Status: She is alert and oriented to person, place, and time     Psychiatric:         Behavior: Behavior normal

## 2023-03-10 NOTE — PATIENT INSTRUCTIONS
Continue current medications  Add metoprolol tartrate 25 mg twice daily  Monitor heart rates at home and call the office on Monday and let me know how your heart rates are doing  Call me if you have any questions or concerns  14 day STEPHON

## 2023-03-11 NOTE — ASSESSMENT & PLAN NOTE
Blood pressure is well controlled on exam today  Continue diltiazem 240 mg daily and losartan 25 mg daily  Add metoprolol tartrate 25 mg BID (for tachycardia)

## 2023-03-11 NOTE — ASSESSMENT & PLAN NOTE
History of SVT dating back to 2016  Presented to [address] [place] [address] 2/28/2023 with increasing frequency of SVT episodes  Pneumonia noted on CTA chest    SVT at 216 bpm was noted on presentation with frequent episodes noted (s/p adenosine x 3 in the ER)  WCT noted as well (thought to be SVT with aberrancy)  Transferred to [facility] for EP evaluation and underwent AVNRT and AT ablation 3/6/2023 (Dr Renetta Catalan)  Noted to have PAT post ablation  ECG today 3/10/2023 shows sinus tachycardia at 106 bpm    Continue Cardizem  mg daily  Resume metoprolol tartrate 25 mg BID  I have asked her to monitor her heart rates and to call the office on Monday with update  She does have follow up with Dr Renetta Catalan 4/18/2023    14 day ZIO applied today

## 2023-03-11 NOTE — ASSESSMENT & PLAN NOTE
Likely due to pneumonia on admission 2/28/2023  Symptoms improved  Will continue to monitor for resolution  Patient was noted to be volume overloaded at time of discharge from hospital    She was given IV furosemide 10 mg x 2 and after discharge given 40 mg of oral furosemide x 3 days along with potassium supplementation  Appears euvolemic on exam today  Would hold additional diuretics (completed 3 days)

## 2023-03-11 NOTE — ASSESSMENT & PLAN NOTE
Patient noted to have pneumonia on presentation to the ER 2/28/2023  Improving symptoms s/p treatment

## 2023-03-11 NOTE — ASSESSMENT & PLAN NOTE
Body mass index is 31 86 kg/m²  Would benefit from dietary modification and weight loss  Will discuss starting a walking program at follow up as she is still weak from her recent admission

## 2023-03-13 ENCOUNTER — TELEPHONE (OUTPATIENT)
Dept: CARDIOLOGY CLINIC | Facility: CLINIC | Age: 78
End: 2023-03-13

## 2023-03-13 LAB
ATRIAL RATE: 105 BPM
P AXIS: 50 DEGREES
PR INTERVAL: 112 MS
QRS AXIS: 5 DEGREES
QRSD INTERVAL: 86 MS
QT INTERVAL: 336 MS
QTC INTERVAL: 444 MS
T WAVE AXIS: 26 DEGREES
VENTRICULAR RATE: 105 BPM

## 2023-03-13 NOTE — TELEPHONE ENCOUNTER
Please let her know her heart rates are better than they were in the office  Continue the metoprolol and diltiazem at current doses  She should let us know if her HR stays > 100 bpm   Thank you!

## 2023-03-13 NOTE — TELEPHONE ENCOUNTER
Pt called with her recent HR     3/11 took her metoprolol at 7 am- HR then was 98          Noon  79          3 pm  100 (took it a few mins later 87)          5:15 pm 92          Took second metoprolol at 7 pm- was 93          8 pm 81          9:30 pm 78          Before bed 82    3/12 took metoprolol at 7 am 91           8:30 am 79         10:30 am 82          Noon 83          1:30 pm 90          2 pm 87          5pm 86          6:45 pm 94         Took second Metoprolol at 7 pm          8:20 pm 81

## 2023-03-13 NOTE — TELEPHONE ENCOUNTER
Pt elias stating that she would like to speak to someone about her heart rate over the weekend   Please call [phone]

## 2023-03-23 ENCOUNTER — TELEPHONE (OUTPATIENT)
Dept: UROLOGY | Facility: AMBULATORY SURGERY CENTER | Age: 78
End: 2023-03-23

## 2023-03-23 ENCOUNTER — TELEPHONE (OUTPATIENT)
Dept: FAMILY MEDICINE CLINIC | Facility: CLINIC | Age: 78
End: 2023-03-23

## 2023-03-23 NOTE — TELEPHONE ENCOUNTER
RECEIVED NOTIFICATION FROM [facility] UR[facility]  PT SCHEDULED FOR 4/6  INSURANCE IS NOT IN NETWORK  CANCELLED APPT AND SHE WILL CALL PROVIDER TO FIND SOMEONE THAT ACCEPTS THE INSURANCE

## 2023-03-23 NOTE — TELEPHONE ENCOUNTER
The patient is scheduled to be seen in the office on 4/6, and the insurance that the patient has does not participate with [address] [place] [address] Urolog  The appointment should be canceled, and the patient should be notified that she needs to find a provider that participates with her insurance

## 2023-04-03 ENCOUNTER — TELEPHONE (OUTPATIENT)
Dept: CARDIOLOGY CLINIC | Facility: CLINIC | Age: 78
End: 2023-04-03

## 2023-04-03 ENCOUNTER — CLINICAL SUPPORT (OUTPATIENT)
Dept: CARDIOLOGY CLINIC | Facility: CLINIC | Age: 78
End: 2023-04-03

## 2023-04-03 DIAGNOSIS — I47.1 SVT (SUPRAVENTRICULAR TACHYCARDIA) (HCC): ICD-10-CM

## 2023-04-03 NOTE — TELEPHONE ENCOUNTER
Spoke with patient and review ZIO results  She had some short episodes of SVT, longest 12 beats  She was not symptomatic with these  AVG HR 79 bpm   Her triggers were with sinus rhythm, ST, and SR with PAC's/PVC's  She is meeting with EP 4/18  She states she is feeling very well currently  As she is without complaints currently I will not adjust her medication  I asked her to call if she notices increasing heart rates before her visit with EP  She verbalized agreement

## 2023-04-19 PROBLEM — R31.9 HEMATURIA: Status: ACTIVE | Noted: 2023-04-19

## 2023-05-03 ENCOUNTER — TELEPHONE (OUTPATIENT)
Dept: UROLOGY | Facility: AMBULATORY SURGERY CENTER | Age: 78
End: 2023-05-03

## 2023-05-03 NOTE — TELEPHONE ENCOUNTER
This patient has a insurance that Cristofer Salvador is non-par with this insurance  So the patient needs to be removed from the scheduled or informed, if she is seen on 5/17 she will be a self pay patient

## 2023-05-09 PROBLEM — J18.9 PNEUMONIA: Status: RESOLVED | Noted: 2023-02-28 | Resolved: 2023-05-09

## 2023-06-12 DIAGNOSIS — I47.1 SVT (SUPRAVENTRICULAR TACHYCARDIA) (HCC): ICD-10-CM

## 2023-06-12 RX ORDER — DILTIAZEM HYDROCHLORIDE 240 MG/1
CAPSULE, COATED, EXTENDED RELEASE ORAL
Qty: 30 CAPSULE | Refills: 3 | Status: SHIPPED | OUTPATIENT
Start: 2023-06-12

## 2023-06-15 ENCOUNTER — PROCEDURE VISIT (OUTPATIENT)
Dept: UROLOGY | Facility: CLINIC | Age: 78
End: 2023-06-15
Payer: COMMERCIAL

## 2023-06-15 VITALS
OXYGEN SATURATION: 96 % | WEIGHT: 183 LBS | DIASTOLIC BLOOD PRESSURE: 68 MMHG | TEMPERATURE: 97.4 F | BODY MASS INDEX: 31.24 KG/M2 | HEIGHT: 64 IN | HEART RATE: 62 BPM | SYSTOLIC BLOOD PRESSURE: 126 MMHG

## 2023-06-15 DIAGNOSIS — R31.9 HEMATURIA, UNSPECIFIED TYPE: Primary | ICD-10-CM

## 2023-06-15 LAB
SL AMB  POCT GLUCOSE, UA: ABNORMAL
SL AMB LEUKOCYTE ESTERASE,UA: ABNORMAL
SL AMB POCT BILIRUBIN,UA: ABNORMAL
SL AMB POCT BLOOD,UA: ABNORMAL
SL AMB POCT CLARITY,UA: CLEAR
SL AMB POCT COLOR,UA: ABNORMAL
SL AMB POCT KETONES,UA: ABNORMAL
SL AMB POCT NITRITE,UA: ABNORMAL
SL AMB POCT PH,UA: 5.5
SL AMB POCT SPECIFIC GRAVITY,UA: 1.02
SL AMB POCT URINE PROTEIN: ABNORMAL
SL AMB POCT UROBILINOGEN: 0.2

## 2023-06-15 PROCEDURE — 81003 URINALYSIS AUTO W/O SCOPE: CPT | Performed by: UROLOGY

## 2023-06-15 PROCEDURE — 52000 CYSTOURETHROSCOPY: CPT | Performed by: UROLOGY

## 2023-06-15 NOTE — PROGRESS NOTES
"UROLOGY PROGRESS NOTE         NAME: Ant Arguelles  AGE: 68 y o  SEX: female  : 1945   MRN: [id number]    DATE: 6/15/2023  TIME: 9:53 AM    Assessment and Plan      Impression:   1  Hematuria, unspecified type  -     POCT urine dip auto non-scope  -     Cystoscopy    Work-up for hematuria negative  Cytology was atypical   Cystoscopy is negative  CAT scan negative   Plan: She will follow-up in 10 years if the microhematuria persist   She will follow-up sooner if she develops urinary symptoms or gross hematuria  Chief Complaint     Chief Complaint   Patient presents with   • Cystoscopy     History of Present Illness     HPI: Ant Arguelles is a 68y o  year old female who presents with here for cystoscopy  See separate note              The following portions of the patient's history were reviewed and updated as appropriate: allergies, current medications, past family history, past medical history, past social history, past surgical history and problem list   Past Medical History:   Diagnosis Date   • Diabetes mellitus ([facility] )    • Hyperlipidemia    • Hypertension    • ABBY (obstructive sleep apnea)     on CPAP   • SVT (supraventricular tachycardia) ([facility] )      Past Surgical History:   Procedure Laterality Date   • BREAST SURGERY     • CARDIAC ELECTROPHYSIOLOGY PROCEDURE N/A 2023    Procedure: Cardiac eps/svt ablation;  Surgeon: Terry Spring MD;  Location: [facility]; Service: Cardiology   •  SECTION     • NOSE SURGERY     • TONSILLECTOMY     • TUBAL LIGATION       shoulder  Review of Systems     Const: Denies chills, fever and weight loss  CV: Denies chest pain  Resp: Denies SOB  GI: Denies abdominal pain, nausea and vomiting  : Denies symptoms other than stated above  Musculo: Denies back pain      Objective   /68 (BP Location: Left arm, Patient Position: Sitting)   Pulse 62   Temp (!) 97 4 °F (36 3 °C)   Ht 5' 4\" (1 626 m)   Wt 83 kg (183 lb)   " SpO2 96%   BMI 31 41 kg/m²     Physical Exam  Const: Appears healthy and well developed  No signs of acute distress present  Resp: Respirations are regular and unlabored  CV: Rate is regular  Rhythm is regular  Abdomen: Abdomen is soft, nontender, and nondistended  Kidneys are not palpable  : Normal  Psych: Patient's attitude is cooperative   Mood is normal  Affect is normal     Current Medications     Current Outpatient Medications:   •  aspirin (ECOTRIN LOW STRENGTH) 81 mg EC tablet, Take 81 mg by mouth daily, Disp: , Rfl:   •  Cholecalciferol 125 MCG (5000 UT) capsule, Take 1 tablet by mouth daily, Disp: , Rfl:   •  diltiazem (CARDIZEM CD) 240 mg 24 hr capsule, take 1 capsule by mouth once daily, Disp: 30 capsule, Rfl: 3  •  losartan (COZAAR) 25 mg tablet, Take 1 tablet (25 mg total) by mouth daily, Disp: , Rfl: 0  •  Melatonin 5 MG CAPS, Take by mouth daily at bedtime, Disp: , Rfl:   •  metFORMIN (GLUCOPHAGE-XR) 500 mg 24 hr tablet, , Disp: , Rfl:   •  metoprolol tartrate (LOPRESSOR) 25 mg tablet, Take 1 tablet (25 mg total) by mouth every 12 (twelve) hours, Disp: 180 tablet, Rfl: 3  •  Multiple Vitamins-Minerals (CENTRUM SILVER PO), Take by mouth in the morning, Disp: , Rfl:   •  rosuvastatin (CRESTOR) 5 mg tablet, Take 5 mg by mouth daily Monday, Wednesday, Friday, Disp: , Rfl:         Jamaica Kaur MD

## 2023-06-15 NOTE — PROGRESS NOTES
"   Cystoscopy     Date/Time 6/15/2023 9:30 AM     Performed by  Pippa Stewart MD   Authorized by Pippa Stewart MD     Universal Protocol:  Consent: Verbal consent obtained  Written consent obtained  Risks and benefits: risks, benefits and alternatives were discussed  Consent given by: patient  Time out: Immediately prior to procedure a \"time out\" was called to verify the correct patient, procedure, equipment, support staff and site/side marked as required  Timeout called at: 6/15/2023 9:51 AM   Patient identity confirmed: verbally with patient        Procedure Details:  Procedure type: cystoscopy    Patient tolerance: Patient tolerated the procedure well with no immediate complications    Additional Procedure Details: Patient underwent digital flexible cystoscopy  She was prepped with Betadine lidocaine jelly  Dorsolithotomy position  Cystoscopy revealed a normal urethra  She had a small Hutch diverticulum  There were no tumors in the bladder  No erythema in the bladder  The orifices were normal   There were no stones in the bladder  Patient Toller procedure well  She was able to visualize the findings  1 Keflex given        "

## 2023-07-12 ENCOUNTER — TELEPHONE (OUTPATIENT)
Dept: CARDIOLOGY CLINIC | Facility: CLINIC | Age: 78
End: 2023-07-12

## 2023-07-12 NOTE — TELEPHONE ENCOUNTER
Pt called office this morning looking for a Referral to Vascular Surgery per her PCP. Sent message to Jaylan Diaz for advice.

## 2023-07-12 NOTE — TELEPHONE ENCOUNTER
I called patient for clarification. Her PCP got a back xray that showed aortic calcification and wants her referred to a vascular surgeon. I would like to review the xray. Can you please call Dr. Carisa Pringle office Wale Licea ordered the xray) and get a copy of the xray report for my review? Thank you!

## 2023-08-30 ENCOUNTER — HOSPITAL ENCOUNTER (OUTPATIENT)
Dept: ULTRASOUND IMAGING | Facility: HOSPITAL | Age: 78
Discharge: HOME | End: 2023-08-30
Payer: COMMERCIAL

## 2023-08-30 DIAGNOSIS — R93.89 ABNORMAL X-RAY: ICD-10-CM

## 2023-08-30 PROCEDURE — 76775 US EXAM ABDO BACK WALL LIM: CPT

## 2023-09-05 ENCOUNTER — TELEPHONE (OUTPATIENT)
Dept: CARDIOLOGY CLINIC | Facility: CLINIC | Age: 78
End: 2023-09-05

## 2023-09-05 NOTE — TELEPHONE ENCOUNTER
Cristela my name is Amanda Jerome. Birth date is [birth year]. Doctor Kesha Lucas did an ablation on me in March. I would like to have a report on ambulation sent to Doctor Norberto Ulloa at [facility]. I do have the fax number for that. OK, thank you very much. You can call me at [phone]. Thank you.  Lucila.

## 2023-09-05 NOTE — TELEPHONE ENCOUNTER
Called pt back for fax #. Ablation report from 3/6/23 faxed to [facility], Attn: Dr. Jonah Torres @ [phone].

## 2023-09-06 ENCOUNTER — TELEPHONE (OUTPATIENT)
Dept: CARDIOLOGY CLINIC | Facility: CLINIC | Age: 78
End: 2023-09-06

## 2023-09-06 DIAGNOSIS — I73.9 CLAUDICATION (HCC): Primary | ICD-10-CM

## 2023-09-06 DIAGNOSIS — I70.0 AORTIC ATHEROSCLEROSIS (HCC): ICD-10-CM

## 2023-09-06 NOTE — TELEPHONE ENCOUNTER
----- Message from Ronaldo Kaplan sent at 9/5/2023  4:13 PM EDT -----  Please let Yareli Perez know that her ultrasound showed plaque in the blood vessels. If she is having any symptoms of reduced blood flow to the legs with cramping/pain color or temperature change she should make us aware. I will discuss in detail at follow up. Thank you!

## 2023-09-06 NOTE — TELEPHONE ENCOUNTER
She feels that she should see a vascular surgeon. Please advise. She is concerned now that she really thinks about it.

## 2023-09-06 NOTE — TELEPHONE ENCOUNTER
Pt is aware. States that she has had pain in her legs for years, states that they have a different color to them for a while because of her thinning skin and the verucose veins.

## 2023-09-07 NOTE — TELEPHONE ENCOUNTER
I called and discussed further with [facility]. We reviewed her ultrasound result. She is taking aspirin and statin. She does experience leg cramping. We will proceed with arterial imaging with exercise prior to vascular surgery consult to evaluate for PAD. She is agreeable. Order created for SOFIA with exercise - can you please schedule? Can you also change her next follow up to be with me?   Thank you!!

## 2023-09-07 NOTE — TELEPHONE ENCOUNTER
Called and spoke with patient and she is going to call central scheduling to get SOFIA w/ exercise schedule then call office to schedule follow up appointment with Gina Hoffman

## 2023-10-23 DIAGNOSIS — I47.10 SVT (SUPRAVENTRICULAR TACHYCARDIA): ICD-10-CM

## 2023-10-23 RX ORDER — DILTIAZEM HYDROCHLORIDE 240 MG/1
CAPSULE, COATED, EXTENDED RELEASE ORAL
Qty: 30 CAPSULE | Refills: 3 | Status: SHIPPED | OUTPATIENT
Start: 2023-10-23 | End: 2023-10-24 | Stop reason: SDUPTHER

## 2023-10-24 ENCOUNTER — OFFICE VISIT (OUTPATIENT)
Dept: CARDIOLOGY CLINIC | Facility: CLINIC | Age: 78
End: 2023-10-24
Payer: COMMERCIAL

## 2023-10-24 VITALS
WEIGHT: 184 LBS | HEART RATE: 84 BPM | SYSTOLIC BLOOD PRESSURE: 122 MMHG | DIASTOLIC BLOOD PRESSURE: 70 MMHG | BODY MASS INDEX: 31.41 KG/M2 | OXYGEN SATURATION: 97 % | HEIGHT: 64 IN

## 2023-10-24 DIAGNOSIS — E11.9 TYPE 2 DIABETES MELLITUS WITHOUT COMPLICATION, WITHOUT LONG-TERM CURRENT USE OF INSULIN (HCC): ICD-10-CM

## 2023-10-24 DIAGNOSIS — I10 PRIMARY HYPERTENSION: ICD-10-CM

## 2023-10-24 DIAGNOSIS — E78.2 MIXED HYPERLIPIDEMIA: ICD-10-CM

## 2023-10-24 DIAGNOSIS — G47.33 OSA (OBSTRUCTIVE SLEEP APNEA): ICD-10-CM

## 2023-10-24 DIAGNOSIS — I47.10 SVT (SUPRAVENTRICULAR TACHYCARDIA): Primary | ICD-10-CM

## 2023-10-24 DIAGNOSIS — I70.0 AORTIC ATHEROSCLEROSIS (HCC): ICD-10-CM

## 2023-10-24 DIAGNOSIS — E04.1 THYROID NODULE: ICD-10-CM

## 2023-10-24 PROCEDURE — 99214 OFFICE O/P EST MOD 30 MIN: CPT | Performed by: NURSE PRACTITIONER

## 2023-10-24 RX ORDER — METOPROLOL SUCCINATE 25 MG/1
25 TABLET, EXTENDED RELEASE ORAL 2 TIMES DAILY
Qty: 60 TABLET | Refills: 11 | Status: SHIPPED | OUTPATIENT
Start: 2023-10-24 | End: 2023-10-26

## 2023-10-24 RX ORDER — MAGNESIUM OXIDE 400 MG/1
400 TABLET ORAL DAILY
Qty: 30 TABLET | Refills: 11 | Status: SHIPPED | OUTPATIENT
Start: 2023-10-24

## 2023-10-24 RX ORDER — DILTIAZEM HYDROCHLORIDE 240 MG/1
240 CAPSULE, COATED, EXTENDED RELEASE ORAL DAILY
Qty: 90 CAPSULE | Refills: 3 | Status: SHIPPED | OUTPATIENT
Start: 2023-10-24

## 2023-10-24 NOTE — PROGRESS NOTES
Cardiology Follow Up    Ora Gilmore  1945  [id number]  [facility] CARDIOVASC PHYS  [address]  [place] [address]  [phone]  [phone]    Beth Jang presents for follow up of SVT, HTN, HLD, and aortic atherosclerosis on vascular imaging. 1. SVT (supraventricular tachycardia)  Assessment & Plan:  History of SVT dating back to 2016. Presented to [address] [place] [address] 2/28/2023 with increasing frequency of SVT episodes. Pneumonia noted on CTA chest.   SVT at 216 bpm was noted on presentation with frequent episodes noted (s/p adenosine x 3 in the ER). WCT noted as well (thought to be SVT with aberrancy). Transferred to [facility] for EP evaluation and underwent AVNRT and AT ablation 3/6/2023 (Dr. Deepali oRse). Noted to have PAT post ablation. ECG 3/10/2023 showed sinus tachycardia at 106 bpm and metoprolol tartrate 25 mg BID was started. Continues Cardizem  mg daily. 14 day ZIO March 2023 showed 36 short runs of SVT, longest 12 beat and avg  bpm.  She followed up with Dr. Deepali Rose 4/18/2023 at which time no changes were made to her regimen and she was advised she could follow up PRN. She notes palpitations in the afternoon. Transition from metoprolol tartrate to metoprolol succinate 25 mg BID and monitor response. Add magnesium oxide 400 mg daily. Orders:  -     magnesium oxide (MAG-OX) 400 mg tablet; Take 1 tablet (400 mg total) by mouth daily  -     metoprolol succinate (TOPROL-XL) 25 mg 24 hr tablet; Take 1 tablet (25 mg total) by mouth 2 (two) times a day  -     diltiazem (CARDIZEM CD) 240 mg 24 hr capsule; Take 1 capsule (240 mg total) by mouth daily    2. Primary hypertension  Assessment & Plan:  Blood pressure is controlled  Continue diltiazem 240 mg daily, losartan 25 mg daily. Transition from metoprolol tartrate to metoprolol succinate 25 mg BID. 3. Mixed hyperlipidemia  Assessment & Plan:  Lipid panel 3/1/2023: C 104. T 96. H 42. L 43. Rosuvastatin 5 mg daily.    Goal LDL < 70.   Continue statin therapy and recheck lipid panel 3/2024.      4. ABBY (obstructive sleep apnea)  Assessment & Plan:  Continue use of CPAP      5. Type 2 diabetes mellitus without complication, without long-term current use of insulin ([facility])  Assessment & Plan:    Lab Results   Component Value Date    HGBA1C 6.3 (H) 02/28/2023     Managed by PCP. Reviewed importance of glucose control for cardiovascular protection. Goal LDL < 70.      6. Thyroid nodule  Assessment & Plan:  Managed by PCP      7. Aortic atherosclerosis ([address])  Assessment & Plan: Aortic atherosclerosis noted on xray and ultrasound imaging. Currently awaiting SOFIA's of the lower extremities due to complaint of exertional leg pain and cramping. Continue aspirin 81 mg daily and rosuvastatin 5 mg daily for goal LDL < 70. Consider vascular surgery consult pending upcoming imaging. ZEN Yung has a past medical history of SVT, diabetes, hypertension, hyperlipidemia and ABBY on CPAP. She was previously following with Dr. Natalie Samayoa at [address] [place] [address] Cardiology. She transitioned her care to [facility]iology in February 2023. Her history of SVT dates back to 2016 for which she was placed on metoprolol tartrate 25 mg BID. She underwent cardiac catheterization at that time which showed normal coronaries. She was admitted to [address] [place] [address] ER 2/28/2023 with increasing frequency of SVT/rapid heart rates. ECG on presentation showed SVT at 216 bpm. She was given adenosine x 3 in the ER for recurrent SVT. CT of chest with likely bilateral pneumonia. She was noted to have a WCT on presentation to the floor and transferred to the ICU. She was transferred to [facility] for EP evaluation. She ultimately underwent AVNRT and atrial tachycardia ablation (near tricuspid valve) on 3/6/2023 and was discharged home 3/8/2023 on diltiazem  mg daily. 2 week ZIO monitor was recommended.  She was given furosemide during admission and discharged home on furosemide 40 mg x 3 days. She followed up with Dr. Maame Chavez on 3/10/2023. She continued to note elevated heart rates and ECG at the visit showed sinus tachycardia with PACs at 106 bpm. She reported weakness. Metoprolol tartrate 25 mg BID was started and a 14 day ZIO monitor applied. ZIO showed 36 short runs of SVT, longest 12 beats with avg  bpm.    She followed up with Dr. Temitope Rowe with [facility] EP on 4/18/2023 at which time he reviewed her ZIO monitor and no further adjustments were made to her regimen. She was advised she could follow up with EP PRN. She reached out to our office on 7/12/2023 to report that her PCP had obtained a back xray which reported atherosclerosis on her aorta. An abdominal aortic ultrasound was ordered 8/30/2023 and showed atherosclerosis with no aneurysm. She was instructed to start daily 81 mg aspirin. She was already on statin therapy with LDL at 43. Given complaint of leg pain with activity bilateral LE SOFIA testing was ordered. 10/24/2023Huma Olivares presents for routine follow up. She states her palpitations are mostly controlled. She notices breakthrough symptoms as she is due for her afternoon dose of metoprolol. She states her heart rates are typically in the 70's to low 80's. She denies any HR's > 100 bpm or sustained rapid rates. No chest pain, shortness of breath or dyspnea on exertion. No lightheadedness or dizziness. No excessive fatigue. No edema. She is currently scheduled for her SOFIA testing in January. She does report cramping and pain in her lower legs with activity.     Medical Problems       Problem List       SVT (supraventricular tachycardia)    Dyspnea on exertion    ABBY (obstructive sleep apnea)    Class 1 obesity due to excess calories with serious comorbidity and body mass index (BMI) of 32.0 to 32.9 in adult    Thyroid nodule    Hypertension    Diabetes mellitus ([address])    Hyperlipidemia    Hematuria        Past Medical History:   Diagnosis Date    Diabetes mellitus ([address])     Hyperlipidemia     Hypertension     ABBY (obstructive sleep apnea)     on CPAP    SVT (supraventricular tachycardia)      Social History     Socioeconomic History    Marital status:      Spouse name: Not on file    Number of children: Not on file    Years of education: Not on file    Highest education level: Not on file   Occupational History    Not on file   Tobacco Use    Smoking status: Former     Years: 25.00     Types: Cigarettes     Quit date: 1989     Years since quittin.8    Smokeless tobacco: Never   Vaping Use    Vaping Use: Never used   Substance and Sexual Activity    Alcohol use: Not Currently     Comment: occasionally    Drug use: Never    Sexual activity: Not on file     Comment: defer   Other Topics Concern    Not on file   Social History Narrative    Not on file     Social Determinants of Health     Financial Resource Strain: Not on file   Food Insecurity: No Food Insecurity (3/1/2023)    Hunger Vital Sign     Worried About Running Out of Food in the Last Year: Never true     Ran Out of Food in the Last Year: Never true   Transportation Needs: No Transportation Needs (3/1/2023)    PRAPARE - Transportation     Lack of Transportation (Medical): No     Lack of Transportation (Non-Medical): No   Physical Activity: Not on file   Stress: Not on file   Social Connections: Not on file   Intimate Partner Violence: Not on file   Housing Stability: Low Risk  (3/1/2023)    Housing Stability Vital Sign     Unable to Pay for Housing in the Last Year: No     Number of Places Lived in the Last Year: 1     Unstable Housing in the Last Year: No      History reviewed. No pertinent family history. Past Surgical History:   Procedure Laterality Date    BREAST SURGERY      CARDIAC ELECTROPHYSIOLOGY PROCEDURE N/A 2023    Procedure: Cardiac eps/svt ablation;  Surgeon: Star Wilson MD;  Location: [facility];   Service: Cardiology     SECTION      NOSE SURGERY TONSILLECTOMY      TUBAL LIGATION         Current Outpatient Medications:     aspirin (ECOTRIN LOW STRENGTH) 81 mg EC tablet, Take 81 mg by mouth daily, Disp: , Rfl:     Cholecalciferol 125 MCG (5000 UT) capsule, Take 1 tablet by mouth daily, Disp: , Rfl:     diltiazem (CARDIZEM CD) 240 mg 24 hr capsule, Take 1 capsule (240 mg total) by mouth daily, Disp: 90 capsule, Rfl: 3    losartan (COZAAR) 25 mg tablet, Take 1 tablet (25 mg total) by mouth daily, Disp: , Rfl: 0    magnesium oxide (MAG-OX) 400 mg tablet, Take 1 tablet (400 mg total) by mouth daily, Disp: 30 tablet, Rfl: 11    Melatonin 5 MG CAPS, Take by mouth daily at bedtime, Disp: , Rfl:     metFORMIN (GLUCOPHAGE-XR) 500 mg 24 hr tablet, , Disp: , Rfl:     metoprolol succinate (TOPROL-XL) 25 mg 24 hr tablet, Take 1 tablet (25 mg total) by mouth 2 (two) times a day, Disp: 60 tablet, Rfl: 11    Multiple Vitamins-Minerals (CENTRUM SILVER PO), Take by mouth in the morning, Disp: , Rfl:     rosuvastatin (CRESTOR) 5 mg tablet, Take 5 mg by mouth daily Monday, Wednesday, Friday, Disp: , Rfl:   Allergies   Allergen Reactions    Lisinopril Cough     ace cough  ace cough    Ace-cough    Naproxen Palpitations    Pseudoephedrine Palpitations       Labs:     Chemistry        Component Value Date/Time    K 3.4 (L) 03/08/2023 0550     03/08/2023 0550    CO2 32 03/08/2023 0550    BUN 9 03/08/2023 0550    CREATININE 0.40 (L) 03/08/2023 0550        Component Value Date/Time    CALCIUM 9.3 03/08/2023 0550    ALKPHOS 57 03/01/2023 0513    AST 20 03/01/2023 0513    ALT 15 03/01/2023 0513        Lipid panel 3/1/2023: C 104. T 96. H 42. L 43. Cardiac imaging:  Abdominal aortic u/s 8/30/2023:  1. No evidence for abdominal aortic aneurysm. 2.  Atherosclerosis. ZIO 3/10-3/24/2023:  Predominantly sinus rhythm, HR , avg 79 bpm. 36 runs of SVT, longest 12 beats and fastest 187 bpm, avg 139 bpm. Rare PAC's. Rare PVC's. ECG 3/10/2023: Sinus tachycardia.  106 bpm. Low voltage QRS. Non specific ST T wave abnormality. Echocardiogram 3/1/2023:   EF 50%. Abnormal septal motion. Mild diastolic dysfunction. LAE.   MAC. Trace to mild MR. Mild TR. Mild pHTN with estimated RVSP 36 mmHg. Upper normal aortic root size. 3.6 cm. Review of Systems   Constitutional: Negative. HENT: Negative. Cardiovascular:  Positive for palpitations. Negative for chest pain, dyspnea on exertion, irregular heartbeat, leg swelling, near-syncope and orthopnea. Respiratory:  Negative for cough and snoring. Endocrine: Negative. Skin: Negative. Musculoskeletal:         Leg pain with exertion   Gastrointestinal: Negative. Genitourinary: Negative. Neurological: Negative. Psychiatric/Behavioral: Negative. Vitals:    10/24/23 1441   BP: 122/70   Pulse:    SpO2:      Vitals:    10/24/23 1417   Weight: 83.5 kg (184 lb)     Height: 5' 4" (162.6 cm)   Body mass index is 31.58 kg/m². Physical Exam  Vitals and nursing note reviewed. Constitutional:       General: She is not in acute distress. Appearance: She is well-developed. She is not diaphoretic. HENT:      Head: Normocephalic and atraumatic. Neck:      Thyroid: No thyromegaly. Vascular: No carotid bruit or JVD. Cardiovascular:      Rate and Rhythm: Normal rate and regular rhythm. Occasional Extrasystoles are present. Pulses: Intact distal pulses. Radial pulses are 2+ on the right side and 2+ on the left side. Dorsalis pedis pulses are 2+ on the right side and 2+ on the left side. Posterior tibial pulses are 2+ on the right side and 2+ on the left side. Heart sounds: Normal heart sounds, S1 normal and S2 normal. No murmur heard. Comments: No edema  Pulmonary:      Effort: Pulmonary effort is normal.      Breath sounds: Normal breath sounds. Abdominal:      General: There is no distension. Palpations: Abdomen is soft. Tenderness:  There is no abdominal tenderness. Musculoskeletal:         General: Normal range of motion. Cervical back: Normal range of motion and neck supple. Lymphadenopathy:      Cervical: No cervical adenopathy. Skin:     General: Skin is warm and dry. Neurological:      Mental Status: She is alert and oriented to person, place, and time. Cranial Nerves: No cranial nerve deficit.    Psychiatric:         Behavior: Behavior normal.

## 2023-10-24 NOTE — PATIENT INSTRUCTIONS
Add daily magnesium 400 mg, if you develop loose stools reduce the dose. Transition from metoprolol tartrate to metoprolol succinate 25 mg twice daily - you can start either this evening or tomorrow morning. Monitor heart rates and notify me if HR persistently < 50 or > 100. BP is perfect. We will watch for the results of your arterial study. Continue daily aspirin 81 mg and statin. Please have Mally Cramer send me a copy of your next blood work.

## 2023-10-24 NOTE — ASSESSMENT & PLAN NOTE
Aortic atherosclerosis noted on xray and ultrasound imaging. Currently awaiting SOFIA's of the lower extremities due to complaint of exertional leg pain and cramping. Continue aspirin 81 mg daily and rosuvastatin 5 mg daily for goal LDL < 70. Consider vascular surgery consult pending upcoming imaging.

## 2023-10-24 NOTE — ASSESSMENT & PLAN NOTE
Blood pressure is controlled  Continue diltiazem 240 mg daily, losartan 25 mg daily. Transition from metoprolol tartrate to metoprolol succinate 25 mg BID.

## 2023-10-24 NOTE — ASSESSMENT & PLAN NOTE
Lab Results   Component Value Date    HGBA1C 6.3 (H) 02/28/2023     Managed by PCP. Reviewed importance of glucose control for cardiovascular protection. Goal LDL < 70.

## 2023-10-24 NOTE — ASSESSMENT & PLAN NOTE
Lipid panel 3/1/2023: C 104. T 96. H 42. L 43. Rosuvastatin 5 mg daily. Goal LDL < 70. Continue statin therapy and recheck lipid panel 3/2024.

## 2023-10-24 NOTE — ASSESSMENT & PLAN NOTE
History of SVT dating back to 2016. Presented to [address] [place] [address] Marlene 2/28/2023 with increasing frequency of SVT episodes. Pneumonia noted on CTA chest.   SVT at 216 bpm was noted on presentation with frequent episodes noted (s/p adenosine x 3 in the ER). WCT noted as well (thought to be SVT with aberrancy). Transferred to [facility] for EP evaluation and underwent AVNRT and AT ablation 3/6/2023 (Dr. Corrie Ashby). Noted to have PAT post ablation. ECG 3/10/2023 showed sinus tachycardia at 106 bpm and metoprolol tartrate 25 mg BID was started. Continues Cardizem  mg daily. 14 day ZIO March 2023 showed 36 short runs of SVT, longest 12 beat and avg  bpm.  She followed up with Dr. Corrie Ashby 4/18/2023 at which time no changes were made to her regimen and she was advised she could follow up PRN. She notes palpitations in the afternoon. Transition from metoprolol tartrate to metoprolol succinate 25 mg BID and monitor response. Add magnesium oxide 400 mg daily.

## 2023-10-26 DIAGNOSIS — I47.10 SVT (SUPRAVENTRICULAR TACHYCARDIA): ICD-10-CM

## 2023-10-26 RX ORDER — METOPROLOL SUCCINATE 25 MG/1
25 TABLET, EXTENDED RELEASE ORAL 2 TIMES DAILY
Qty: 180 TABLET | Refills: 3 | Status: SHIPPED | OUTPATIENT
Start: 2023-10-26

## 2023-10-30 ENCOUNTER — TELEPHONE (OUTPATIENT)
Dept: CARDIOLOGY CLINIC | Facility: CLINIC | Age: 78
End: 2023-10-30

## 2023-10-30 NOTE — TELEPHONE ENCOUNTER
Pt states that she has been getting the same HR that she has been getting even with the metoprolol. States that this AM about 4 hours after taking her med's, her HR was 95. States that she has been getting more 90s then 80s. States that if we call tomorrow we can leave a message as she will be on a bus all day.

## 2023-10-30 NOTE — TELEPHONE ENCOUNTER
Pt elias stating that she started metoprolol on Wed and her hrt rate is still in the 90's.      Please call pt [phone]

## 2023-10-31 NOTE — TELEPHONE ENCOUNTER
Reviewed Demi's last note. Metoprolol raised for SVT episodes. If she's not having palpitations, no worries about the heart rate being in the 80s 90s. If she does have continued palpitations, please have her monitor her blood pressures as well so that we can gauge how much more we may be able to raise metoprolol. Thank you.

## 2023-10-31 NOTE — TELEPHONE ENCOUNTER
Pt is aware and understanding. States that she does not have palps at this time. States that she will let us know if she feels anything different.

## 2023-11-02 NOTE — TELEPHONE ENCOUNTER
Pt called and would like to speak to someone. She is taking magnesium and it is killing her stomach and she is taking it with food. Please advise.  [phone]

## 2023-11-02 NOTE — TELEPHONE ENCOUNTER
Can you let her know Theresa Wilkins is out. She can touch base w/ PCP about Mg supplements. If she stops, we just need to check levels in a couple weeks.

## 2023-11-06 ENCOUNTER — HOSPITAL ENCOUNTER (OUTPATIENT)
Dept: NON INVASIVE DIAGNOSTICS | Facility: HOSPITAL | Age: 78
Discharge: HOME | End: 2023-11-06
Payer: COMMERCIAL

## 2023-11-06 DIAGNOSIS — I73.9 CLAUDICATION (HCC): ICD-10-CM

## 2023-11-06 DIAGNOSIS — I70.0 AORTIC ATHEROSCLEROSIS (HCC): ICD-10-CM

## 2023-11-06 PROCEDURE — 93924 LWR XTR VASC STDY BILAT: CPT | Performed by: SURGERY

## 2023-11-06 PROCEDURE — 93924 LWR XTR VASC STDY BILAT: CPT

## 2024-04-01 NOTE — PROGRESS NOTES
Cardiology Follow Up    Raven Valdovinos  1945  [phone]  [facility] [place]  [address]  [address]  [facility] [address] [phone]    Anastasia presents for follow up of SVT, HTN, HLD, and aortic atherosclerosis on vascular imaging.      1. SVT (supraventricular tachycardia)  Assessment & Plan:  History of SVT dating back to 2016.   Presented to [facility] 2/28/2023 with increasing frequency of SVT episodes.   Pneumonia noted on CTA chest.   SVT at 216 bpm was noted on presentation with frequent episodes noted (s/p adenosine x 3 in the ER).  WCT noted as well (thought to be SVT with aberrancy).   Transferred to [facility] for EP evaluation and underwent AVNRT and AT ablation 3/6/2023 (Dr. Echols).   Noted to have PAT post ablation.  ECG 3/10/2023 showed sinus tachycardia at 106 bpm and metoprolol tartrate 25 mg BID was started.   Continues Cardizem  mg daily.  14 day ZIO March 2023 showed 36 short runs of SVT, longest 12 beat and avg  bpm.  She followed up with Dr. Echols 4/18/2023 at which time no changes were made to her regimen and she was advised she could follow up PRN.  Due to breakthrough palpitations metoprolol was transitioned to succinate 25 mg BID which is controlling symptoms very well.  Continue current regimen.  Did not tolerate PO magnesium due to GI upset.    Orders:  -     Echo complete w/ contrast if indicated; Future; Expected date: 04/02/2024    2. Primary hypertension  Assessment & Plan:  Blood pressure is controlled  Continue diltiazem 240 mg daily, losartan 25 mg daily, and metoprolol succinate 25 mg BID.  Recent lab work reviewed.  Discussed benefits of 2 g sodium diet, regular exercise, weight control.    Orders:  -     Echo complete w/ contrast if indicated; Future; Expected date: 04/02/2024    3. Mixed hyperlipidemia  Assessment & Plan:  Lipid panel 3/21/2024: C 160. T 108. H 67. L 71.  Rosuvastatin 5 mg daily.   Goal  LDL < 70.   Continue current regimen.  Repeat lipid panel spring 2025.        4. Aortic atherosclerosis (HCC)  Assessment & Plan:  Aortic atherosclerosis noted on xray and ultrasound imaging.  Bilateral SOFIA with exercise 11/6/2023 shows normal findings.  Continue aspirin 81 mg daily and rosuvastatin 5 mg daily for goal LDL < 70.  We reviewed s/s to report.      5. Type 2 diabetes mellitus without complication, without long-term current use of insulin (HCC)  Assessment & Plan:  Ha1c 6.2 on 3/21/2024  Managed by PCP. Reviewed importance of glucose control for cardiovascular protection.  Goal LDL < 70.      6. Thyroid nodule  Assessment & Plan:  Managed by PCP      7. ABBY (obstructive sleep apnea)  Assessment & Plan:  Continue use of CPAP. Reviewed importance of faithful use today.      8. Class 1 obesity due to excess calories with serious comorbidity and body mass index (BMI) of 30.0 to 30.9 in adult  Assessment & Plan:  Body mass index is 30.69 kg/m².  Working toward weight loss. I applauded efforts.         ZEN Oconnor has a past medical history of SVT, diabetes, hypertension, hyperlipidemia and ABBY on CPAP.      She was previously following with Dr. Mcintosh at [facility]. She transitioned her care to [facility] in February 2023. Her history of SVT dates back to 2016 for which she was placed on metoprolol tartrate 25 mg BID. She underwent cardiac catheterization at that time which showed normal coronaries.      She was admitted to [facility] ER 2/28/2023 with increasing frequency of SVT/rapid heart rates. ECG on presentation showed SVT at 216 bpm. She was given adenosine x 3 in the ER for recurrent SVT. CT of chest with likely bilateral pneumonia. She was noted to have a WCT on presentation to the floor and transferred to the ICU. She was transferred to \[facility]\"" for EP evaluation. She ultimately underwent AVNRT and atrial tachycardia ablation (near tricuspid valve) on 3/6/2023 and was discharged home  3/8/2023 on diltiazem  mg daily. 2 week ZIO monitor was recommended. She was given furosemide during admission and discharged home on furosemide 40 mg x 3 days.      She followed up with Dr. Quezada on 3/10/2023. She continued to note elevated heart rates and ECG at the visit showed sinus tachycardia with PACs at 106 bpm. She reported weakness. Metoprolol tartrate 25 mg BID was started and a 14 day ZIO monitor applied. ZIO showed 36 short runs of SVT, longest 12 beats with avg  bpm.     She followed up with Dr. Echols with [facility] EP on 4/18/2023 at which time he reviewed her ZIO monitor and no further adjustments were made to her regimen. She was advised she could follow up with EP PRN.      She reached out to our office on 7/12/2023 to report that her PCP had obtained a back xray which reported atherosclerosis on her aorta. An abdominal aortic ultrasound was ordered 8/30/2023 and showed atherosclerosis with no aneurysm. She was instructed to start daily 81 mg aspirin. She was already on statin therapy with LDL at 43. Given complaint of leg pain with activity bilateral LE SOFIA testing was ordered.     She followed up most recently 10/24/2023 at which time her LEAD testing was scheduled for January 2024. She noticed breakthrough palpitations around the time she was due for her second dose of metoprolol in the afternoon. She was transitioned from metoprolol tartrate to metoprolol succinate 25 mg twice daily.     Bilateral lower extremity SOFIA's with exercise 11/6/2023 showed no evidence of PAD.    4/2/2024: Anastasia presents for routine follow up. She completed blood work for the PCP on 3/21/2024 and brings a copy of the results for my review. Ha1c is improved from 6.8 to 6.2. LDL 71. . She states she is making efforts with her diet for weight loss. She reports excellent control of BP and HR's at home. Since changing to metoprolol succinate her heart rates have been well controlled. She reports  only one episode of fast heart rate, lasting about 10 minutes, when her neighbor in her apartment building was angry and yelling. NO recurrent episodes. No chest pain, shortness of breath, edema. She is tolerating her medications without issue. No bleeding issues.    Medical Problems       Problem List       Dyspnea on exertion    Class 1 obesity due to excess calories with serious comorbidity and body mass index (BMI) of 32.0 to 32.9 in adult    Hematuria    SVT (supraventricular tachycardia)    Hypertension    Hyperlipidemia    ABBY (obstructive sleep apnea)    Aortic atherosclerosis (HCC)    Thyroid nodule    Diabetes mellitus (HCC)        Past Medical History:   Diagnosis Date    Diabetes mellitus (HCC)     Hyperlipidemia     Hypertension     ABBY (obstructive sleep apnea)     on CPAP    SVT (supraventricular tachycardia)      Social History     Socioeconomic History    Marital status:      Spouse name: Not on file    Number of children: Not on file    Years of education: Not on file    Highest education level: Not on file   Occupational History    Not on file   Tobacco Use    Smoking status: Former     Current packs/day: 0.00     Types: Cigarettes     Start date: 1964     Quit date: 1989     Years since quittin.2    Smokeless tobacco: Never   Vaping Use    Vaping status: Never Used   Substance and Sexual Activity    Alcohol use: Not Currently     Comment: occasionally    Drug use: Never    Sexual activity: Not on file     Comment: defer   Other Topics Concern    Not on file   Social History Narrative    Not on file     Social Determinants of Health     Financial Resource Strain: Not on file   Food Insecurity: No Food Insecurity (3/1/2023)    Hunger Vital Sign     Worried About Running Out of Food in the Last Year: Never true     Ran Out of Food in the Last Year: Never true   Transportation Needs: No Transportation Needs (3/1/2023)    PRAPARE - Transportation     Lack of Transportation  (Medical): No     Lack of Transportation (Non-Medical): No   Physical Activity: Not on file   Stress: Not on file   Social Connections: Not on file   Intimate Partner Violence: Not on file   Housing Stability: Low Risk  (3/1/2023)    Housing Stability Vital Sign     Unable to Pay for Housing in the Last Year: No     Number of Places Lived in the Last Year: 1     Unstable Housing in the Last Year: No      History reviewed. No pertinent family history.  Past Surgical History:   Procedure Laterality Date    BREAST SURGERY      CARDIAC ELECTROPHYSIOLOGY PROCEDURE N/A 2023    Procedure: Cardiac eps/svt ablation;  Surgeon: Dallas Echols MD;  Location: [facility];  Service: Cardiology     SECTION      NOSE SURGERY      TONSILLECTOMY      TUBAL LIGATION         Current Outpatient Medications:     aspirin (ECOTRIN LOW STRENGTH) 81 mg EC tablet, Take 81 mg by mouth daily, Disp: , Rfl:     Cholecalciferol 125 MCG (5000 UT) capsule, Take 1 tablet by mouth daily 1000 units, Disp: , Rfl:     diltiazem (CARDIZEM CD) 240 mg 24 hr capsule, Take 1 capsule (240 mg total) by mouth daily, Disp: 90 capsule, Rfl: 3    losartan (COZAAR) 25 mg tablet, Take 1 tablet (25 mg total) by mouth daily, Disp: , Rfl: 0    Melatonin 5 MG CAPS, Take by mouth daily at bedtime 3 mg, Disp: , Rfl:     metFORMIN (GLUCOPHAGE-XR) 500 mg 24 hr tablet, , Disp: , Rfl:     metoprolol succinate (TOPROL-XL) 25 mg 24 hr tablet, take 1 tablet by mouth twice a day, Disp: 180 tablet, Rfl: 3    Multiple Vitamins-Minerals (CENTRUM SILVER PO), Take by mouth in the morning, Disp: , Rfl:     rosuvastatin (CRESTOR) 5 mg tablet, Take 5 mg by mouth daily Monday, Wednesday, Friday, Disp: , Rfl:   Allergies   Allergen Reactions    Lisinopril Cough     ace cough  ace cough    Ace-cough    Naproxen Palpitations    Pseudoephedrine Palpitations       Labs:     Chemistry        Component Value Date/Time    K 3.4 (L) 2023 0550     2023 0550     CO2 32 03/08/2023 0550    BUN 9 03/08/2023 0550    CREATININE 0.40 (L) 03/08/2023 0550        Component Value Date/Time    CALCIUM 9.3 03/08/2023 0550    ALKPHOS 57 03/01/2023 0513    AST 20 03/01/2023 0513    ALT 15 03/01/2023 0513        Lipid panel 3/1/2023: C 104. T 96. H 42. L 43.  Lipid panel 3/21/2024: C 160. T 108. H 67. L 71.     Cardiac imaging:  BLE SOFIA with exercise 11/6/2023:  RIGHT LOWER LIMB PRE EXERCISE  Ankle/Brachial index:  1.20, normal.  Metatarsal pressure of 167 mmHg. Great toe pressure of 115 mmHg, within healing  range.  PVR/ PPG tracings are normal.     LEFT LOWER LIMB PRE EXERCISE:  Ankle/Brachial index: 1.18, normal.  Metatarsal pressure of 102 mmHg. Great toe pressure of 70 mmHg, within healing  range.  PVR/ PPG tracings are normal.     POST EXERCISE ANKLE/BRACHIAL INDEX:  After  3 minutes of toe raises, there is no significant change in the SOFIA.    Abdominal aortic u/s 8/30/2023:  1.  No evidence for abdominal aortic aneurysm.  2.  Atherosclerosis.     ZIO 3/10-3/24/2023:  Predominantly sinus rhythm, HR , avg 79 bpm. 36 runs of SVT, longest 12 beats and fastest 187 bpm, avg 139 bpm. Rare PAC's. Rare PVC's.      ECG 3/10/2023: Sinus tachycardia. 106 bpm. Low voltage QRS. Non specific ST T wave abnormality.      Echocardiogram 3/1/2023:   EF 50%. Abnormal septal motion.   Mild diastolic dysfunction.   LAE.   MAC. Trace to mild MR.  Mild TR.   Mild pHTN with estimated RVSP 36 mmHg.   Upper normal aortic root size. 3.6 cm.     Review of Systems   Constitutional: Negative.   HENT: Negative.     Cardiovascular:  Negative for chest pain, dyspnea on exertion, irregular heartbeat, leg swelling, near-syncope, orthopnea and palpitations.   Respiratory:  Negative for cough and snoring.    Endocrine: Negative.    Skin: Negative.    Musculoskeletal: Negative.    Gastrointestinal: Negative.    Genitourinary: Negative.    Neurological: Negative.    Psychiatric/Behavioral: Negative.    "      Vitals:    04/02/24 1512   BP: 118/70   Pulse:    Temp:    SpO2:      Vitals:    04/02/24 1448   Weight: 81.1 kg (178 lb 12.8 oz)     Height: 5' 4\" (162.6 cm)   Body mass index is 30.69 kg/m².    Physical Exam  Vitals and nursing note reviewed.   Constitutional:       General: She is not in acute distress.     Appearance: She is well-developed. She is obese. She is not diaphoretic.   HENT:      Head: Normocephalic and atraumatic.   Neck:      Thyroid: No thyromegaly.      Vascular: No carotid bruit or JVD.   Cardiovascular:      Rate and Rhythm: Normal rate and regular rhythm. No extrasystoles are present.     Pulses: Intact distal pulses.           Radial pulses are 2+ on the right side and 2+ on the left side.      Heart sounds: Normal heart sounds, S1 normal and S2 normal. No murmur heard.     Comments: No edema  Pulmonary:      Effort: Pulmonary effort is normal.      Breath sounds: Normal breath sounds.   Abdominal:      General: There is no distension.      Palpations: Abdomen is soft.      Tenderness: There is no abdominal tenderness.   Musculoskeletal:         General: Normal range of motion.      Cervical back: Normal range of motion and neck supple.   Lymphadenopathy:      Cervical: No cervical adenopathy.   Skin:     General: Skin is warm and dry.   Neurological:      Mental Status: She is alert and oriented to person, place, and time.      Cranial Nerves: No cranial nerve deficit.   Psychiatric:         Behavior: Behavior normal.                "

## 2024-04-02 ENCOUNTER — OFFICE VISIT (OUTPATIENT)
Dept: CARDIOLOGY CLINIC | Facility: CLINIC | Age: 79
End: 2024-04-02
Payer: COMMERCIAL

## 2024-04-02 VITALS
HEART RATE: 70 BPM | TEMPERATURE: 97.8 F | SYSTOLIC BLOOD PRESSURE: 118 MMHG | BODY MASS INDEX: 30.52 KG/M2 | OXYGEN SATURATION: 97 % | HEIGHT: 64 IN | DIASTOLIC BLOOD PRESSURE: 70 MMHG | WEIGHT: 178.8 LBS

## 2024-04-02 DIAGNOSIS — E78.2 MIXED HYPERLIPIDEMIA: ICD-10-CM

## 2024-04-02 DIAGNOSIS — I47.10 SVT (SUPRAVENTRICULAR TACHYCARDIA): Primary | ICD-10-CM

## 2024-04-02 DIAGNOSIS — G47.33 OSA (OBSTRUCTIVE SLEEP APNEA): ICD-10-CM

## 2024-04-02 DIAGNOSIS — I70.0 AORTIC ATHEROSCLEROSIS (HCC): ICD-10-CM

## 2024-04-02 DIAGNOSIS — E11.9 TYPE 2 DIABETES MELLITUS WITHOUT COMPLICATION, WITHOUT LONG-TERM CURRENT USE OF INSULIN (HCC): ICD-10-CM

## 2024-04-02 DIAGNOSIS — E04.1 THYROID NODULE: ICD-10-CM

## 2024-04-02 DIAGNOSIS — E66.09 CLASS 1 OBESITY DUE TO EXCESS CALORIES WITH SERIOUS COMORBIDITY AND BODY MASS INDEX (BMI) OF 30.0 TO 30.9 IN ADULT: ICD-10-CM

## 2024-04-02 DIAGNOSIS — I10 PRIMARY HYPERTENSION: ICD-10-CM

## 2024-04-02 PROCEDURE — G2211 COMPLEX E/M VISIT ADD ON: HCPCS | Performed by: NURSE PRACTITIONER

## 2024-04-02 PROCEDURE — 99214 OFFICE O/P EST MOD 30 MIN: CPT | Performed by: NURSE PRACTITIONER

## 2024-04-02 NOTE — ASSESSMENT & PLAN NOTE
Blood pressure is controlled  Continue diltiazem 240 mg daily, losartan 25 mg daily, and metoprolol succinate 25 mg BID.  Recent lab work reviewed.  Discussed benefits of 2 g sodium diet, regular exercise, weight control.

## 2024-04-02 NOTE — ASSESSMENT & PLAN NOTE
Lipid panel 3/21/2024: C 160. T 108. H 67. L 71.  Rosuvastatin 5 mg daily.   Goal LDL < 70.   Continue current regimen.  Repeat lipid panel spring 2025.

## 2024-04-02 NOTE — ASSESSMENT & PLAN NOTE
Aortic atherosclerosis noted on xray and ultrasound imaging.  Bilateral SOFIA with exercise 11/6/2023 shows normal findings.  Continue aspirin 81 mg daily and rosuvastatin 5 mg daily for goal LDL < 70.  We reviewed s/s to report.

## 2024-04-02 NOTE — ASSESSMENT & PLAN NOTE
History of SVT dating back to 2016.   Presented to [facility] 2/28/2023 with increasing frequency of SVT episodes.   Pneumonia noted on CTA chest.   SVT at 216 bpm was noted on presentation with frequent episodes noted (s/p adenosine x 3 in the ER).  WCT noted as well (thought to be SVT with aberrancy).   Transferred to [facility] for EP evaluation and underwent AVNRT and AT ablation 3/6/2023 (Dr. Echols).   Noted to have PAT post ablation.  ECG 3/10/2023 showed sinus tachycardia at 106 bpm and metoprolol tartrate 25 mg BID was started.   Continues Cardizem  mg daily.  14 day ZIO March 2023 showed 36 short runs of SVT, longest 12 beat and avg  bpm.  She followed up with Dr. Echols 4/18/2023 at which time no changes were made to her regimen and she was advised she could follow up PRN.  Due to breakthrough palpitations metoprolol was transitioned to succinate 25 mg BID which is controlling symptoms very well.  Continue current regimen.  Did not tolerate PO magnesium due to GI upset.

## 2024-04-02 NOTE — ASSESSMENT & PLAN NOTE
Ha1c 6.2 on 3/21/2024  Managed by PCP. Reviewed importance of glucose control for cardiovascular protection.  Goal LDL < 70.

## 2024-04-02 NOTE — PATIENT INSTRUCTIONS
BP is excellent on my check.  Cholesterol is at goal.  Plan updated echocardiogram to recheck heart function.  Arterial imaging in 1 year's time.  No change to medication at this time.

## 2024-04-10 ENCOUNTER — HOSPITAL ENCOUNTER (OUTPATIENT)
Dept: NON INVASIVE DIAGNOSTICS | Facility: HOSPITAL | Age: 79
Discharge: HOME | End: 2024-04-10
Payer: COMMERCIAL

## 2024-04-10 VITALS
WEIGHT: 178 LBS | SYSTOLIC BLOOD PRESSURE: 118 MMHG | HEART RATE: 70 BPM | BODY MASS INDEX: 30.39 KG/M2 | DIASTOLIC BLOOD PRESSURE: 70 MMHG | HEIGHT: 64 IN

## 2024-04-10 DIAGNOSIS — I10 PRIMARY HYPERTENSION: ICD-10-CM

## 2024-04-10 DIAGNOSIS — I47.10 SVT (SUPRAVENTRICULAR TACHYCARDIA): ICD-10-CM

## 2024-04-10 LAB
AORTIC ROOT: 3.3 CM
APICAL FOUR CHAMBER EJECTION FRACTION: 60 %
AV REGURGITATION PRESSURE HALF TIME: 643 MS
BSA FOR ECHO PROCEDURE: 1.86 M2
E WAVE DECELERATION TIME: 292 MS
E/A RATIO: 0.67
FRACTIONAL SHORTENING: 36 (ref 28–44)
INTERVENTRICULAR SEPTUM IN DIASTOLE (PARASTERNAL SHORT AXIS VIEW): 1 CM
INTERVENTRICULAR SEPTUM: 1 CM (ref 0.6–1.1)
LAAS-AP2: 14.2 CM2
LAAS-AP4: 16.5 CM2
LEFT ATRIUM SIZE: 3.4 CM
LEFT ATRIUM VOLUME (MOD BIPLANE): 38 ML
LEFT ATRIUM VOLUME INDEX (MOD BIPLANE): 20.3 ML/M2
LEFT INTERNAL DIMENSION IN SYSTOLE: 2.8 CM (ref 2.1–4)
LEFT VENTRICLE DIASTOLIC VOLUME (MOD BIPLANE): 66 ML
LEFT VENTRICLE DIASTOLIC VOLUME INDEX (MOD BIPLANE): 35.5 ML/M2
LEFT VENTRICLE SYSTOLIC VOLUME (MOD BIPLANE): 22 ML
LEFT VENTRICLE SYSTOLIC VOLUME INDEX (MOD BIPLANE): 11.8 ML/M2
LEFT VENTRICULAR INTERNAL DIMENSION IN DIASTOLE: 4.4 CM (ref 3.5–6)
LEFT VENTRICULAR POSTERIOR WALL IN END DIASTOLE: 1 CM
LEFT VENTRICULAR STROKE VOLUME: 57 ML
LV EF: 66 %
LVSV (TEICH): 57 ML
MV E'TISSUE VEL-LAT: 12 CM/S
MV E'TISSUE VEL-SEP: 7 CM/S
MV PEAK A VEL: 0.86 M/S
MV PEAK E VEL: 58 CM/S
MV STENOSIS PRESSURE HALF TIME: 85 MS
MV VALVE AREA P 1/2 METHOD: 2.59
RIGHT ATRIUM AREA SYSTOLE A4C: 12.1 CM2
RIGHT VENTRICLE ID DIMENSION: 3.7 CM
SL CV AV DECELERATION TIME RETROGRADE: 2217 MS
SL CV AV PEAK GRADIENT RETROGRADE: 28 MMHG
SL CV LEFT ATRIUM LENGTH A2C: 4.7 CM
SL CV PED ECHO LEFT VENTRICLE DIASTOLIC VOLUME (MOD BIPLANE) 2D: 87 ML
SL CV PED ECHO LEFT VENTRICLE SYSTOLIC VOLUME (MOD BIPLANE) 2D: 31 ML
TR MAX PG: 23 MMHG
TR PEAK VELOCITY: 2.4 M/S
TRICUSPID ANNULAR PLANE SYSTOLIC EXCURSION: 2 CM
TRICUSPID VALVE PEAK REGURGITATION VELOCITY: 2.38 M/S

## 2024-04-10 PROCEDURE — 93306 TTE W/DOPPLER COMPLETE: CPT | Performed by: INTERNAL MEDICINE

## 2024-04-10 PROCEDURE — 93306 TTE W/DOPPLER COMPLETE: CPT

## 2024-09-30 ENCOUNTER — TELEPHONE (OUTPATIENT)
Dept: CARDIOLOGY CLINIC | Facility: CLINIC | Age: 79
End: 2024-09-30

## 2024-09-30 NOTE — TELEPHONE ENCOUNTER
Pt called and would like to know if she can take Claritin for a little while. Her allergies are bothering her.  Please call [phone]

## 2024-10-14 ENCOUNTER — OFFICE VISIT (OUTPATIENT)
Dept: CARDIOLOGY CLINIC | Facility: CLINIC | Age: 79
End: 2024-10-14
Payer: COMMERCIAL

## 2024-10-14 VITALS
HEART RATE: 72 BPM | SYSTOLIC BLOOD PRESSURE: 118 MMHG | HEIGHT: 64 IN | BODY MASS INDEX: 30.73 KG/M2 | WEIGHT: 180 LBS | OXYGEN SATURATION: 96 % | TEMPERATURE: 92.4 F | DIASTOLIC BLOOD PRESSURE: 60 MMHG

## 2024-10-14 DIAGNOSIS — E66.09 CLASS 1 OBESITY DUE TO EXCESS CALORIES WITH SERIOUS COMORBIDITY AND BODY MASS INDEX (BMI) OF 30.0 TO 30.9 IN ADULT: ICD-10-CM

## 2024-10-14 DIAGNOSIS — R06.09 DYSPNEA ON EXERTION: ICD-10-CM

## 2024-10-14 DIAGNOSIS — I47.10 SVT (SUPRAVENTRICULAR TACHYCARDIA) (HCC): Primary | ICD-10-CM

## 2024-10-14 DIAGNOSIS — I70.0 AORTIC ATHEROSCLEROSIS (HCC): ICD-10-CM

## 2024-10-14 DIAGNOSIS — E66.811 CLASS 1 OBESITY DUE TO EXCESS CALORIES WITH SERIOUS COMORBIDITY AND BODY MASS INDEX (BMI) OF 30.0 TO 30.9 IN ADULT: ICD-10-CM

## 2024-10-14 DIAGNOSIS — G47.33 OSA (OBSTRUCTIVE SLEEP APNEA): ICD-10-CM

## 2024-10-14 DIAGNOSIS — I10 PRIMARY HYPERTENSION: ICD-10-CM

## 2024-10-14 DIAGNOSIS — E78.2 MIXED HYPERLIPIDEMIA: ICD-10-CM

## 2024-10-14 PROCEDURE — 93000 ELECTROCARDIOGRAM COMPLETE: CPT | Performed by: NURSE PRACTITIONER

## 2024-10-14 PROCEDURE — 99214 OFFICE O/P EST MOD 30 MIN: CPT | Performed by: NURSE PRACTITIONER

## 2024-10-14 RX ORDER — ALENDRONATE SODIUM 35 MG/1
TABLET ORAL
COMMUNITY
Start: 2024-08-27

## 2024-10-14 NOTE — PROGRESS NOTES
Cardiology Follow Up    Raven Valdovinos  1945  [id number]  [facility] [place] [id number]  [address]LO[address]  [facility] [id number] [phone]    Anastasia presents for follow up of SVT, HTN, HLD, and aortic atherosclerosis on vascular imaging.      1. SVT (supraventricular tachycardia) (HCC)  Assessment & Plan:  History of SVT dating back to 2016.   Presented to [facility] 2/28/2023 with increasing frequency of SVT episodes.   Pneumonia noted on CTA chest.   SVT at 216 bpm was noted on presentation with frequent episodes noted (s/p adenosine x 3 in the ER).  WCT noted as well (thought to be SVT with aberrancy).   Transferred to [facility] for EP evaluation and underwent AVNRT and AT ablation 3/6/2023 (Dr. Echols).   Noted to have PAT post ablation.  ECG 3/10/2023 showed sinus tachycardia at 106 bpm and metoprolol tartrate 25 mg BID was started.   Continues Cardizem  mg daily.  14 day ZIO March 2023 showed 36 short runs of SVT, longest 12 beat and avg  bpm.  She followed up with Dr. Echols 4/18/2023 at which time no changes were made to her regimen and she was advised she could follow up PRN.  Due to breakthrough palpitations metoprolol was transitioned to succinate 25 mg BID which is controlling symptoms very well.  Will obtain updated 1 week ZIO to assess rates/rhythm.  For now continue current medications.  Did not tolerate PO magnesium due to GI upset.  Orders:  -     AMB extended holter monitor; Future; Expected date: 10/14/2024  -     POCT ECG  2. Primary hypertension  Assessment & Plan:  Blood pressure is excellent today.  Continue diltiazem 240 mg daily, losartan 25 mg daily, and metoprolol succinate 25 mg BID.  I requested a copy of her upcoming labs through the PCP  She will monitor for and report if hypotensive with SBP < 110.  Discussed benefits of 2 g sodium diet, regular exercise, weight control.  3. Mixed hyperlipidemia  Assessment &  Plan:  Lipid panel 3/21/2024: C 160. T 108. H 67. L 71.  Rosuvastatin 5 mg daily.   Goal LDL < 70.   Continue current regimen.  Repeat lipid panel spring 2025.    4. Aortic atherosclerosis (HCC)  Assessment & Plan:  Aortic atherosclerosis noted on xray and ultrasound imaging.  Bilateral SOFIA with exercise 11/6/2023 shows normal findings.  Continue aspirin 81 mg daily and rosuvastatin 5 mg daily for goal LDL < 70.  We reviewed s/s to report.  5. ABBY (obstructive sleep apnea)  Assessment & Plan:  Continue use of CPAP. Reviewed importance of faithful use today.  6. Dyspnea on exertion  Assessment & Plan:  Echo 4/2024 shows LVEF 50% with no significant change from prior.  No volume overload on exam.  Will obtain updated stress test for ischemic evaluation.  1 week ZIO to monitor HR's  Orders:  -     NM myocardial perfusion spect (rx stress and/or rest); Future; Expected date: 10/14/2024  7. Class 1 obesity due to excess calories with serious comorbidity and body mass index (BMI) of 30.0 to 30.9 in adult  Assessment & Plan:  Body mass index is 30.9 kg/m².  Working toward weight loss. I applauded efforts.     ZEN Oconnor has a past medical history of SVT, diabetes, hypertension, hyperlipidemia and ABBY on CPAP.      She was previously following with Dr. Mcintosh at [facility]. She transitioned her care to [facility] in February 2023. Her history of SVT dates back to 2016 for which she was placed on metoprolol tartrate 25 mg BID. She underwent cardiac catheterization at that time which showed normal coronaries.      She was admitted to [facility] 2/28/2023 with increasing frequency of SVT/rapid heart rates. ECG on presentation showed SVT at 216 bpm. She was given adenosine x 3 in the ER for recurrent SVT. CT of chest with likely bilateral pneumonia. She was noted to have a WCT on presentation to the floor and transferred to the ICU. She was transferred to [facility] for EP evaluation. She ultimately underwent  AVNRT and atrial tachycardia ablation (near tricuspid valve) on 3/6/2023 and was discharged home 3/8/2023 on diltiazem  mg daily. 2 week ZIO monitor was recommended. She was given furosemide during admission and discharged home on furosemide 40 mg x 3 days.      She followed up with Dr. Quezada on 3/10/2023. She continued to note elevated heart rates and ECG at the visit showed sinus tachycardia with PACs at 106 bpm. She reported weakness. Metoprolol tartrate 25 mg BID was started and a 14 day ZIO monitor applied. ZIO showed 36 short runs of SVT, longest 12 beats with avg  bpm.     She followed up with Dr. Echols with [facility] on 4/18/2023 at which time he reviewed her ZIO monitor and no further adjustments were made to her regimen. She was advised she could follow up with EP PRN.      She reached out to our office on 7/12/2023 to report that her PCP had obtained a back xray which reported atherosclerosis on her aorta. An abdominal aortic ultrasound was ordered 8/30/2023 and showed atherosclerosis with no aneurysm. She was instructed to start daily 81 mg aspirin. She was already on statin therapy with LDL at 43. Given complaint of leg pain with activity bilateral LE SOFIA testing was ordered.      She followed up most recently 10/24/2023 at which time her LEAD testing was scheduled for January 2024. She noticed breakthrough palpitations around the time she was due for her second dose of metoprolol in the afternoon. She was transitioned from metoprolol tartrate to metoprolol succinate 25 mg twice daily.      Bilateral lower extremity SOFIA's with exercise 11/6/2023 showed no evidence of PAD.    10/14/2024: Anastasia presents for routine follow up. ECG today shows SR at 78 bpm. She completed an updated echo on 4/10/2024 which showed LVEF 50% with no significant change from prior. She states her heart rates have been well controlled. She reports stable dyspnea on exertion. Occasional edema in her right  foot only. She reports fatigue, which is bothersome. No syncope/near syncope or overt lightheadedness. She noticed once a low BP in the 90's systolic. PCP will be ordering labs this month. She also follows up with Dr. Meng with [facility] endocrinology for her thyroid. She is using her CPAP faithfully. She is experiencing right knee pain from a prior injury but does try to get out and walk. She denies any chest pain.     Medical Problems       Problem List       Dyspnea on exertion    Class 1 obesity due to excess calories with serious comorbidity and body mass index (BMI) of 30.0 to 30.9 in adult    Hematuria    SVT (supraventricular tachycardia) (HCC)    Hypertension    Hyperlipidemia    ABBY (obstructive sleep apnea)    Aortic atherosclerosis (HCC)    Thyroid nodule    Diabetes mellitus (HCC)        Past Medical History:   Diagnosis Date    Diabetes mellitus (HCC)     Hyperlipidemia     Hypertension     ABBY (obstructive sleep apnea)     on CPAP    SVT (supraventricular tachycardia) (HCC)      Social History     Socioeconomic History    Marital status:      Spouse name: Not on file    Number of children: Not on file    Years of education: Not on file    Highest education level: Not on file   Occupational History    Not on file   Tobacco Use    Smoking status: Former     Current packs/day: 0.00     Types: Cigarettes     Start date: 1964     Quit date: 1989     Years since quittin.8    Smokeless tobacco: Never   Vaping Use    Vaping status: Never Used   Substance and Sexual Activity    Alcohol use: Not Currently     Comment: occasionally    Drug use: Never    Sexual activity: Not on file     Comment: defer   Other Topics Concern    Not on file   Social History Narrative    Not on file     Social Determinants of Health     Financial Resource Strain: Not on file   Food Insecurity: No Food Insecurity (3/1/2023)    Hunger Vital Sign     Worried About Running Out of Food in the Last Year: Never true      Ran Out of Food in the Last Year: Never true   Transportation Needs: No Transportation Needs (3/1/2023)    PRAPARE - Transportation     Lack of Transportation (Medical): No     Lack of Transportation (Non-Medical): No   Physical Activity: Not on file   Stress: Not on file   Social Connections: Unknown (2024)    Received from [employer]    Social [employer]     How often do you feel lonely or isolated from those around you? (Adult - for ages 18 years and over): Not on file   Intimate Partner Violence: Not on file   Housing Stability: Low Risk  (3/1/2023)    Housing Stability Vital Sign     Unable to Pay for Housing in the Last Year: No     Number of Places Lived in the Last Year: 1     Unstable Housing in the Last Year: No      History reviewed. No pertinent family history.  Past Surgical History:   Procedure Laterality Date    BREAST SURGERY      CARDIAC ELECTROPHYSIOLOGY PROCEDURE N/A 2023    Procedure: Cardiac eps/svt ablation;  Surgeon: Dallas Echols MD;  Location: [facility];  Service: Cardiology     SECTION      NOSE SURGERY      TONSILLECTOMY      TUBAL LIGATION         Current Outpatient Medications:     alendronate (FOSAMAX) 35 mg tablet, , Disp: , Rfl:     aspirin (ECOTRIN LOW STRENGTH) 81 mg EC tablet, Take 81 mg by mouth daily, Disp: , Rfl:     Cholecalciferol 125 MCG (5000 UT) capsule, Take 1 tablet by mouth daily 1000 units, Disp: , Rfl:     diltiazem (CARDIZEM CD) 240 mg 24 hr capsule, Take 1 capsule (240 mg total) by mouth daily, Disp: 90 capsule, Rfl: 3    losartan (COZAAR) 25 mg tablet, Take 1 tablet (25 mg total) by mouth daily, Disp: , Rfl: 0    Melatonin 5 MG CAPS, Take by mouth daily at bedtime 3 mg, Disp: , Rfl:     metFORMIN (GLUCOPHAGE-XR) 500 mg 24 hr tablet, 2 (two) times a day with meals, Disp: , Rfl:     metoprolol succinate (TOPROL-XL) 25 mg 24 hr tablet, take 1 tablet by mouth twice a day, Disp: 180 tablet, Rfl: 3    Multiple Vitamins-Minerals (CENTRUM  SILVER PO), Take by mouth in the morning, Disp: , Rfl:     rosuvastatin (CRESTOR) 5 mg tablet, Take 5 mg by mouth daily Monday, Wednesday, Friday, Disp: , Rfl:   Allergies   Allergen Reactions    Lisinopril Cough     ace cough  ace cough    Ace-cough    Nsaids Other (See Comments)     Hx of svt    Naproxen Palpitations    Pseudoephedrine Palpitations       Labs:     Chemistry        Component Value Date/Time    K 3.4 (L) 03/08/2023 0550     03/08/2023 0550    CO2 32 03/08/2023 0550    BUN 9 03/08/2023 0550    CREATININE 0.40 (L) 03/08/2023 0550        Component Value Date/Time    CALCIUM 9.3 03/08/2023 0550    ALKPHOS 57 03/01/2023 0513    AST 20 03/01/2023 0513    ALT 15 03/01/2023 0513        Lipid panel 3/1/2023: C 104. T 96. H 42. L 43.  Lipid panel 3/21/2024: C 160. T 108. H 67. L 71.     Cardiac imaging:  ECG 10/14/2024: Normal sinus rhythm with nonspecific ST/T wave abnormality. Rate 78 bpm.     Echo 4/10/2024:  LVEF 50%. Grade 1 DD. Mild LA enlargement.   Mild AI. Mild MR. Mild TR.  Aortic root 3.3 cm.     BLE SOFIA with exercise 11/6/2023:  RIGHT LOWER LIMB PRE EXERCISE  Ankle/Brachial index:  1.20, normal.  Metatarsal pressure of 167 mmHg. Great toe pressure of 115 mmHg, within healing  range.  PVR/ PPG tracings are normal.     LEFT LOWER LIMB PRE EXERCISE:  Ankle/Brachial index: 1.18, normal.  Metatarsal pressure of 102 mmHg. Great toe pressure of 70 mmHg, within healing  range.  PVR/ PPG tracings are normal.     POST EXERCISE ANKLE/BRACHIAL INDEX:  After  3 minutes of toe raises, there is no significant change in the SOFIA.     Abdominal aortic u/s 8/30/2023:  1.  No evidence for abdominal aortic aneurysm.  2.  Atherosclerosis.     ZIO 3/10-3/24/2023:  Predominantly sinus rhythm, HR , avg 79 bpm. 36 runs of SVT, longest 12 beats and fastest 187 bpm, avg 139 bpm. Rare PAC's. Rare PVC's.      ECG 3/10/2023: Sinus tachycardia. 106 bpm. Low voltage QRS. Non specific ST T wave abnormality.     "  Echocardiogram 3/1/2023:   EF 50%. Abnormal septal motion.   Mild diastolic dysfunction.   LAE.   MAC. Trace to mild MR.  Mild TR.   Mild pHTN with estimated RVSP 36 mmHg.   Upper normal aortic root size. 3.6 cm.     Review of Systems   Constitutional: Positive for malaise/fatigue.   HENT: Negative.     Cardiovascular:  Positive for dyspnea on exertion. Negative for chest pain, irregular heartbeat, leg swelling, near-syncope, orthopnea and palpitations.   Respiratory:  Negative for cough and snoring.    Endocrine: Negative.    Skin: Negative.    Musculoskeletal: Negative.    Gastrointestinal: Negative.    Genitourinary: Negative.    Neurological: Negative.    Psychiatric/Behavioral: Negative.         Vitals:    10/14/24 1318   BP: 118/60   Pulse: 72   Temp: (!) 92.4 °F (33.6 °C)   SpO2: 96%     Vitals:    10/14/24 1318   Weight: 81.6 kg (180 lb)     Height: 5' 4\" (162.6 cm)   Body mass index is 30.9 kg/m².    Physical Exam  Vitals and nursing note reviewed.   Constitutional:       General: She is not in acute distress.     Appearance: She is well-developed. She is not diaphoretic.   HENT:      Head: Normocephalic and atraumatic.   Neck:      Thyroid: No thyromegaly.      Vascular: No carotid bruit or JVD.   Cardiovascular:      Rate and Rhythm: Normal rate and regular rhythm. No extrasystoles are present.     Pulses: Intact distal pulses.           Radial pulses are 2+ on the right side and 2+ on the left side.        Dorsalis pedis pulses are 2+ on the right side and 2+ on the left side.      Heart sounds: Normal heart sounds, S1 normal and S2 normal. No murmur heard.     Comments: No edema, brisk cap refill, warm extremities  Pulmonary:      Effort: Pulmonary effort is normal.      Breath sounds: Normal breath sounds.   Abdominal:      General: There is no distension.      Palpations: Abdomen is soft.      Tenderness: There is no abdominal tenderness.   Musculoskeletal:         General: Normal range of motion. "      Cervical back: Normal range of motion and neck supple.      Comments: Utilizing a cane with ambulation   Lymphadenopathy:      Cervical: No cervical adenopathy.   Skin:     General: Skin is warm and dry.   Neurological:      Mental Status: She is alert and oriented to person, place, and time.      Cranial Nerves: No cranial nerve deficit.   Psychiatric:         Mood and Affect: Mood and affect normal.         Behavior: Behavior normal.

## 2024-10-14 NOTE — ASSESSMENT & PLAN NOTE
History of SVT dating back to 2016.   Presented to [facility] 2/28/2023 with increasing frequency of SVT episodes.   Pneumonia noted on CTA chest.   SVT at 216 bpm was noted on presentation with frequent episodes noted (s/p adenosine x 3 in the ER).  WCT noted as well (thought to be SVT with aberrancy).   Transferred to [facility] for EP evaluation and underwent AVNRT and AT ablation 3/6/2023 (Dr. Echols).   Noted to have PAT post ablation.  ECG 3/10/2023 showed sinus tachycardia at 106 bpm and metoprolol tartrate 25 mg BID was started.   Continues Cardizem  mg daily.  14 day ZIO March 2023 showed 36 short runs of SVT, longest 12 beat and avg  bpm.  She followed up with Dr. Echols 4/18/2023 at which time no changes were made to her regimen and she was advised she could follow up PRN.  Due to breakthrough palpitations metoprolol was transitioned to succinate 25 mg BID which is controlling symptoms very well.  Will obtain updated 1 week ZIO to assess rates/rhythm.  For now continue current medications.  Did not tolerate PO magnesium due to GI upset.

## 2024-10-14 NOTE — ASSESSMENT & PLAN NOTE
Echo 4/2024 shows LVEF 50% with no significant change from prior.  No volume overload on exam.  Will obtain updated stress test for ischemic evaluation.  1 week ZIO to monitor HR's

## 2024-10-14 NOTE — PATIENT INSTRUCTIONS
We will get a stress test and updated ZIO monitor.  Report any chest pain immediately.  Check out Tegaderm to put over the ZIO

## 2024-10-14 NOTE — ASSESSMENT & PLAN NOTE
Blood pressure is excellent today.  Continue diltiazem 240 mg daily, losartan 25 mg daily, and metoprolol succinate 25 mg BID.  I requested a copy of her upcoming labs through the PCP  She will monitor for and report if hypotensive with SBP < 110.  Discussed benefits of 2 g sodium diet, regular exercise, weight control.

## 2024-10-31 ENCOUNTER — HOSPITAL ENCOUNTER (OUTPATIENT)
Dept: NUCLEAR MEDICINE | Facility: HOSPITAL | Age: 79
End: 2024-10-31
Payer: COMMERCIAL

## 2024-10-31 ENCOUNTER — HOSPITAL ENCOUNTER (OUTPATIENT)
Dept: NON INVASIVE DIAGNOSTICS | Facility: HOSPITAL | Age: 79
Discharge: HOME | End: 2024-10-31
Payer: COMMERCIAL

## 2024-10-31 ENCOUNTER — CLINICAL SUPPORT (OUTPATIENT)
Dept: CARDIOLOGY CLINIC | Facility: CLINIC | Age: 79
End: 2024-10-31
Payer: COMMERCIAL

## 2024-10-31 DIAGNOSIS — I47.10 SVT (SUPRAVENTRICULAR TACHYCARDIA) (HCC): ICD-10-CM

## 2024-10-31 DIAGNOSIS — R06.09 DYSPNEA ON EXERTION: ICD-10-CM

## 2024-10-31 LAB
CHEST PAIN STATEMENT: NORMAL
MAX DIASTOLIC BP: 66 MMHG
MAX HR: 99 BPM
MAX PREDICTED HEART RATE: 141 BPM
NUC STRESS EJECTION FRACTION: 54 %
PROTOCOL NAME: NORMAL
RATE PRESSURE PRODUCT: 9702
REASON FOR TERMINATION: NORMAL
SL CV REST NUCLEAR ISOTOPE DOSE: 9.6 MCI
SL CV STRESS NUCLEAR ISOTOPE DOSE: 29 MCI
SL CV STRESS RECOVERY BP: NORMAL MMHG
SL CV STRESS RECOVERY HR: 82 BPM
SL CV STRESS RECOVERY O2 SAT: 99 %
STRESS ANGINA INDEX: 0
STRESS BASELINE BP: NORMAL MMHG
STRESS BASELINE HR: 69 BPM
STRESS O2 SAT REST: 96 %
STRESS PEAK HR: 99 BPM
STRESS POST EXERCISE DUR MIN: 3 MIN
STRESS POST EXERCISE DUR MIN: 3 MIN
STRESS POST EXERCISE DUR SEC: 0 SEC
STRESS POST EXERCISE DUR SEC: 0 SEC
STRESS POST O2 SAT PEAK: 100 %
STRESS POST PEAK BP: 98 MMHG
STRESS POST PEAK HR: 99 BPM
STRESS POST PEAK SYSTOLIC BP: 140 MMHG
STRESS/REST PERFUSION RATIO: 0.97
TARGET HR FORMULA: NORMAL
TEST INDICATION: NORMAL

## 2024-10-31 PROCEDURE — 93017 CV STRESS TEST TRACING ONLY: CPT

## 2024-10-31 PROCEDURE — 78452 HT MUSCLE IMAGE SPECT MULT: CPT

## 2024-10-31 PROCEDURE — 93248 EXT ECG>7D<15D REV&INTERPJ: CPT

## 2024-10-31 PROCEDURE — A9502 TC99M TETROFOSMIN: HCPCS

## 2024-10-31 RX ORDER — REGADENOSON 0.08 MG/ML
0.4 INJECTION, SOLUTION INTRAVENOUS ONCE
Status: COMPLETED | OUTPATIENT
Start: 2024-10-31 | End: 2024-10-31

## 2024-10-31 RX ADMIN — REGADENOSON 0.4 MG: 0.08 INJECTION, SOLUTION INTRAVENOUS at 10:23

## 2024-11-11 DIAGNOSIS — I47.10 SVT (SUPRAVENTRICULAR TACHYCARDIA) (HCC): ICD-10-CM

## 2024-11-12 RX ORDER — METOPROLOL SUCCINATE 25 MG/1
25 TABLET, EXTENDED RELEASE ORAL 2 TIMES DAILY
Qty: 180 TABLET | Refills: 1 | Status: SHIPPED | OUTPATIENT
Start: 2024-11-12

## 2025-01-20 NOTE — PROGRESS NOTES
[address]  Progress Note - Lelia Hawkins 1945, 68 y o  female MRN: [id number]  Unit/Bed#: -01 Encounter: [id number]  Primary Care Provider: Tunde Magaña MD   Date and time admitted to hospital: 2/28/2023 10:38 AM    Thyroid nodule  Assessment & Plan  Incidental finding on CT scan 4 2 cm right thyroid nodule with mass effect on the trachea  Recommend follow-up with nonemergent thyroid ultrasound    Class 1 obesity due to excess calories with serious comorbidity and body mass index (BMI) of 32 0 to 32 9 in adult  Assessment & Plan  nutrional information    Sleep apnea  Assessment & Plan  Patient supposed to wear CPAP  Hold due to sputum production   Dyspnea on exertion  Assessment & Plan  Patient feels volume up   Echocardiogram is pending   Patient had a cardiac cath in 2016 which was negative, he also had a stress test in 2016 and 2019, patient SVT history is since 2016     BNP is elevated at 202  Liter of IV fluid bolus and 250 mls albumin   CTA of the chest was negative for PE, pneumonia postive   Most likely mild volume overload with bilateral lower extremity swelling, orthopnea dyspnea on exertion    Pneumonia  Assessment & Plan  · Started on ceftriaxone D2  at Had 2 doses of azithromycin outpatient   · Continue Mucinex  · Strep and legionella negative  · AB are pending   · Blood cultures are pending   · COVID flu and RSV is negative  · Respiratory protocol   · Sputum cultures   · Fever treated with tylenol       SVT (supraventricular tachycardia) ([facility] [address] )  Assessment & Plan  Pt was treated in the ER for SVT with adenosine,  IV and PO metoprolol and Cardizem,no response to Cardizem   · Patient had a rapid response called on her during the day and was transferred to level 2 SD was given metoprolol p o  25 mg and IV Lopressor  · TSH is normal, potassium was 3 8 and magnesium was 2  · After repeated labs at 830 pm -" the patient's bedside for recurrent SVT at 920 PM, PRIMARY PHYSICIAN: Dr. ROC Palmer        PROBLEM LIST:  1. Coronary artery disease   --s/p CABG x 4 on 5/5/2005 by Dr. Herr  --CALDWELL-->D1  --SVG-->LAD  --SVG-->OM  --SVG-->RPDA  2. Essential hypertension  3. Mixed hyperlipidemia        HPI:  Pt returns for follow up visit.  Since last visit she had a fall with a fractured humerus and pelvis.  She was in rehab up until the week before Marcos.  She now resides back in her own home.  She denies chest pain or shortness of breath. She denies lightheadedness, dizziness, or syncope.  She denies paroxysmal nocturnal dyspnea or orthopnea.  She denies palpitations.  She denies rapid weight gain or swelling.        ALLERGIES:   Allergen Reactions    Cephalexin DIZZINESS           MEDICATIONS:  Current Outpatient Medications   Medication Sig    tolterodine (DETROL LA) 4 MG 24 hr capsule Take 1 capsule by mouth daily.    artificial tears Solution Apply 1 drop to eye every 6 hours as needed.    Aspirin 81 MG Cap 2 tablets by mouth daily    lisinopril (ZESTRIL) 40 MG tablet Take 1 tablet by mouth daily.    metoPROLOL tartrate (LOPRESSOR) 50 MG tab 1 tablet by mouth in the morning 1 tablet in the evening.    simvastatin (ZOCOR) 40 MG tablet Take 1 tablet by mouth nightly.    spironolactone (ALDACTONE) 25 MG tablet Take 1 tablet by mouth daily.    Multiple Vitamins-Minerals (PreserVision AREDS) capsule Take 1 capsule by mouth in the morning and 1 capsule in the evening.    Rocklatan 0.02-0.005 % Solution INSTILL 1 DROP INTO LEFT EYE AT BEDTIME         Past Medical History:   Diagnosis Date    Adenocarcinoma of colon  (CMD) 04/06/2021    Cataracts, bilateral 09/2010    Closed fracture of unspecified part of upper end of humerus 10/15/2008    Coronary atherosclerosis of native coronary artery     Diverticulosis 07/28/2021    Mild left sided    Duodenitis without mention of hemorrhage 04/02/2010    EGD/[facility]     Dysphagia, unspecified(787.20) 04/02/2010    EGD/[facility]    Gastroesophageal  patient was placed on the monitor with pads  · See if another 5 mg lopressor, 6 mg of adenosine, no good response-2 mg of IV magnesium 40 mg of IV potassium  · Patient was switched verapamil 5 mg IV and 40 mg PO q 8 hours   · Heart rate is better controlled  · Cardiology consult  · Patient may need EP consult for evaluation, he has been going into SVT frequently since 2016  · Increase verapamil to 80 mg PO q 8 hours      * Sepsis [facility])  Assessment & Plan  Patient meets sepsis criteria-likely pneumonia suspected  Is on ceftriaxone only   CTA PE is negative for PE but has bilateral lower lobe consolidation bronchiolitis versus pneumonia  Procalcitonin ending x1 negative  Patient recently had COVID in January  Respiratory protocol  Emphysematous changes on CT scan     ----------------------------------------------------------------------------------------  HPI/24hr events:   No events overnight   Received one PRN lopressor 5 mg IV x 1     Patient appropriate for transfer out of the ICU today?: No  Disposition: transfer for EP evaluation to [facility]   Code Status: Level 1 - Full Code  ---------------------------------------------------------------------------------------  SUBJECTIVE  No complains     Review of Systems   Constitutional: Positive for fatigue and fever  Respiratory: Positive for cough and shortness of breath  Cardiovascular: Positive for palpitations       Review of systems was reviewed and negative unless stated above in HPI/24-hour events   ---------------------------------------------------------------------------------------  OBJECTIVE    Vitals   Vitals:    23 2132 23 2300 23 0444 23 0534   BP: 130/60 104/57  110/62   BP Location:  Right arm     Pulse:  97  100   Resp:  (!) 48  20   Temp:  97 6 °F (36 4 °C)     TempSrc:  Oral     SpO2:  93%  93%   Weight:   87 9 kg (193 lb 12 6 oz)    Height:         Temp (24hrs), Av 4 °F (36 9 °C), Min:97 6 °F (36 4 °C), Max:99 1 reflux disease     HTN (hypertension)     Hyperlipidemia          Past Surgical History:   Procedure Laterality Date    Appendectomy      Cabg, arterial, four+  2005    OPCABG Dr. Herr @ [facility]  CALDWELL->D1; RSVG Ao->Distal LAD; Ao->OM; Ao->PDA of RCA    Cardiac surgery      Colonoscopy  2021    Dr. Bryant Bx showed Adenocarcinoma     Colonoscopy diagnostic  2021    Dr. Bryant WNL d/c colon    Esophagogastroduodenoscopy transoral flex w/bx single or mult  2010    Dr. Bryant w/bx    Extracapsular cataract removal w insert io lens prosth wo ecp  10/05/2010    Dr. Zhang  OD    Eye surgery      Full rout obste care,vaginal deliv  194    Hemorhoidectomy int ext single column group      Hemorrhoidectomy    Hysterectomy      Left heart cath,percutaneous  2005    Dr. Guerin @ [facility]    Removal of tonsils,12+ y/o      Tonsillectomy Alone    Sigmoidectomy  2021    Open sigmiod resection for colon cancer (Dr. Arreguin)    Total abdom hysterectomy      with Ovaries Intact         Social History    Marital Status:      Spouse Name: Hakan     Number of Children: 1   Occupational History    Retired      [employer]   Social History Main Topics    Tobacco Use: Quit -- 1.0 packs/day for 15 years     Quit date:  1970    Alcohol Use: Yes      rare    Drug Use: Not on file         Family Status   Relation Name Status    Mo   at age 89        Old age    Fa   at age 80        CHF    Sis   at age 66        x2       1--ALS     1--Diabetes    Bro          x3    2--Pneumonia      1--CHF    Son Alec Alive        CABG X 3    Sis  Alive        x2     Unsure    Bro  Alive        CABG X 3    Sis          x4    Bro      Bro      Sis      Sis      Sis     No partnership data on file         REVIEW OF SYSTEMS and per HISTORY OF PRESENT ILLNESS:   Eye Problem(s):negative  ENT Problem(s):negative  Gastro-intestinal  problem(s):negative GI  Genito-urinary problem(s):negative  Musculoskeletal problem(s):negative  Integumentary problem(s):negative  Neurological problem(s):negative  Psychiatric problem(s):negative  Endocrine problem(s):negative  Hematologic and/or Lymphatic problem(s):negative        PHYSICAL EXAMINATION:   CONSTITUTIONAL:    Vitals:    01/20/25 1402 01/20/25 1405   BP: 122/54 122/52   BP Location: RUE - Right upper extremity LUE - Left upper extremity   Patient Position: Sitting Sitting   Cuff Size: Regular Regular   Pulse: 67    SpO2: 97%    Weight: 63.6 kg (140 lb 3.2 oz)          GEN: No apparent distress.  EYES:  There is no conjunctival injection, lesions of the eyelids, or arcus senilis.  ENT: There is no oral pallor or cyanosis.  RESP: There is a normal respiratory effort with clear lungs to auscultation and percussion bilaterally.  CARDIOVASC:  The PMI is not palpable.  The precordium is normoactive.  There is normal rate and regular rhythm with a normal, split S1 and normal S2.  There is a 2/6 holosystolic murmur along the left upper sternal border without radiation. There are no rubs or gallops audible.  The carotid arteries are 2+ bilaterally with no bruits.  There is no jugular venous distention or hepatojugular reflux.  Pedal pulses are 2+ bilaterally.    MUSCULOSKELETAL: The patient has a normal gait capable of exercise treadmill testing.  EXTREMITIES: There is no clubbing or cyanosis of the digits or nails.  There is no pedal edema.  SKIN: Warm, dry, and intact.  NEURO: The patient is alert and oriented to person, place, and time and has a normal mood and affect.        LABS:   12/07/23 08:44   Sodium 145   Potassium 4.0   Chloride 107   CO2 29   ANION GAP 13   Glucose 94   BUN 19   Creatinine 0.86   BUN/CREATININE RATIO 22   Glomerular Filtration Rate 62   CALCIUM 8.6   TOTAL BILIRUBIN 0.8   AST/SGOT 20   ALT/SGPT 31   ALK PHOSPHATASE 82   Albumin 3.8   GLOBULIN 3.4   A/G Ratio, Serum 1.1   TOTAL  °F (37 3 °C)  Current: Temperature: 97 6 °F (36 4 °C)          Respiratory:  SpO2: SpO2: 93 %, SpO2 Activity: SpO2 Activity: At Rest, SpO2 Device: O2 Device: Nasal cannula  Nasal Cannula O2 Flow Rate (L/min): 2 L/min    Invasive/non-invasive ventilation settings   Respiratory    Lab Data (Last 4 hours)    None         O2/Vent Data (Last 4 hours)    None                Physical Exam  Vitals and nursing note reviewed  Constitutional:       General: She is not in acute distress  Appearance: She is well-developed  She is obese  She is not diaphoretic  HENT:      Head: Normocephalic and atraumatic  Mouth/Throat:      Pharynx: No oropharyngeal exudate  Eyes:      Conjunctiva/sclera: Conjunctivae normal       Pupils: Pupils are equal, round, and reactive to light  Neck:      Vascular: No JVD  Trachea: No tracheal deviation  Cardiovascular:      Rate and Rhythm: Regular rhythm  Tachycardia present  Pulses: Normal pulses  Heart sounds: Normal heart sounds  No murmur heard  No friction rub  No gallop  Pulmonary:      Effort: Pulmonary effort is normal  No respiratory distress  Breath sounds: Examination of the right-lower field reveals rhonchi  Examination of the left-lower field reveals rhonchi  Decreased breath sounds and rhonchi present  No wheezing or rales  Chest:      Chest wall: No tenderness  Abdominal:      General: Bowel sounds are normal  There is no distension  Palpations: Abdomen is soft  Tenderness: There is no abdominal tenderness  There is no guarding  Musculoskeletal:         General: No tenderness or deformity  Normal range of motion  Cervical back: Normal range of motion and neck supple  Right lower leg: Edema present  Left lower leg: Edema present  Skin:     General: Skin is warm and dry  Capillary Refill: Capillary refill takes less than 2 seconds  Findings: No erythema     Neurological:      Mental Status: She is alert PROTEIN 7.2   CHOLESTEROL 140   CHOL/HDL 1.9   HDL 72   LDL (Direct) 63   CALCULATED LDL 52   CALCULATED NON HDL 68   TRIGLYCERIDE 81         IMPRESSION/PLAN:  1. CAD s/p CABG x 4 on 5/5/2005.  She remains symptomatically stable.   --continue current medications  --further stress testing to be symptom driven    2. Essential hypertension.  Well controlled   --continue current medications and per primary care physician    3. Mixed hyperlipidemia.  Well controlled.  --lipids due now    Disposition: Follow up in 1 year.     and oriented to person, place, and time  Laboratory and Diagnostics:  Results from last 7 days   Lab Units 03/02/23  0530 03/01/23  0513 02/28/23  1053   WBC Thousand/uL 11 66* 10 46* 13 00*   HEMOGLOBIN g/dL 12 3 12 7 14 3   HEMATOCRIT % 37 0 38 8 43 6   PLATELETS Thousands/uL 333 318 351   NEUTROS PCT %  --  60 63   MONOS PCT %  --  16* 17*     Results from last 7 days   Lab Units 03/01/23  0513 02/28/23 2050 02/28/23  1053   SODIUM mmol/L 136 134* 135   POTASSIUM mmol/L 4 0 3 6 3 8   CHLORIDE mmol/L 101 101 100   CO2 mmol/L 29 27 25   ANION GAP mmol/L 6 6 10   BUN mg/dL 14 20 20   CREATININE mg/dL 0 48* 0 59* 0 61   CALCIUM mg/dL 8 8 9 0 10 0   GLUCOSE RANDOM mg/dL 136 198* 159*   ALT U/L 15  --  14   AST U/L 20  --  24   ALK PHOS U/L 57  --  63   ALBUMIN g/dL 3 8  --  4 1   TOTAL BILIRUBIN mg/dL 0 53  --  0 55     Results from last 7 days   Lab Units 03/01/23  0513 02/28/23 2050 02/28/23  1053   MAGNESIUM mg/dL 2 8* 1 8* 2 0   PHOSPHORUS mg/dL 2 4 2 5 2 7      Results from last 7 days   Lab Units 02/28/23  1053   INR  1 02   PTT seconds 29          Results from last 7 days   Lab Units 02/28/23  1809   LACTIC ACID mmol/L 0 9     ABG:    VBG:    Results from last 7 days   Lab Units 03/01/23  0513 02/28/23  1053   PROCALCITONIN ng/ml 0 13 0 10       Micro  Results from last 7 days   Lab Units 03/01/23  0754 02/28/23  2348 02/28/23  1810 02/28/23  1758   BLOOD CULTURE   --   --  No Growth at 24 hrs  No Growth at 24 hrs  GRAM STAIN RESULT  1+ Epithelial Cells*  2+ Polys*  1+ Gram positive cocci in pairs*  1+ Gram variable rods*  --   --   --    LEGIONELLA URINARY ANTIGEN   --  Negative  --   --    STREP PNEUMONIAE ANTIGEN, URINE   --  Negative  --   --         EKG: ST  Imaging: I have personally reviewed pertinent reports  Intake and Output  I/O       02/28 0701 03/01 0700 03/01 0701 03/02 0700    P  O  360 720    I V  (mL/kg) 90 (1)     IV Piggyback 1351 7 150    Total Intake(mL/kg) 1801 7 (20 2) 870 (9 7)    Urine (mL/kg/hr) 500 1900 (0 9)    Total Output 500 1900    Net +1301 7 -1030          Unmeasured Urine Occurrence 1 x           Height and Weights   Height: 5' 4" (162 6 cm)  IBW (Ideal Body Weight): 54 7 kg  Body mass index is 33 26 kg/m²  Weight (last 2 days)     Date/Time Weight    03/02/23 0444 87 9 (193 78)    03/01/23 0700 89 4 (197)    03/01/23 0453 89 7 (197 75)    02/28/23 1457 86 2 (190)    02/28/23 1045 85 7 (189)            Nutrition       Diet Orders   (From admission, onward)             Start     Ordered    03/01/23 1153  Diet Clear Liquid  Diet effective now        References:    Nutrtion Support Algorithm Enteral vs  Parenteral   Question Answer Comment   Diet Type Clear Liquid    RD to adjust diet per protocol?  Yes        03/01/23 1152                  Active Medications  Scheduled Meds:  Current Facility-Administered Medications   Medication Dose Route Frequency Provider Last Rate   • acetaminophen  650 mg Oral Q6H PRN Tammy Mcmahon MD     • aspirin  81 mg Oral Daily Tammy Mcmahon MD     • atorvastatin  40 mg Oral Daily With Carroll Good MD     • benzonatate  100 mg Oral TID PRN Reynold Comer PA-C     • cefTRIAXone  1,000 mg Intravenous Q24H Tammy Mcmahon MD Stopped (03/01/23 1732)   • docusate sodium  100 mg Oral BID Tammy Mcmahon MD     • enoxaparin  40 mg Subcutaneous Daily Tammy Mcmahon MD     • guaiFENesin  600 mg Oral U37E Cindy Olivarez MD     • insulin lispro  1-6 Units Subcutaneous TID AC Tammy Mcmahon MD     • insulin lispro  1-6 Units Subcutaneous HS Tammy Mcmahon MD     • levalbuterol  1 25 mg Nebulization Q4H PRN Tammy Mcmahon MD     • metoprolol  5 mg Intravenous Q6H PRN VIVI Ferrer     • ondansetron  4 mg Intravenous Q6H PRN Abner Lomeli MD     • phenylephine   mcg/min Intravenous Titrated Reynold Comer PA-C     • verapamil  80 mg Oral Q8H [facility] Reynold Comer PA-C Continuous Infusions:  phenylephine,  mcg/min      PRN Meds:   acetaminophen, 650 mg, Q6H PRN  benzonatate, 100 mg, TID PRN  levalbuterol, 1 25 mg, Q4H PRN  metoprolol, 5 mg, Q6H PRN  ondansetron, 4 mg, Q6H PRN        Invasive Devices Review  Invasive Devices     Peripheral Intravenous Line  Duration           Peripheral IV 02/28/23 Dorsal (posterior); Right Hand 1 day    Peripheral IV 02/28/23 Left Antecubital 1 day    Peripheral IV 02/28/23 Right Wrist 1 day          Drain  Duration           External Urinary Catheter 1 day                Rationale for remaining devices:   ---------------------------------------------------------------------------------------  Advance Directive and Living Will:      Power of :    POLST:    ---------------------------------------------------------------------------------------  Care Time Delivered:   No Critical Care time spent       [address], [place]-      Portions of the record may have been created with voice recognition software  Occasional wrong word or "sound a like" substitutions may have occurred due to the inherent limitations of voice recognition software    Read the chart carefully and recognize, using context, where substitutions have occurred

## 2025-02-10 DIAGNOSIS — I47.10 SVT (SUPRAVENTRICULAR TACHYCARDIA) (HCC): ICD-10-CM

## 2025-02-11 RX ORDER — METOPROLOL SUCCINATE 25 MG/1
25 TABLET, EXTENDED RELEASE ORAL 2 TIMES DAILY
Qty: 180 TABLET | Refills: 1 | Status: SHIPPED | OUTPATIENT
Start: 2025-02-11

## 2025-03-10 ENCOUNTER — TELEPHONE (OUTPATIENT)
Age: 80
End: 2025-03-10

## 2025-03-10 NOTE — TELEPHONE ENCOUNTER
"Pt under care of:  [facility]ico  Office Location: [facility]    Last Seen (include Follow Up recommendations of last visit- see Office Visit - Instructions):  6/15/2023 \"She will follow-up in 10 years if the microhematuria persist. She will follow-up sooner if she develops urinary symptoms or gross hematuria.\"    Pt calling due to: Be seen for gross hematuria    Active Symptoms?  Explain: darker urine, with bubbles     Pt can be reached at: [phone]    Appointment Details  Date:  3/13  Time:    2:20 PM  Location:     Provider:  Porter    Does the appointment need further review? (Reason)    "

## 2025-03-10 NOTE — TELEPHONE ENCOUNTER
Patient called in asking what signs would signal her to go to the ED. Reviewed ED precautions with patient. No further assistance needed.

## 2025-03-11 NOTE — ASSESSMENT & PLAN NOTE
Prior work up 10 years ago negative- microscopic  hematuria  Additional microscopic hematuria Work up June 2023 negative, Atypical cytology  Former smoker, quit 1989  Now with gross hematuria x 2 episodes, states recent UA negative for infection, no results  POCT 2+ blood today   F/u office cystoscopy, CT renal protocol prior      Orders:    CT renal protocol; Future    Basic metabolic panel; Future    Cytology, urine; Future    POCT urine dip auto non-scope    Urine culture; Future

## 2025-03-11 NOTE — PROGRESS NOTES
Name: Raven Valdovinos      : 1945      MRN: [id number]  Encounter Provider: VIVI Shepherd  Encounter Date: 3/13/2025   Encounter department: [facility] [place]  :  Assessment & Plan  Gross hematuria  Prior work up 10 years ago negative- microscopic  hematuria  Additional microscopic hematuria Work up 2023 negative, Atypical cytology  Former smoker, quit   Now with gross hematuria x 2 episodes, states recent UA negative for infection, no results  POCT 2+ blood today   F/u office cystoscopy, CT renal protocol prior      Orders:    CT renal protocol; Future    Basic metabolic panel; Future    Cytology, urine; Future    POCT urine dip auto non-scope    Urine culture; Future    Discussed her recurrence of hematuria in setting of smoking history and previously negative Microscopic hematuria work up.  We did discuss the possible meaning of bleeding within the genitourinary tract such as infection, urolithiasis, trauma, intrinsic medical renal disease, recent procedure/surgery or malignancy involving the kidneys, ureters, bladder.  Discussed risk factors for urothelial carcinoma including current and past tobacco use. We then reviewed the complete work up of hematuria including cystoscopy and CT urogram with a urinalysis, urine culture, and possibility of urine cytology.      She understands prior history of smoking does put someone at increased risk for malignancy.  She will proceed with office cystoscopy, urine culture due to lack of results, urine cytology due to prior evidence of atypia.  Will also proceed with  Ct renal protocol and bmp blood testing and F/U for office cystoscopy.  She does use metformin, she will consult her PCP regarding his recommendations on holding this x 48 hours.  All questions were answered, she understands and agrees with the plan.          History of Present Illness   Raven Valdovinos is a 79 y.o. female who presents gross hematuria.  She is a  former smoker, quit 1989.  Prior workup for same June, 2023.  Yielding negative workup, atypical cytology, CT 2021 with contrast --negative.  Voiding well, no frequency, or dysuria, fever, chills or flank pain.  Feels as if she empties well, some urgency when delaying void.  She does report gross hematuria for two occasions last week no further episodes at this time. She states she had a recent urine culture that did rule out infection, no results located states she is retired and very active in nature.  Overall feeling well.            Review of Systems   Constitutional: Negative.  Negative for chills and fever.   HENT: Negative.     Eyes: Negative.    Respiratory: Negative.  Negative for chest tightness and shortness of breath.    Cardiovascular: Negative.    Gastrointestinal: Negative.  Negative for abdominal distention, constipation, diarrhea, nausea and vomiting.   Endocrine: Negative.    Genitourinary:  Positive for hematuria and urgency. Negative for difficulty urinating, dysuria, flank pain and frequency.   Musculoskeletal:  Negative for back pain and neck pain.   Skin: Negative.    Neurological: Negative.  Negative for dizziness and headaches.   Psychiatric/Behavioral: Negative.  Negative for agitation and behavioral problems.           Objective   There were no vitals taken for this visit.    Physical Exam  Vitals reviewed.   Constitutional:       General: She is not in acute distress.  HENT:      Head: Normocephalic and atraumatic.      Right Ear: External ear normal.      Left Ear: External ear normal.      Nose: Nose normal.   Eyes:      Extraocular Movements: Extraocular movements intact.      Pupils: Pupils are equal, round, and reactive to light.   Cardiovascular:      Rate and Rhythm: Normal rate.   Pulmonary:      Effort: Pulmonary effort is normal. No respiratory distress.   Abdominal:      Palpations: Abdomen is soft.      Tenderness: There is no abdominal tenderness. There is no right CVA  "tenderness, left CVA tenderness or guarding.   Musculoskeletal:         General: Normal range of motion.      Cervical back: Normal range of motion and neck supple.   Skin:     General: Skin is warm and dry.   Neurological:      General: No focal deficit present.      Mental Status: She is alert and oriented to person, place, and time. Mental status is at baseline.   Psychiatric:         Mood and Affect: Mood normal.         Behavior: Behavior normal.         Thought Content: Thought content normal.         Judgment: Judgment normal.          Results   No results found for: \"PSA\"  Lab Results   Component Value Date    CALCIUM 9.3 03/08/2023    K 3.4 (L) 03/08/2023    CO2 32 03/08/2023     03/08/2023    BUN 9 03/08/2023    CREATININE 0.40 (L) 03/08/2023     Lab Results   Component Value Date    WBC 8.56 03/08/2023    HGB 11.5 03/08/2023    HCT 34.5 (L) 03/08/2023    MCV 99 (H) 03/08/2023     03/08/2023       Office Urine Dip  No results found for this or any previous visit (from the past hour).      "

## 2025-03-13 ENCOUNTER — OFFICE VISIT (OUTPATIENT)
Dept: UROLOGY | Facility: CLINIC | Age: 80
End: 2025-03-13
Payer: COMMERCIAL

## 2025-03-13 ENCOUNTER — TELEPHONE (OUTPATIENT)
Dept: UROLOGY | Facility: CLINIC | Age: 80
End: 2025-03-13

## 2025-03-13 VITALS
SYSTOLIC BLOOD PRESSURE: 122 MMHG | WEIGHT: 186.8 LBS | TEMPERATURE: 97.3 F | HEART RATE: 72 BPM | BODY MASS INDEX: 31.89 KG/M2 | DIASTOLIC BLOOD PRESSURE: 60 MMHG | OXYGEN SATURATION: 96 % | HEIGHT: 64 IN

## 2025-03-13 DIAGNOSIS — R31.0 GROSS HEMATURIA: Primary | ICD-10-CM

## 2025-03-13 DIAGNOSIS — R31.9 HEMATURIA, UNSPECIFIED TYPE: ICD-10-CM

## 2025-03-13 LAB
SL AMB  POCT GLUCOSE, UA: NORMAL
SL AMB LEUKOCYTE ESTERASE,UA: NORMAL
SL AMB POCT BILIRUBIN,UA: NORMAL
SL AMB POCT BLOOD,UA: NORMAL
SL AMB POCT CLARITY,UA: CLEAR
SL AMB POCT COLOR,UA: YELLOW
SL AMB POCT KETONES,UA: NORMAL
SL AMB POCT NITRITE,UA: NORMAL
SL AMB POCT PH,UA: 5.5
SL AMB POCT SPECIFIC GRAVITY,UA: 1.01
SL AMB POCT URINE PROTEIN: NORMAL
SL AMB POCT UROBILINOGEN: 0.2

## 2025-03-13 PROCEDURE — 81003 URINALYSIS AUTO W/O SCOPE: CPT

## 2025-03-13 PROCEDURE — 99214 OFFICE O/P EST MOD 30 MIN: CPT

## 2025-03-13 PROCEDURE — 88112 CYTOPATH CELL ENHANCE TECH: CPT | Performed by: PATHOLOGY

## 2025-03-13 PROCEDURE — 87086 URINE CULTURE/COLONY COUNT: CPT

## 2025-03-13 NOTE — PATIENT INSTRUCTIONS
Schedule CT renal protocol today prior to leaving  Call PCP for advice on metformin with CT dye  Follow up office cystoscopy  Can stop for BMP blood test today upon leaving to check kidney function

## 2025-03-14 LAB — BACTERIA UR CULT: NORMAL

## 2025-03-14 NOTE — TELEPHONE ENCOUNTER
Patient called stating her car broke down on her way home from her appointment yesterday. She is going to try to find a ride to take her to the [facility] tomorrow but if not she will have her BMP completed Monday or Tuesday. I advised to fast 8 hours prior. Patient verbalized understanding.

## 2025-03-17 ENCOUNTER — RESULTS FOLLOW-UP (OUTPATIENT)
Dept: UROLOGY | Facility: CLINIC | Age: 80
End: 2025-03-17

## 2025-03-17 PROCEDURE — 88112 CYTOPATH CELL ENHANCE TECH: CPT | Performed by: PATHOLOGY

## 2025-03-18 ENCOUNTER — APPOINTMENT (OUTPATIENT)
Dept: LAB | Facility: HOSPITAL | Age: 80
End: 2025-03-18
Payer: COMMERCIAL

## 2025-03-18 DIAGNOSIS — R31.9 HEMATURIA, UNSPECIFIED TYPE: ICD-10-CM

## 2025-03-18 DIAGNOSIS — R31.0 GROSS HEMATURIA: ICD-10-CM

## 2025-03-18 LAB
ANION GAP SERPL CALCULATED.3IONS-SCNC: 8 MMOL/L (ref 4–13)
BUN SERPL-MCNC: 17 MG/DL (ref 5–25)
CALCIUM SERPL-MCNC: 9.7 MG/DL (ref 8.4–10.2)
CHLORIDE SERPL-SCNC: 101 MMOL/L (ref 96–108)
CO2 SERPL-SCNC: 30 MMOL/L (ref 21–32)
CREAT SERPL-MCNC: 0.53 MG/DL (ref 0.6–1.3)
GFR SERPL CREATININE-BSD FRML MDRD: 90 ML/MIN/1.73SQ M
GLUCOSE P FAST SERPL-MCNC: 117 MG/DL (ref 65–99)
POTASSIUM SERPL-SCNC: 3.9 MMOL/L (ref 3.5–5.3)
SODIUM SERPL-SCNC: 139 MMOL/L (ref 135–147)

## 2025-03-18 PROCEDURE — 36415 COLL VENOUS BLD VENIPUNCTURE: CPT

## 2025-03-18 PROCEDURE — 88112 CYTOPATH CELL ENHANCE TECH: CPT | Performed by: PATHOLOGY

## 2025-03-18 PROCEDURE — 80048 BASIC METABOLIC PNL TOTAL CA: CPT

## 2025-03-18 NOTE — TELEPHONE ENCOUNTER
----- Message from VIVI Shepherd sent at 3/18/2025  2:01 PM EDT -----  Creatinine wnl, fasting glucose elevated, please encourage patient to follow this up with  PCP

## 2025-03-19 PROCEDURE — 88112 CYTOPATH CELL ENHANCE TECH: CPT | Performed by: PATHOLOGY

## 2025-03-20 NOTE — TELEPHONE ENCOUNTER
Patient called in stating she had a hard time understanding nurse the other day who informed her of results. Reviewed AP's note. Patient is asking about urine cytology results. Please advise.

## 2025-03-25 NOTE — TELEPHONE ENCOUNTER
Patient returning missed call from Johanne, I provided her the information per Saskia. Patient verbalized understanding.

## 2025-04-11 ENCOUNTER — HOSPITAL ENCOUNTER (OUTPATIENT)
Dept: CT IMAGING | Facility: HOSPITAL | Age: 80
End: 2025-04-11
Payer: COMMERCIAL

## 2025-04-11 DIAGNOSIS — R31.0 GROSS HEMATURIA: ICD-10-CM

## 2025-04-11 PROCEDURE — 74178 CT ABD&PLV WO CNTR FLWD CNTR: CPT

## 2025-04-11 RX ADMIN — IOHEXOL 75 ML: 350 INJECTION, SOLUTION INTRAVENOUS at 09:41

## 2025-04-15 ENCOUNTER — OFFICE VISIT (OUTPATIENT)
Dept: CARDIOLOGY CLINIC | Facility: CLINIC | Age: 80
End: 2025-04-15
Payer: COMMERCIAL

## 2025-04-15 VITALS
HEART RATE: 78 BPM | WEIGHT: 187 LBS | BODY MASS INDEX: 31.92 KG/M2 | DIASTOLIC BLOOD PRESSURE: 68 MMHG | SYSTOLIC BLOOD PRESSURE: 120 MMHG | TEMPERATURE: 97.8 F | OXYGEN SATURATION: 98 % | HEIGHT: 64 IN

## 2025-04-15 DIAGNOSIS — I70.0 AORTIC ATHEROSCLEROSIS (HCC): ICD-10-CM

## 2025-04-15 DIAGNOSIS — G47.33 OSA (OBSTRUCTIVE SLEEP APNEA): ICD-10-CM

## 2025-04-15 DIAGNOSIS — I10 PRIMARY HYPERTENSION: ICD-10-CM

## 2025-04-15 DIAGNOSIS — E78.2 MIXED HYPERLIPIDEMIA: ICD-10-CM

## 2025-04-15 DIAGNOSIS — I47.10 SVT (SUPRAVENTRICULAR TACHYCARDIA) (HCC): Primary | ICD-10-CM

## 2025-04-15 PROCEDURE — 99214 OFFICE O/P EST MOD 30 MIN: CPT | Performed by: NURSE PRACTITIONER

## 2025-04-15 RX ORDER — DILTIAZEM HYDROCHLORIDE 240 MG/1
240 CAPSULE, COATED, EXTENDED RELEASE ORAL DAILY
Qty: 90 CAPSULE | Refills: 3 | Status: SHIPPED | OUTPATIENT
Start: 2025-04-15

## 2025-04-15 RX ORDER — METOPROLOL SUCCINATE 25 MG/1
25 TABLET, EXTENDED RELEASE ORAL 2 TIMES DAILY
Qty: 180 TABLET | Refills: 3 | Status: SHIPPED | OUTPATIENT
Start: 2025-04-15

## 2025-04-15 NOTE — ASSESSMENT & PLAN NOTE
Lipid panel 3/21/2024: C 160. T 108. H 67. L 71.  Rosuvastatin 5 mg daily.   Goal LDL < 70.   Continue current regimen.  Requested labs from PCP office

## 2025-04-15 NOTE — PROGRESS NOTES
Cardiology Follow Up    Raven Valdovinos  1945  [phone]  [facility] [place] [address]  [address] FLO[address]  [facility] [address] [phone]    Anastasia presents for follow up of SVT, HTN, HLD, and aortic atherosclerosis on vascular imaging.      1. SVT (supraventricular tachycardia) (HCC)  Assessment & Plan:  History of SVT dating back to 2016.   Presented to [facility] 2/28/2023 with increasing frequency of SVT episodes.   Pneumonia noted on CTA chest.   SVT at 216 bpm was noted on presentation with frequent episodes noted (s/p adenosine x 3 in the ER).  WCT noted as well (thought to be SVT with aberrancy).   Transferred to [facility] for EP evaluation and underwent AVNRT and AT ablation 3/6/2023 (Dr. Echols).   Noted to have PAT post ablation.  ECG 3/10/2023 showed sinus tachycardia at 106 bpm and metoprolol tartrate 25 mg BID was started.   Continues Cardizem  mg daily.  14 day ZIO March 2023 showed 36 short runs of SVT, longest 12 beat and avg  bpm.  She followed up with Dr. Echols 4/18/2023 at which time no changes were made to her regimen and she was advised she could follow up PRN.  Due to breakthrough palpitations metoprolol was transitioned to succinate 25 mg BID which is controlling symptoms very well.  ZIO 10/2024 shows only 1 5-beat SVT.  Did not tolerate PO magnesium due to GI upset.  Orders:  -     diltiazem (CARDIZEM CD) 240 mg 24 hr capsule; Take 1 capsule (240 mg total) by mouth daily  -     metoprolol succinate (TOPROL-XL) 25 mg 24 hr tablet; Take 1 tablet (25 mg total) by mouth 2 (two) times a day  2. Aortic atherosclerosis (HCC)  Assessment & Plan:  Aortic atherosclerosis noted on xray and ultrasound imaging.  Bilateral SOFIA with exercise 11/6/2023 shows normal findings.  Continue aspirin 81 mg daily and rosuvastatin 5 mg daily for goal LDL < 70.  We reviewed s/s to report. Currently without symptoms of claudication  3. Primary  hypertension  Assessment & Plan:  Blood pressure is excellent today.  Continue diltiazem 240 mg daily, losartan 25 mg daily, and metoprolol succinate 25 mg BID.  I requested a copy of her upcoming labs through the PCP  She will monitor for and report if hypotensive with SBP < 110.  Discussed benefits of 2 g sodium diet, regular exercise, weight control.  4. Mixed hyperlipidemia  Assessment & Plan:  Lipid panel 3/21/2024: C 160. T 108. H 67. L 71.  Rosuvastatin 5 mg daily.   Goal LDL < 70.   Continue current regimen.  Requested labs from PCP office    5. ABBY (obstructive sleep apnea)  Assessment & Plan:  Continue use of CPAP. Reviewed importance of faithful use today.     ZEN Oconnor has a past medical history of SVT, diabetes, hypertension, hyperlipidemia and ABBY on CPAP.      She was previously following with Dr. Mcintosh at [facility]. She transitioned her care to [facility] in February 2023. Her history of SVT dates back to 2016 for which she was placed on metoprolol tartrate 25 mg BID. She underwent cardiac catheterization at that time which showed normal coronaries.      She was admitted to [facility] ER 2/28/2023 with increasing frequency of SVT/rapid heart rates. ECG on presentation showed SVT at 216 bpm. She was given adenosine x 3 in the ER for recurrent SVT. CT of chest with likely bilateral pneumonia. She was noted to have a WCT on presentation to the floor and transferred to the ICU. She was transferred to [facility] for EP evaluation. She ultimately underwent AVNRT and atrial tachycardia ablation (near tricuspid valve) on 3/6/2023 and was discharged home 3/8/2023 on diltiazem  mg daily. 2 week ZIO monitor was recommended. She was given furosemide during admission and discharged home on furosemide 40 mg x 3 days.      She followed up with Dr. Quezada on 3/10/2023. She continued to note elevated heart rates and ECG at the visit showed sinus tachycardia with PACs at 106 bpm. She reported  weakness. Metoprolol tartrate 25 mg BID was started and a 14 day ZIO monitor applied. ZIO showed 36 short runs of SVT, longest 12 beats with avg  bpm.     She followed up with Dr. Echols with [facility] EP on 4/18/2023 at which time he reviewed her ZIO monitor and no further adjustments were made to her regimen. She was advised she could follow up with EP PRN.      She reached out to our office on 7/12/2023 to report that her PCP had obtained a back xray which reported atherosclerosis on her aorta. An abdominal aortic ultrasound was ordered 8/30/2023 and showed atherosclerosis with no aneurysm. She was instructed to start daily 81 mg aspirin. She was already on statin therapy with LDL at 43. Given complaint of leg pain with activity bilateral LE SOFIA testing was ordered.      She followed up most recently 10/24/2023 at which time her LEAD testing was scheduled for January 2024. She noticed breakthrough palpitations around the time she was due for her second dose of metoprolol in the afternoon. She was transitioned from metoprolol tartrate to metoprolol succinate 25 mg twice daily.      Bilateral lower extremity SOFIA's with exercise 11/6/2023 showed no evidence of PAD.     She followed up most recently 10/13/2024. ECG showed SR at 78 bpm. She completed an updated echo on 4/10/2024 which showed LVEF 50% with no significant change from prior. She reported dyspnea on exertion. A nuclear stress test and updated ZIO were ordered.    4/15/2025: Anastasia presents for routine follow up. She is doing very well. Nuclear stress test 10/31/2024 showed no obvious ischemia with preserved EF. 14 day ZIO showed an avg HR of 76 bpm with only one run of SVT lasting 5 beats. She denies chest pain, shortness of breath, orthopnea, or edema. No palpitations. She has been using her CPAP faithfully and notes improvement in her fatigue. Dyspnea on exertion is stable.     Medical Problems       Problem List       Dyspnea on exertion     Class 1 obesity due to excess calories with serious comorbidity and body mass index (BMI) of 30.0 to 30.9 in adult    Hematuria    SVT (supraventricular tachycardia) (HCC)    Hypertension    Hyperlipidemia    ABBY (obstructive sleep apnea)    Aortic atherosclerosis (HCC)    Thyroid nodule    Diabetes mellitus (HCC)        Past Medical History:   Diagnosis Date    Diabetes mellitus (HCC)     Hyperlipidemia     Hypertension     ABBY (obstructive sleep apnea)     on CPAP    SVT (supraventricular tachycardia) (HCC)      Social History     Socioeconomic History    Marital status:      Spouse name: Not on file    Number of children: Not on file    Years of education: Not on file    Highest education level: Not on file   Occupational History    Not on file   Tobacco Use    Smoking status: Former     Current packs/day: 0.00     Types: Cigarettes     Start date: 1964     Quit date: 1989     Years since quittin.3    Smokeless tobacco: Never   Vaping Use    Vaping status: Never Used   Substance and Sexual Activity    Alcohol use: Not Currently     Comment: occasionally    Drug use: Never    Sexual activity: Not on file     Comment: defer   Other Topics Concern    Not on file   Social History Narrative    Not on file     Social Drivers of Health     Financial Resource Strain: Not on file   Food Insecurity: No Food Insecurity (3/1/2023)    Hunger Vital Sign     Worried About Running Out of Food in the Last Year: Never true     Ran Out of Food in the Last Year: Never true   Transportation Needs: No Transportation Needs (3/1/2023)    PRAPARE - Transportation     Lack of Transportation (Medical): No     Lack of Transportation (Non-Medical): No   Physical Activity: Not on file   Stress: Not on file   Social Connections: Unknown (2024)    Received from [facility]    Social Connections     How often do you feel lonely or isolated from those around you? (Adult - for ages 18 years and over): Not on file   Intimate  Partner Violence: Not on file   Housing Stability: Low Risk  (3/1/2023)    Housing Stability Vital Sign     Unable to Pay for Housing in the Last Year: No     Number of Places Lived in the Last Year: 1     Unstable Housing in the Last Year: No      History reviewed. No pertinent family history.  Past Surgical History:   Procedure Laterality Date    BREAST SURGERY      CARDIAC ELECTROPHYSIOLOGY PROCEDURE N/A 2023    Procedure: Cardiac eps/svt ablation;  Surgeon: Dallas Echols MD;  Location: [facility];  Service: Cardiology     SECTION      KNEE SURGERY Right     fracture right knee    NOSE SURGERY      TONSILLECTOMY      TUBAL LIGATION         Current Outpatient Medications:     alendronate (FOSAMAX) 35 mg tablet, , Disp: , Rfl:     aspirin (ECOTRIN LOW STRENGTH) 81 mg EC tablet, Take 81 mg by mouth daily, Disp: , Rfl:     Cholecalciferol 125 MCG (5000 UT) capsule, Take 1 tablet by mouth daily 1000 units, Disp: , Rfl:     diltiazem (CARDIZEM CD) 240 mg 24 hr capsule, Take 1 capsule (240 mg total) by mouth daily, Disp: 90 capsule, Rfl: 3    losartan (COZAAR) 25 mg tablet, Take 1 tablet (25 mg total) by mouth daily, Disp: , Rfl: 0    Melatonin 5 MG CAPS, Take by mouth daily at bedtime 3 mg, Disp: , Rfl:     metFORMIN (GLUCOPHAGE-XR) 500 mg 24 hr tablet, 2 (two) times a day with meals, Disp: , Rfl:     metoprolol succinate (TOPROL-XL) 25 mg 24 hr tablet, Take 1 tablet (25 mg total) by mouth 2 (two) times a day, Disp: 180 tablet, Rfl: 3    Multiple Vitamins-Minerals (CENTRUM SILVER PO), Take by mouth in the morning, Disp: , Rfl:     rosuvastatin (CRESTOR) 5 mg tablet, Take 5 mg by mouth daily Monday, Wednesday, Friday, Disp: , Rfl:   Allergies   Allergen Reactions    Lisinopril Cough     ace cough  ace cough    Ace-cough    Nsaids Other (See Comments)     Hx of svt    Naproxen Palpitations    Pseudoephedrine Palpitations       Labs:     Chemistry        Component Value Date/Time    K 3.9  03/18/2025 0748     03/18/2025 0748    CO2 30 03/18/2025 0748    BUN 17 03/18/2025 0748    CREATININE 0.53 (L) 03/18/2025 0748        Component Value Date/Time    CALCIUM 9.7 03/18/2025 0748    ALKPHOS 57 03/01/2023 0513    AST 20 03/01/2023 0513    ALT 15 03/01/2023 0513        Lipid panel 3/1/2023: C 104. T 96. H 42. L 43.  Lipid panel 3/21/2024: C 160. T 108. H 67. L 71.     Cardiac imaging:    Lexiscan nuclear stress test 10/31/2024:    Stress ECG: No ST deviation is noted. There were no arrhythmias during recovery. . The ECG was not diagnostic due to pharmacological (vasodilator) stress.    Perfusion: The defect appears to be an artifact caused by subdiaphragmatic activity and soft tissue.    Stress Function: Left ventricular function post-stress is normal. Stress ejection fraction is 54%.    Stress Combined Conclusion: There is image artifact, without diagnostic evidence for perfusion abnormality.    ZIO 10/18-10/25/2024:  Patient had a min HR of 53 bpm, max HR of 112 bpm, and avg HR of 76 bpm. Predominant underlying rhythm was Sinus Rhythm. 1 run of Supraventricular Tachycardia occurred lasting 5 beats with a max rate of 101 bpm (avg 92 bpm). Isolated SVEs were occasional (1.1%, 8440), SVE Couplets were rare (<1.0%, 99), and SVE Triplets were rare (<1.0%, 7). Isolated VEs were rare (<1.0%), VE Couplets were rare (<1.0%), and no VE Triplets were present. Ventricular Trigeminy was present.     ECG 10/14/2024: Normal sinus rhythm with nonspecific ST/T wave abnormality. Rate 78 bpm.      Echo 4/10/2024:  LVEF 50%. Grade 1 DD. Mild LA enlargement.   Mild AI. Mild MR. Mild TR.  Aortic root 3.3 cm.      BLE SOFIA with exercise 11/6/2023:  RIGHT LOWER LIMB PRE EXERCISE  Ankle/Brachial index:  1.20, normal.  Metatarsal pressure of 167 mmHg. Great toe pressure of 115 mmHg, within healing  range.  PVR/ PPG tracings are normal.     LEFT LOWER LIMB PRE EXERCISE:  Ankle/Brachial index: 1.18, normal.  Metatarsal  "pressure of 102 mmHg. Great toe pressure of 70 mmHg, within healing  range.  PVR/ PPG tracings are normal.     POST EXERCISE ANKLE/BRACHIAL INDEX:  After  3 minutes of toe raises, there is no significant change in the SOFIA.     Abdominal aortic u/s 8/30/2023:  1.  No evidence for abdominal aortic aneurysm.  2.  Atherosclerosis.     ZIO 3/10-3/24/2023:  Predominantly sinus rhythm, HR , avg 79 bpm. 36 runs of SVT, longest 12 beats and fastest 187 bpm, avg 139 bpm. Rare PAC's. Rare PVC's.      ECG 3/10/2023: Sinus tachycardia. 106 bpm. Low voltage QRS. Non specific ST T wave abnormality.      Echocardiogram 3/1/2023:   EF 50%. Abnormal septal motion.   Mild diastolic dysfunction.   LAE.   MAC. Trace to mild MR.  Mild TR.   Mild pHTN with estimated RVSP 36 mmHg.   Upper normal aortic root size. 3.6 cm.     Review of Systems   Constitutional: Negative.   HENT: Negative.     Cardiovascular:  Positive for dyspnea on exertion. Negative for chest pain, irregular heartbeat, near-syncope, orthopnea and palpitations.   Respiratory:  Negative for cough and snoring.    Endocrine: Negative.    Skin: Negative.    Musculoskeletal: Negative.    Gastrointestinal: Negative.    Genitourinary: Negative.    Neurological: Negative.    Psychiatric/Behavioral: Negative.         Vitals:    04/15/25 0800   BP: 120/68   Pulse: 78   Temp: 97.8 °F (36.6 °C)   SpO2: 98%     Vitals:    04/15/25 0800   Weight: 84.8 kg (187 lb)     Height: 5' 4\" (162.6 cm)   Body mass index is 32.1 kg/m².    Physical Exam  Vitals and nursing note reviewed.   Constitutional:       General: She is not in acute distress.     Appearance: She is well-developed. She is not diaphoretic.   HENT:      Head: Normocephalic and atraumatic.   Neck:      Thyroid: No thyromegaly.      Vascular: No carotid bruit or JVD.   Cardiovascular:      Rate and Rhythm: Normal rate and regular rhythm.      Pulses: Intact distal pulses.           Radial pulses are 2+ on the right side and " 2+ on the left side.      Heart sounds: Normal heart sounds, S1 normal and S2 normal. No murmur heard.  Pulmonary:      Effort: Pulmonary effort is normal.      Breath sounds: Normal breath sounds.   Abdominal:      General: There is no distension.      Palpations: Abdomen is soft.      Tenderness: There is no abdominal tenderness.   Musculoskeletal:         General: Normal range of motion.      Cervical back: Normal range of motion and neck supple.   Lymphadenopathy:      Cervical: No cervical adenopathy.   Skin:     General: Skin is warm and dry.   Neurological:      Mental Status: She is alert and oriented to person, place, and time.      Cranial Nerves: No cranial nerve deficit.   Psychiatric:         Behavior: Behavior normal.

## 2025-04-15 NOTE — ASSESSMENT & PLAN NOTE
Aortic atherosclerosis noted on xray and ultrasound imaging.  Bilateral SOFIA with exercise 11/6/2023 shows normal findings.  Continue aspirin 81 mg daily and rosuvastatin 5 mg daily for goal LDL < 70.  We reviewed s/s to report. Currently without symptoms of claudication

## 2025-04-15 NOTE — PATIENT INSTRUCTIONS
Your stress test showed normal heart function with no blockages.  ZIO shows only one 5-beat SVT.  Continue current medications and CPAP  I will get labs from primary care

## 2025-04-15 NOTE — ASSESSMENT & PLAN NOTE
History of SVT dating back to 2016.   Presented to [facility] 2/28/2023 with increasing frequency of SVT episodes.   Pneumonia noted on CTA chest.   SVT at 216 bpm was noted on presentation with frequent episodes noted (s/p adenosine x 3 in the ER).  WCT noted as well (thought to be SVT with aberrancy).   Transferred [facility] for EP evaluation and underwent AVNRT and AT ablation 3/6/2023 (Dr. Echols).   Noted to have PAT post ablation.  ECG 3/10/2023 showed sinus tachycardia at 106 bpm and metoprolol tartrate 25 mg BID was started.   Continues Cardizem  mg daily.  14 day ZIO March 2023 showed 36 short runs of SVT, longest 12 beat and avg  bpm.  She followed up with Dr. Echols 4/18/2023 at which time no changes were made to her regimen and she was advised she could follow up PRN.  Due to breakthrough palpitations metoprolol was transitioned to succinate 25 mg BID which is controlling symptoms very well.  ZIO 10/2024 shows only 1 5-beat SVT.  Did not tolerate PO magnesium due to GI upset.

## 2025-05-02 ENCOUNTER — RESULTS FOLLOW-UP (OUTPATIENT)
Dept: OTHER | Facility: HOSPITAL | Age: 80
End: 2025-05-02

## 2025-05-02 ENCOUNTER — HOSPITAL ENCOUNTER (OUTPATIENT)
Dept: MRI IMAGING | Facility: HOSPITAL | Age: 80
End: 2025-05-02
Payer: COMMERCIAL

## 2025-05-02 ENCOUNTER — TELEPHONE (OUTPATIENT)
Age: 80
End: 2025-05-02

## 2025-05-02 DIAGNOSIS — N28.9 RENAL LESION: Primary | ICD-10-CM

## 2025-05-02 DIAGNOSIS — N28.9 RENAL LESION: ICD-10-CM

## 2025-05-02 PROCEDURE — 74183 MRI ABD W/O CNTR FLWD CNTR: CPT

## 2025-05-02 PROCEDURE — A9585 GADOBUTROL INJECTION: HCPCS

## 2025-05-02 RX ORDER — GADOBUTROL 604.72 MG/ML
9 INJECTION INTRAVENOUS
Status: COMPLETED | OUTPATIENT
Start: 2025-05-02 | End: 2025-05-02

## 2025-05-02 RX ADMIN — GADOBUTROL 9 ML: 604.72 INJECTION INTRAVENOUS at 08:20

## 2025-05-02 NOTE — TELEPHONE ENCOUNTER
Pt aware of message from Saskia Buenrostro N.P. She will keep her appt in June with Dr. D'Amico. Nothing else needed at this time.

## 2025-05-02 NOTE — TELEPHONE ENCOUNTER
Patient called for MRI results done today.    CB#[phone]    MRI - ABDOMEN - WITH AND WITHOUT CONTRAST         IMPRESSION:     1. Left lateral interpolar renal T2 hypointensity and mild T1 hyperintensity lesion with a possible thin enhancing septation which corresponds with the lesion seen on recent CT. The appearance is typical of a hemorrhagic cyst. However, because of the   questionable focal internal enhancement, follow-up MRI in 6 months is recommended.     2. 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  -Management recommendation: Follow-up every 2 years. Endpoint determined by clinician. This has been stable since 4/8/2021. Preferred imaging modality: abdomen MRI and MRCP with and without IV contrast, or triple phase abdomen CT with IV contrast, or   abdomen MRI and MRCP without IV contrast.     Management and follow up recommendations for cystic pancreatic lesions are based on Institutional consensus and international evidence-based Kyoto guidelines for the management of intraductal papillary mucinous neoplasm of the pancreas. Pancreatology 24   (2024) 255-270.     The study was marked in EPIC for significant notification.     Resident: EUNICE ZHU I, the attending radiologist, have reviewed the images and agree with the final report above.     Workstation performed: [id number]

## 2025-05-05 NOTE — TELEPHONE ENCOUNTER
Patient called in today requesting the phone number to . Patient is also requesting a cb from Saskia or a nurse for a question she has regarding the MRI taken on 5/2/25. Please review.     Cb: [phone]

## 2025-05-05 NOTE — TELEPHONE ENCOUNTER
Spoke with patient and she wanted to know if there was anything she needed to do regarding the cyst on her kidney beside the f/u MRI in 6 months. Made her aware to keep her appointment with Dr. D'amico to discuss. She verbalized understanding and also made her aware to contact her PCP regarding the cyst on her pancrease. She verbalized understanding of this as well. All questions were answered.

## 2025-06-16 ENCOUNTER — PROCEDURE VISIT (OUTPATIENT)
Dept: UROLOGY | Facility: CLINIC | Age: 80
End: 2025-06-16
Payer: COMMERCIAL

## 2025-06-16 VITALS
HEIGHT: 64 IN | HEART RATE: 66 BPM | SYSTOLIC BLOOD PRESSURE: 118 MMHG | OXYGEN SATURATION: 97 % | BODY MASS INDEX: 31.96 KG/M2 | WEIGHT: 187.2 LBS | DIASTOLIC BLOOD PRESSURE: 64 MMHG | TEMPERATURE: 97.6 F

## 2025-06-16 DIAGNOSIS — R31.9 HEMATURIA, UNSPECIFIED TYPE: Primary | ICD-10-CM

## 2025-06-16 LAB
SL AMB  POCT GLUCOSE, UA: NORMAL
SL AMB LEUKOCYTE ESTERASE,UA: NORMAL
SL AMB POCT BILIRUBIN,UA: NORMAL
SL AMB POCT BLOOD,UA: NORMAL
SL AMB POCT CLARITY,UA: CLEAR
SL AMB POCT COLOR,UA: YELLOW
SL AMB POCT KETONES,UA: NORMAL
SL AMB POCT NITRITE,UA: NORMAL
SL AMB POCT PH,UA: 5.5
SL AMB POCT SPECIFIC GRAVITY,UA: 1.02
SL AMB POCT URINE PROTEIN: NORMAL
SL AMB POCT UROBILINOGEN: 0.2

## 2025-06-16 PROCEDURE — 52000 CYSTOURETHROSCOPY: CPT | Performed by: UROLOGY

## 2025-06-16 PROCEDURE — 81003 URINALYSIS AUTO W/O SCOPE: CPT | Performed by: UROLOGY

## 2025-06-16 RX ORDER — CEPHALEXIN 500 MG/1
500 CAPSULE ORAL ONCE
Status: COMPLETED | OUTPATIENT
Start: 2025-06-16 | End: 2025-06-16

## 2025-06-16 RX ADMIN — CEPHALEXIN 500 MG: 500 CAPSULE ORAL at 10:35

## 2025-06-16 NOTE — PROGRESS NOTES
"   Cystoscopy     Date/Time  6/16/2025 10:00 AM     Performed by  Frank D'Amico, MD   Authorized by  Frank D'Amico, MD       Universal Protocol:  procedure performed by consultantConsent: Verbal consent obtained. Written consent obtained  Risks and benefits: risks, benefits and alternatives were discussed  Consent given by: patient  Time out: Immediately prior to procedure a \"time out\" was called to verify the correct patient, procedure, equipment, support staff and site/side marked as required.  Timeout called at: 6/16/2025 10:33 AM.  Patient identity confirmed: verbally with patient      Procedure Details:  Procedure type: cystoscopy    Patient tolerance: Patient tolerated the procedure well with no immediate complications    Additional Procedure Details: Patient underwent flexible digital cystoscopy.  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  No tumors in the bladder.  No erythema the bladder.  No stones in the bladder.  Normal orifices.  The scope was retroflexed.  1 dose of Keflex.  Patient was prepped with Betadine and lidocaine jelly.  She has a sebaceous cyst of her vulva which is not bothersome and nontender.  Left side.  7-8 mm in size.  Will see her in 6 months once her repeat MRIs been completed.  We discussed her MRI findings.  Nothing too worrisome but will need close follow-up.  2 small stones discussed in her left kidney as well.      "

## 2025-06-17 ENCOUNTER — TELEPHONE (OUTPATIENT)
Age: 80
End: 2025-06-17

## 2025-06-17 NOTE — TELEPHONE ENCOUNTER
Pt calling and requesting that OV notes from cysto with Dr D'Amico be sent over to PCP office.     Pt PCP is [facility] in [place].     Fax #- [phone]

## (undated) DEVICE — Device: Brand: SMARTABLATE

## (undated) DEVICE — PINNACLE INTRODUCER SHEATH: Brand: PINNACLE

## (undated) DEVICE — CATH EP 7FR DI-DIR 10-POLE DEFL CS 2-8-2 FJ

## (undated) DEVICE — Device: Brand: REFERENCE PATCH CARTO 3

## (undated) DEVICE — Device: Brand: THERMOCOOL SMARTTOUCH SF

## (undated) DEVICE — GUIDE SHEATH AGILIS TRANSSEPTAL 8.5FR 71CM MED CURL

## (undated) DEVICE — CATH EP 5FR QUAD SUPREME JSN

## (undated) DEVICE — Device: Brand: PENTARAY NAV